# Patient Record
Sex: FEMALE | Race: WHITE | NOT HISPANIC OR LATINO | Employment: OTHER | URBAN - METROPOLITAN AREA
[De-identification: names, ages, dates, MRNs, and addresses within clinical notes are randomized per-mention and may not be internally consistent; named-entity substitution may affect disease eponyms.]

---

## 2023-03-08 DIAGNOSIS — E78.2 MIXED HYPERLIPIDEMIA: Primary | ICD-10-CM

## 2023-03-16 RX ORDER — SIMVASTATIN 20 MG/1
TABLET, FILM COATED ORAL
Qty: 90 TABLET | Refills: 0 | Status: SHIPPED | OUTPATIENT
Start: 2023-03-16 | End: 2023-05-16 | Stop reason: SDUPTHER

## 2023-05-04 ENCOUNTER — TELEPHONE (OUTPATIENT)
Dept: PRIMARY CARE | Facility: CLINIC | Age: 73
End: 2023-05-04
Payer: MEDICARE

## 2023-05-04 NOTE — TELEPHONE ENCOUNTER
Says her MCV # is off and asking if it is ok for her to take Ibuprofen  Also wants you to know she takes hydroxyrea from oncologist and is going to continue that

## 2023-05-15 ENCOUNTER — HOSPITAL ENCOUNTER (OUTPATIENT)
Dept: DATA CONVERSION | Facility: HOSPITAL | Age: 73
End: 2023-05-15
Attending: INTERNAL MEDICINE | Admitting: INTERNAL MEDICINE
Payer: MEDICARE

## 2023-05-15 DIAGNOSIS — D45 POLYCYTHEMIA VERA (MULTI): ICD-10-CM

## 2023-05-15 DIAGNOSIS — Z00.00 ENCOUNTER FOR GENERAL ADULT MEDICAL EXAMINATION WITHOUT ABNORMAL FINDINGS: ICD-10-CM

## 2023-05-15 DIAGNOSIS — E78.5 HYPERLIPIDEMIA, UNSPECIFIED: ICD-10-CM

## 2023-05-15 DIAGNOSIS — E03.9 HYPOTHYROIDISM, UNSPECIFIED: ICD-10-CM

## 2023-05-15 DIAGNOSIS — D12.3 BENIGN NEOPLASM OF TRANSVERSE COLON: ICD-10-CM

## 2023-05-15 DIAGNOSIS — I10 ESSENTIAL (PRIMARY) HYPERTENSION: ICD-10-CM

## 2023-05-15 DIAGNOSIS — Z79.82 LONG TERM (CURRENT) USE OF ASPIRIN: ICD-10-CM

## 2023-05-15 DIAGNOSIS — K63.5 POLYP OF COLON: ICD-10-CM

## 2023-05-15 DIAGNOSIS — K57.30 DIVERTICULOSIS OF LARGE INTESTINE WITHOUT PERFORATION OR ABSCESS WITHOUT BLEEDING: ICD-10-CM

## 2023-05-15 DIAGNOSIS — D12.0 BENIGN NEOPLASM OF CECUM: ICD-10-CM

## 2023-05-15 DIAGNOSIS — Z12.11 ENCOUNTER FOR SCREENING FOR MALIGNANT NEOPLASM OF COLON: ICD-10-CM

## 2023-05-15 DIAGNOSIS — Z88.2 ALLERGY STATUS TO SULFONAMIDES: ICD-10-CM

## 2023-05-15 DIAGNOSIS — D12.8 BENIGN NEOPLASM OF RECTUM: ICD-10-CM

## 2023-05-15 DIAGNOSIS — Z86.010 PERSONAL HISTORY OF COLONIC POLYPS: ICD-10-CM

## 2023-05-15 DIAGNOSIS — K64.1 SECOND DEGREE HEMORRHOIDS: ICD-10-CM

## 2023-05-15 DIAGNOSIS — D12.2 BENIGN NEOPLASM OF ASCENDING COLON: ICD-10-CM

## 2023-05-16 ENCOUNTER — OFFICE VISIT (OUTPATIENT)
Dept: PRIMARY CARE | Facility: CLINIC | Age: 73
End: 2023-05-16
Payer: MEDICARE

## 2023-05-16 VITALS
DIASTOLIC BLOOD PRESSURE: 78 MMHG | WEIGHT: 256 LBS | HEIGHT: 69 IN | BODY MASS INDEX: 37.92 KG/M2 | SYSTOLIC BLOOD PRESSURE: 142 MMHG

## 2023-05-16 DIAGNOSIS — I10 PRIMARY HYPERTENSION: ICD-10-CM

## 2023-05-16 DIAGNOSIS — E78.2 MIXED HYPERLIPIDEMIA: ICD-10-CM

## 2023-05-16 DIAGNOSIS — N32.81 OAB (OVERACTIVE BLADDER): ICD-10-CM

## 2023-05-16 DIAGNOSIS — E03.9 HYPOTHYROIDISM, UNSPECIFIED TYPE: ICD-10-CM

## 2023-05-16 DIAGNOSIS — D45 POLYCYTHEMIA VERA (MULTI): ICD-10-CM

## 2023-05-16 DIAGNOSIS — L98.9 SKIN LESION: ICD-10-CM

## 2023-05-16 DIAGNOSIS — E66.01 CLASS 2 SEVERE OBESITY WITH BODY MASS INDEX (BMI) OF 35 TO 39.9 WITH SERIOUS COMORBIDITY (MULTI): Primary | ICD-10-CM

## 2023-05-16 PROBLEM — E66.812 CLASS 2 SEVERE OBESITY WITH BODY MASS INDEX (BMI) OF 35 TO 39.9 WITH SERIOUS COMORBIDITY: Status: ACTIVE | Noted: 2023-05-16

## 2023-05-16 PROCEDURE — 3077F SYST BP >= 140 MM HG: CPT | Performed by: STUDENT IN AN ORGANIZED HEALTH CARE EDUCATION/TRAINING PROGRAM

## 2023-05-16 PROCEDURE — 99213 OFFICE O/P EST LOW 20 MIN: CPT | Performed by: STUDENT IN AN ORGANIZED HEALTH CARE EDUCATION/TRAINING PROGRAM

## 2023-05-16 PROCEDURE — 1036F TOBACCO NON-USER: CPT | Performed by: STUDENT IN AN ORGANIZED HEALTH CARE EDUCATION/TRAINING PROGRAM

## 2023-05-16 PROCEDURE — 3078F DIAST BP <80 MM HG: CPT | Performed by: STUDENT IN AN ORGANIZED HEALTH CARE EDUCATION/TRAINING PROGRAM

## 2023-05-16 RX ORDER — AMLODIPINE BESYLATE 5 MG/1
5 TABLET ORAL DAILY
Qty: 90 TABLET | Refills: 1 | Status: SHIPPED | OUTPATIENT
Start: 2023-05-16 | End: 2023-09-12 | Stop reason: ALTCHOICE

## 2023-05-16 RX ORDER — ESTRADIOL 0.1 MG/G
CREAM VAGINAL DAILY
COMMUNITY
Start: 2022-03-08

## 2023-05-16 RX ORDER — METOPROLOL TARTRATE 25 MG/1
25 TABLET, FILM COATED ORAL 2 TIMES DAILY
COMMUNITY
Start: 2019-10-10 | End: 2023-05-16 | Stop reason: ALTCHOICE

## 2023-05-16 RX ORDER — OXYBUTYNIN CHLORIDE 10 MG/1
10 TABLET, EXTENDED RELEASE ORAL DAILY
COMMUNITY
Start: 2022-03-08 | End: 2023-05-16 | Stop reason: SDUPTHER

## 2023-05-16 RX ORDER — METOPROLOL SUCCINATE 50 MG/1
50 TABLET, EXTENDED RELEASE ORAL DAILY
Qty: 90 TABLET | Refills: 3 | Status: SHIPPED | OUTPATIENT
Start: 2023-05-16 | End: 2024-04-23 | Stop reason: SDUPTHER

## 2023-05-16 RX ORDER — HYDROXYUREA 500 MG/1
500 CAPSULE ORAL DAILY
COMMUNITY
Start: 2020-06-19 | End: 2023-11-06 | Stop reason: SDUPTHER

## 2023-05-16 RX ORDER — ASPIRIN 81 MG/1
81 TABLET ORAL DAILY
COMMUNITY
Start: 2019-11-12 | End: 2023-05-16 | Stop reason: ALTCHOICE

## 2023-05-16 RX ORDER — LEVOTHYROXINE SODIUM 175 UG/1
175 TABLET ORAL DAILY
COMMUNITY
Start: 2014-02-07 | End: 2023-05-16 | Stop reason: SDUPTHER

## 2023-05-16 RX ORDER — OXYBUTYNIN CHLORIDE 10 MG/1
10 TABLET, EXTENDED RELEASE ORAL DAILY
Qty: 90 TABLET | Refills: 1 | Status: SHIPPED | OUTPATIENT
Start: 2023-05-16 | End: 2023-09-12 | Stop reason: SDUPTHER

## 2023-05-16 RX ORDER — AMLODIPINE BESYLATE 5 MG/1
5 TABLET ORAL DAILY
COMMUNITY
Start: 2017-01-24 | End: 2023-05-16 | Stop reason: SDUPTHER

## 2023-05-16 RX ORDER — SIMVASTATIN 20 MG/1
20 TABLET, FILM COATED ORAL DAILY
Qty: 90 TABLET | Refills: 1 | Status: SHIPPED | OUTPATIENT
Start: 2023-05-16 | End: 2023-09-12 | Stop reason: SDUPTHER

## 2023-05-16 RX ORDER — METOPROLOL TARTRATE 25 MG/1
25 TABLET, FILM COATED ORAL 2 TIMES DAILY
Qty: 180 TABLET | Refills: 1 | Status: CANCELLED | OUTPATIENT
Start: 2023-05-16

## 2023-05-16 RX ORDER — LEVOTHYROXINE SODIUM 175 UG/1
175 TABLET ORAL DAILY
Qty: 90 TABLET | Refills: 1 | Status: SHIPPED | OUTPATIENT
Start: 2023-05-16 | End: 2023-09-12 | Stop reason: SDUPTHER

## 2023-05-16 NOTE — PROGRESS NOTES
"Subjective   Patient ID: Ileana Glover is a 73 y.o. female who presents for Follow-up (Med refill).    HPI   Routine fu.  Med refill.  She has not been monitoring BP at home.  She is hoping to have hip replacement later this year.  Review of Systems  12-point ROS reviewed and was negative unless otherwise noted in HPI.    Objective   /78   Ht 1.753 m (5' 9\")   Wt 116 kg (256 lb)   BMI 37.80 kg/m²     Physical Exam  GEN: conversant, NAD  HEENT: PERRL, EOMI, wearing a mask  NECK: supple, no carotid bruits appreciated b/l  CV: S1, S2, RRR  PULM: CTAB  ABD: soft, NT, ND  NEURO: no new gross focal deficits  EXT: no sig LE edema  PSYCH: appropriate affect    Assessment/Plan     #Hypothyroidism: Maintained on levothyroxine to 175mcg daily    #Polycythemia vera/thrombocytosis: Seeing hematology. Continue hydroxyurea, ASA 81 mg daily.    #Hypertension: above goal. Increase metoprolol succ to 50mg daily Continue amlodipine.    #Hyperlipidemia: Continue statin.     #Obesity, severe, class 2: Continue lifestyle modifications, swimming, weight watchers.    #Knee/shoulder OA: following with Juan Nelson and Ashleigh. Had shoulder surgery with Dr. Sotelo in 7/2022.    #Skin lesions: dermatology referral    #Health maintenance:   Follows with Opthalmology, Dentist regularly.  Follows with gynecology, who orders mammograms regularly.   DEXA performed 2022  Tetanus vaccine in 2013.   Pneumovax 2015. Prevnar 20 recommended  Colonoscopy performed 5/2023, 3 year fu advised. Received Shingrix and COVID-19 vaccine. UTD on flu shot    RTC 4 mo, labs at that time     "

## 2023-05-22 LAB
COMPLETE PATHOLOGY REPORT: NORMAL
CONVERTED CLINICAL DIAGNOSIS-HISTORY: NORMAL
CONVERTED FINAL DIAGNOSIS: NORMAL
CONVERTED FINAL REPORT PDF LINK TO COPY AND PASTE: NORMAL
CONVERTED GROSS DESCRIPTION: NORMAL

## 2023-05-26 LAB
BASOPHILS (10*3/UL) IN BLOOD BY AUTOMATED COUNT: 0.01 X10E9/L (ref 0–0.1)
BASOPHILS/100 LEUKOCYTES IN BLOOD BY AUTOMATED COUNT: 0.1 % (ref 0–2)
ERYTHROCYTE DISTRIBUTION WIDTH (RATIO) BY AUTOMATED COUNT: 15 % (ref 11.5–14.5)
ERYTHROCYTE MEAN CORPUSCULAR HEMOGLOBIN CONCENTRATION (G/DL) BY AUTOMATED: 34.5 G/DL (ref 32–36)
ERYTHROCYTE MEAN CORPUSCULAR VOLUME (FL) BY AUTOMATED COUNT: 98 FL (ref 80–100)
ERYTHROCYTES (10*6/UL) IN BLOOD BY AUTOMATED COUNT: 4.51 X10E12/L (ref 4–5.2)
HEMATOCRIT (%) IN BLOOD BY AUTOMATED COUNT: 44.3 % (ref 36–46)
HEMOGLOBIN (G/DL) IN BLOOD: 15.3 G/DL (ref 12–16)
IMMATURE GRANULOCYTES/100 LEUKOCYTES IN BLOOD BY AUTOMATED COUNT: 0.3 % (ref 0–0.9)
LEUKOCYTES (10*3/UL) IN BLOOD BY AUTOMATED COUNT: 15.9 X10E9/L (ref 4.4–11.3)
LYMPHOCYTES (10*3/UL) IN BLOOD BY AUTOMATED COUNT: 1.24 X10E9/L (ref 0.8–3)
LYMPHOCYTES/100 LEUKOCYTES IN BLOOD BY AUTOMATED COUNT: 7.8 % (ref 13–44)
MONOCYTES (10*3/UL) IN BLOOD BY AUTOMATED COUNT: 0.61 X10E9/L (ref 0.05–0.8)
MONOCYTES/100 LEUKOCYTES IN BLOOD BY AUTOMATED COUNT: 3.8 % (ref 2–10)
NEUTROPHILS (10*3/UL) IN BLOOD BY AUTOMATED COUNT: 13.97 X10E9/L (ref 1.6–5.5)
NEUTROPHILS/100 LEUKOCYTES IN BLOOD BY AUTOMATED COUNT: 88 % (ref 40–80)
PLATELETS (10*3/UL) IN BLOOD AUTOMATED COUNT: 333 X10E9/L (ref 150–450)

## 2023-05-30 ENCOUNTER — TELEPHONE (OUTPATIENT)
Dept: PRIMARY CARE | Facility: CLINIC | Age: 73
End: 2023-05-30

## 2023-08-21 LAB
ALANINE AMINOTRANSFERASE (SGPT) (U/L) IN SER/PLAS: 14 U/L (ref 7–45)
ALBUMIN (G/DL) IN SER/PLAS: 3.5 G/DL (ref 3.4–5)
ALKALINE PHOSPHATASE (U/L) IN SER/PLAS: 74 U/L (ref 33–136)
ANION GAP IN SER/PLAS: 13 MMOL/L (ref 10–20)
ASPARTATE AMINOTRANSFERASE (SGOT) (U/L) IN SER/PLAS: 16 U/L (ref 9–39)
BASOPHILS (10*3/UL) IN BLOOD BY AUTOMATED COUNT: 0.04 X10E9/L (ref 0–0.1)
BASOPHILS/100 LEUKOCYTES IN BLOOD BY AUTOMATED COUNT: 0.7 % (ref 0–2)
BILIRUBIN TOTAL (MG/DL) IN SER/PLAS: 0.6 MG/DL (ref 0–1.2)
CALCIUM (MG/DL) IN SER/PLAS: 10 MG/DL (ref 8.6–10.6)
CARBON DIOXIDE, TOTAL (MMOL/L) IN SER/PLAS: 29 MMOL/L (ref 21–32)
CHLORIDE (MMOL/L) IN SER/PLAS: 104 MMOL/L (ref 98–107)
CREATININE (MG/DL) IN SER/PLAS: 0.87 MG/DL (ref 0.5–1.05)
EOSINOPHILS (10*3/UL) IN BLOOD BY AUTOMATED COUNT: 0.09 X10E9/L (ref 0–0.4)
EOSINOPHILS/100 LEUKOCYTES IN BLOOD BY AUTOMATED COUNT: 1.5 % (ref 0–6)
ERYTHROCYTE DISTRIBUTION WIDTH (RATIO) BY AUTOMATED COUNT: 13.7 % (ref 11.5–14.5)
ERYTHROCYTE MEAN CORPUSCULAR HEMOGLOBIN CONCENTRATION (G/DL) BY AUTOMATED: 33.3 G/DL (ref 32–36)
ERYTHROCYTE MEAN CORPUSCULAR VOLUME (FL) BY AUTOMATED COUNT: 103 FL (ref 80–100)
ERYTHROCYTES (10*6/UL) IN BLOOD BY AUTOMATED COUNT: 4.4 X10E12/L (ref 4–5.2)
GFR FEMALE: 70 ML/MIN/1.73M2
GLUCOSE (MG/DL) IN SER/PLAS: 73 MG/DL (ref 74–99)
HEMATOCRIT (%) IN BLOOD BY AUTOMATED COUNT: 45.3 % (ref 36–46)
HEMOGLOBIN (G/DL) IN BLOOD: 15.1 G/DL (ref 12–16)
IMMATURE GRANULOCYTES/100 LEUKOCYTES IN BLOOD BY AUTOMATED COUNT: 0.5 % (ref 0–0.9)
LEUKOCYTES (10*3/UL) IN BLOOD BY AUTOMATED COUNT: 5.9 X10E9/L (ref 4.4–11.3)
LYMPHOCYTES (10*3/UL) IN BLOOD BY AUTOMATED COUNT: 1.85 X10E9/L (ref 0.8–3)
LYMPHOCYTES/100 LEUKOCYTES IN BLOOD BY AUTOMATED COUNT: 31.2 % (ref 13–44)
MONOCYTES (10*3/UL) IN BLOOD BY AUTOMATED COUNT: 0.39 X10E9/L (ref 0.05–0.8)
MONOCYTES/100 LEUKOCYTES IN BLOOD BY AUTOMATED COUNT: 6.6 % (ref 2–10)
NEUTROPHILS (10*3/UL) IN BLOOD BY AUTOMATED COUNT: 3.53 X10E9/L (ref 1.6–5.5)
NEUTROPHILS/100 LEUKOCYTES IN BLOOD BY AUTOMATED COUNT: 59.5 % (ref 40–80)
NRBC (PER 100 WBCS) BY AUTOMATED COUNT: 0 /100 WBC (ref 0–0)
PLATELETS (10*3/UL) IN BLOOD AUTOMATED COUNT: 307 X10E9/L (ref 150–450)
POTASSIUM (MMOL/L) IN SER/PLAS: 4.8 MMOL/L (ref 3.5–5.3)
PROTEIN TOTAL: 6.3 G/DL (ref 6.4–8.2)
SODIUM (MMOL/L) IN SER/PLAS: 141 MMOL/L (ref 136–145)
UREA NITROGEN (MG/DL) IN SER/PLAS: 19 MG/DL (ref 6–23)

## 2023-09-12 ENCOUNTER — OFFICE VISIT (OUTPATIENT)
Dept: PRIMARY CARE | Facility: CLINIC | Age: 73
End: 2023-09-12
Payer: MEDICARE

## 2023-09-12 ENCOUNTER — LAB (OUTPATIENT)
Dept: LAB | Facility: LAB | Age: 73
End: 2023-09-12
Payer: MEDICARE

## 2023-09-12 VITALS
BODY MASS INDEX: 37.33 KG/M2 | DIASTOLIC BLOOD PRESSURE: 82 MMHG | SYSTOLIC BLOOD PRESSURE: 150 MMHG | WEIGHT: 252 LBS | HEIGHT: 69 IN

## 2023-09-12 DIAGNOSIS — D45 POLYCYTHEMIA VERA (MULTI): ICD-10-CM

## 2023-09-12 DIAGNOSIS — N32.81 OAB (OVERACTIVE BLADDER): ICD-10-CM

## 2023-09-12 DIAGNOSIS — E78.2 MIXED HYPERLIPIDEMIA: ICD-10-CM

## 2023-09-12 DIAGNOSIS — M16.9 OSTEOARTHRITIS OF HIP, UNSPECIFIED LATERALITY, UNSPECIFIED OSTEOARTHRITIS TYPE: ICD-10-CM

## 2023-09-12 DIAGNOSIS — Z00.00 HEALTH CARE MAINTENANCE: ICD-10-CM

## 2023-09-12 DIAGNOSIS — E03.9 HYPOTHYROIDISM, UNSPECIFIED TYPE: ICD-10-CM

## 2023-09-12 DIAGNOSIS — Z00.00 HEALTHCARE MAINTENANCE: ICD-10-CM

## 2023-09-12 DIAGNOSIS — Z00.00 HEALTHCARE MAINTENANCE: Primary | ICD-10-CM

## 2023-09-12 DIAGNOSIS — I10 PRIMARY HYPERTENSION: ICD-10-CM

## 2023-09-12 LAB
ALANINE AMINOTRANSFERASE (SGPT) (U/L) IN SER/PLAS: 15 U/L (ref 7–45)
ALBUMIN (G/DL) IN SER/PLAS: 4.3 G/DL (ref 3.4–5)
ALKALINE PHOSPHATASE (U/L) IN SER/PLAS: 73 U/L (ref 33–136)
ANION GAP IN SER/PLAS: 12 MMOL/L (ref 10–20)
ASPARTATE AMINOTRANSFERASE (SGOT) (U/L) IN SER/PLAS: 19 U/L (ref 9–39)
BILIRUBIN TOTAL (MG/DL) IN SER/PLAS: 0.7 MG/DL (ref 0–1.2)
CALCIUM (MG/DL) IN SER/PLAS: 10.2 MG/DL (ref 8.6–10.6)
CARBON DIOXIDE, TOTAL (MMOL/L) IN SER/PLAS: 28 MMOL/L (ref 21–32)
CHLORIDE (MMOL/L) IN SER/PLAS: 104 MMOL/L (ref 98–107)
CHOLESTEROL (MG/DL) IN SER/PLAS: 183 MG/DL (ref 0–199)
CHOLESTEROL IN HDL (MG/DL) IN SER/PLAS: 61.6 MG/DL
CHOLESTEROL/HDL RATIO: 3
CREATININE (MG/DL) IN SER/PLAS: 0.83 MG/DL (ref 0.5–1.05)
ERYTHROCYTE DISTRIBUTION WIDTH (RATIO) BY AUTOMATED COUNT: 13.9 % (ref 11.5–14.5)
ERYTHROCYTE MEAN CORPUSCULAR HEMOGLOBIN CONCENTRATION (G/DL) BY AUTOMATED: 32.9 G/DL (ref 32–36)
ERYTHROCYTE MEAN CORPUSCULAR VOLUME (FL) BY AUTOMATED COUNT: 104 FL (ref 80–100)
ERYTHROCYTES (10*6/UL) IN BLOOD BY AUTOMATED COUNT: 4.49 X10E12/L (ref 4–5.2)
GFR FEMALE: 74 ML/MIN/1.73M2
GLUCOSE (MG/DL) IN SER/PLAS: 84 MG/DL (ref 74–99)
HEMATOCRIT (%) IN BLOOD BY AUTOMATED COUNT: 46.5 % (ref 36–46)
HEMOGLOBIN (G/DL) IN BLOOD: 15.3 G/DL (ref 12–16)
LDL: 103 MG/DL (ref 0–99)
LEUKOCYTES (10*3/UL) IN BLOOD BY AUTOMATED COUNT: 6.6 X10E9/L (ref 4.4–11.3)
NRBC (PER 100 WBCS) BY AUTOMATED COUNT: 0 /100 WBC (ref 0–0)
PLATELETS (10*3/UL) IN BLOOD AUTOMATED COUNT: 292 X10E9/L (ref 150–450)
POTASSIUM (MMOL/L) IN SER/PLAS: 5 MMOL/L (ref 3.5–5.3)
PROTEIN TOTAL: 6.8 G/DL (ref 6.4–8.2)
SODIUM (MMOL/L) IN SER/PLAS: 139 MMOL/L (ref 136–145)
THYROTROPIN (MIU/L) IN SER/PLAS BY DETECTION LIMIT <= 0.05 MIU/L: 1.45 MIU/L (ref 0.44–3.98)
TRIGLYCERIDE (MG/DL) IN SER/PLAS: 93 MG/DL (ref 0–149)
UREA NITROGEN (MG/DL) IN SER/PLAS: 15 MG/DL (ref 6–23)
VLDL: 19 MG/DL (ref 0–40)

## 2023-09-12 PROCEDURE — 80053 COMPREHEN METABOLIC PANEL: CPT

## 2023-09-12 PROCEDURE — 1125F AMNT PAIN NOTED PAIN PRSNT: CPT | Performed by: STUDENT IN AN ORGANIZED HEALTH CARE EDUCATION/TRAINING PROGRAM

## 2023-09-12 PROCEDURE — 80061 LIPID PANEL: CPT

## 2023-09-12 PROCEDURE — 84443 ASSAY THYROID STIM HORMONE: CPT

## 2023-09-12 PROCEDURE — 1036F TOBACCO NON-USER: CPT | Performed by: STUDENT IN AN ORGANIZED HEALTH CARE EDUCATION/TRAINING PROGRAM

## 2023-09-12 PROCEDURE — 3079F DIAST BP 80-89 MM HG: CPT | Performed by: STUDENT IN AN ORGANIZED HEALTH CARE EDUCATION/TRAINING PROGRAM

## 2023-09-12 PROCEDURE — 85027 COMPLETE CBC AUTOMATED: CPT

## 2023-09-12 PROCEDURE — 99214 OFFICE O/P EST MOD 30 MIN: CPT | Performed by: STUDENT IN AN ORGANIZED HEALTH CARE EDUCATION/TRAINING PROGRAM

## 2023-09-12 PROCEDURE — 3077F SYST BP >= 140 MM HG: CPT | Performed by: STUDENT IN AN ORGANIZED HEALTH CARE EDUCATION/TRAINING PROGRAM

## 2023-09-12 PROCEDURE — 36415 COLL VENOUS BLD VENIPUNCTURE: CPT

## 2023-09-12 RX ORDER — OXYBUTYNIN CHLORIDE 10 MG/1
10 TABLET, EXTENDED RELEASE ORAL DAILY
Qty: 90 TABLET | Refills: 1 | Status: SHIPPED | OUTPATIENT
Start: 2023-09-12 | End: 2024-04-23 | Stop reason: SDUPTHER

## 2023-09-12 RX ORDER — LEVOTHYROXINE SODIUM 175 UG/1
175 TABLET ORAL DAILY
Qty: 90 TABLET | Refills: 1 | Status: SHIPPED | OUTPATIENT
Start: 2023-09-12 | End: 2024-04-23 | Stop reason: SDUPTHER

## 2023-09-12 RX ORDER — OLMESARTAN MEDOXOMIL 5 MG/1
5 TABLET ORAL DAILY
Qty: 90 TABLET | Refills: 1 | Status: SHIPPED | OUTPATIENT
Start: 2023-09-12 | End: 2024-03-25 | Stop reason: SDUPTHER

## 2023-09-12 RX ORDER — SIMVASTATIN 20 MG/1
20 TABLET, FILM COATED ORAL DAILY
Qty: 90 TABLET | Refills: 1 | Status: SHIPPED | OUTPATIENT
Start: 2023-09-12 | End: 2024-04-23 | Stop reason: SDUPTHER

## 2023-09-12 NOTE — PROGRESS NOTES
"Subjective   Patient ID: Ileana Glover is a 73 y.o. female who presents for Follow-up.    HPI   Routine fu.    She has upcoming hip replacement planned with Dr. Nelson.    She is then hoping to have TKA done a few months from now.  Review of Systems  12-point ROS reviewed and was negative unless otherwise noted in HPI.    Objective   /82   Ht 1.753 m (5' 9\")   Wt 114 kg (252 lb)   BMI 37.21 kg/m²     Physical Exam  GEN: conversant, NAD  HEENT: PERRL, EOMI, MMM  NECK: supple, no carotid bruits appreciated b/l  CV: S1, S2, RRR  PULM: CTAB  ABD: soft, NT, ND  NEURO: no new gross focal deficits  EXT: no sig LE edema  PSYCH: appropriate affect    Assessment/Plan     #Hypertension: above goal. Start olmesartan 5mg daily, discussed SE profile. Monitor metabolic panel.    #Hypothyroidism: Maintained on levothyroxine to 175mcg daily, check TSH     #Polycythemia vera/thrombocytosis: Seeing hematology. Continue hydroxyurea, ASA 81 mg daily.     #Hyperlipidemia: Continue statin.      #Obesity, severe, class 2: Continue lifestyle modifications, swimming, weight watchers.     #Hip/Knee/shoulder OA: following with Juan Nelson and Ashleigh. Had shoulder surgery with Dr. Sotelo in 7/2022. Has upcoming hip surgery scheduled.     #Skin lesions: seeing dermatology      #Health maintenance:   Follows with Opthalmology, Dentist regularly.  Follows with gynecology, who orders mammograms regularly.   DEXA performed 2022  Tetanus vaccine in 2013.   Pneumovax 2015. Prevnar 20 recommended  Colonoscopy performed 5/2023, 3 year fu advised. Received Shingrix and COVID-19 vaccine. Advised yearly flu shot.     RTC 3-4 mo     "

## 2023-09-27 ENCOUNTER — HOSPITAL ENCOUNTER (OUTPATIENT)
Dept: DATA CONVERSION | Facility: HOSPITAL | Age: 73
End: 2023-09-27
Attending: ORTHOPAEDIC SURGERY | Admitting: ORTHOPAEDIC SURGERY
Payer: MEDICARE

## 2023-09-27 DIAGNOSIS — M16.11 UNILATERAL PRIMARY OSTEOARTHRITIS, RIGHT HIP: ICD-10-CM

## 2023-09-29 VITALS
SYSTOLIC BLOOD PRESSURE: 141 MMHG | HEIGHT: 70 IN | WEIGHT: 255.95 LBS | DIASTOLIC BLOOD PRESSURE: 92 MMHG | BODY MASS INDEX: 36.64 KG/M2 | OXYGEN SATURATION: 99 %

## 2023-09-30 NOTE — H&P
History & Physical Reviewed:   I have reviewed the History and Physical dated:  22-Sep-2023   History and Physical reviewed and relevant findings noted. Patient examined to review pertinent physical  findings.: No significant changes   Home Medications Reviewed: no changes noted   Allergies Reviewed: no changes noted       ERAS (Enhanced Recovery After Surgery):  ·  ERAS Patient: no     Consent:   COVID-19 Consent:  ·  COVID-19 Risk Consent Surgeon has reviewed key risks related to the risk of penny COVID-19 and if they contract COVID-19 what the risks are.       Electronic Signatures:  Maurizio Nelson)  (Signed 27-Sep-2023 07:31)   Authored: History & Physical Reviewed, ERAS, Consent,  Note Completion      Last Updated: 27-Sep-2023 07:31 by Maurizio Nelson)

## 2023-10-01 NOTE — OP NOTE
Post Operative Note:     PreOp Diagnosis: arthritis right hip   Post-Procedure Diagnosis: same   Procedure: 1. right total hip arthroplasty  2.   3.   4.   5.   Surgeon: Omar   Resident/Fellow/Other Assistant:    Estimated Blood Loss (mL): none   Specimen: no   Findings: djd     Operative Report Dictated:  Dictation: not applicable - note contains Operative  Report   Operative Report:    Diagnosis: Right hip arthritis  Procedure: Right total hip arthroplasty  Surgeon: Omar  Anesthetic: Spinal  Complications: None  Blood loss: Approximately 150 cc  Equipment used: DePuy Wesson cup and Actis stem    Operative procedure: Preoperative huddle was performed the patient identification procedure and site verification.  Patient given prophylactic antibiotics and tranexamic acid.  After placement of spinal anesthetic augmented by IV sedation the patient  was positioned in the lateral decubitus position.  The hip and leg were sterilely prepped and draped in usual fashion, ChloraPrep solution was utilized as well as a Betadine impregnated drape.  Standard posterior approach was made to the hip short rotators  and capsule taken down 1 layer and teed proximally hip was dislocated.  Femoral neck cut was made using neck cutting guide deep acetabular tractors were placed labrum was excised.  The S-10 was then prepared in the standard fashion was ultimately reamed  to 53 mm and a 54 mm DePuy Wesson sector GRIPTION cup was placed and had good interference fit.  2 screws used were used for additional fixation 10 degree extended liner was then placed in the 10 o'clock position.  Next attention was paid to the femur  was prepared in the standard fashion ultimately a size 9 Actis stem was placed had good torsional stability after trials standard offset was selected with a +1-1/2 36 mm ceramic head was [place] and placed.  The hip was stable leg lengths appeared equal  the wound was then irrigated and closed in layers it  was dry prior to closure the short rotators and capsule reattached to the trochanter through drill holes.  The skin was closed using a running subsequent suture with a Dermabond dressing.  Sterile dressings  applied and patient taken recovery having tolerated procedure well.      Electronic Signatures:  Maurizio Nelson)  (Signed 27-Sep-2023 09:17)   Authored: Post Operative Note, Note Completion      Last Updated: 27-Sep-2023 09:17 by Maurizio Nelson)

## 2023-10-08 PROBLEM — S86.319A PERONEAL TENDON TEAR: Status: ACTIVE | Noted: 2023-10-08

## 2023-10-08 PROBLEM — D75.839 THROMBOCYTOSIS: Status: ACTIVE | Noted: 2023-10-08

## 2023-10-08 PROBLEM — M67.00 CONTRACTURE OF ACHILLES TENDON: Status: ACTIVE | Noted: 2023-10-08

## 2023-10-08 PROBLEM — L57.9 SKIN CHANGES DUE TO CHRONIC EXPOSURE TO NONIONIZING RADIATION, UNSPECIFIED: Status: ACTIVE | Noted: 2023-08-10

## 2023-10-08 PROBLEM — M19.079 ARTHRITIS, MIDFOOT: Status: ACTIVE | Noted: 2023-10-08

## 2023-10-08 PROBLEM — N81.89 PELVIC FLOOR WEAKNESS: Status: ACTIVE | Noted: 2023-10-08

## 2023-10-08 PROBLEM — L23.7 ALLERGIC CONTACT DERMATITIS DUE TO PLANTS, EXCEPT FOOD: Status: ACTIVE | Noted: 2023-08-10

## 2023-10-08 PROBLEM — E66.9 OBESITY: Status: ACTIVE | Noted: 2023-05-16

## 2023-10-08 PROBLEM — E87.5 HYPERKALEMIA: Status: ACTIVE | Noted: 2023-10-08

## 2023-10-08 PROBLEM — R21 RASH AND NONSPECIFIC SKIN ERUPTION: Status: ACTIVE | Noted: 2023-08-10

## 2023-10-08 PROBLEM — M19.90 OSTEOARTHRITIS: Status: ACTIVE | Noted: 2023-10-08

## 2023-10-08 PROBLEM — R92.2 INCONCLUSIVE MAMMOGRAM: Status: ACTIVE | Noted: 2023-10-08

## 2023-10-08 PROBLEM — D18.01 HEMANGIOMA OF SKIN AND SUBCUTANEOUS TISSUE: Status: ACTIVE | Noted: 2023-08-10

## 2023-10-08 PROBLEM — K21.9 GERD (GASTROESOPHAGEAL REFLUX DISEASE): Status: ACTIVE | Noted: 2023-10-08

## 2023-10-08 PROBLEM — D22.4 MELANOCYTIC NEVI OF SCALP AND NECK: Status: ACTIVE | Noted: 2023-08-10

## 2023-10-08 PROBLEM — S52.502A DISTAL RADIUS FRACTURE, LEFT: Status: ACTIVE | Noted: 2023-10-08

## 2023-10-08 PROBLEM — M54.12 CERVICAL RADICULOPATHY AT C6: Status: ACTIVE | Noted: 2023-08-10

## 2023-10-08 RX ORDER — MULTIVITAMIN
1 TABLET ORAL
COMMUNITY
Start: 2012-08-08

## 2023-10-08 RX ORDER — POLYETHYLENE GLYCOL 3350, SODIUM CHLORIDE, SODIUM BICARBONATE, POTASSIUM CHLORIDE 420; 11.2; 5.72; 1.48 G/4L; G/4L; G/4L; G/4L
POWDER, FOR SOLUTION ORAL
COMMUNITY
Start: 2023-05-04

## 2023-10-08 RX ORDER — ASPIRIN 81 MG/1
1 TABLET ORAL DAILY
COMMUNITY
Start: 2019-11-12

## 2023-10-08 RX ORDER — IBUPROFEN 800 MG/1
TABLET ORAL
COMMUNITY
Start: 2023-04-14

## 2023-10-08 RX ORDER — AMMONIUM LACTATE 12 G/100G
LOTION TOPICAL
COMMUNITY
Start: 2016-02-18

## 2023-10-08 RX ORDER — AMLODIPINE BESYLATE 5 MG/1
1 TABLET ORAL DAILY
COMMUNITY
Start: 2017-01-24 | End: 2024-04-23 | Stop reason: WASHOUT

## 2023-10-08 RX ORDER — SOD SULF/POT CHLORIDE/MAG SULF 1.479 G
TABLET ORAL
COMMUNITY
Start: 2023-02-21 | End: 2023-12-13 | Stop reason: ALTCHOICE

## 2023-10-08 RX ORDER — HYDROCORTISONE 25 MG/G
CREAM TOPICAL
COMMUNITY
Start: 2021-08-24

## 2023-10-08 RX ORDER — METHYLPREDNISOLONE 4 MG/1
TABLET ORAL
COMMUNITY
Start: 2023-04-14 | End: 2023-12-13 | Stop reason: ALTCHOICE

## 2023-10-08 RX ORDER — BENZONATATE 100 MG/1
100 CAPSULE ORAL 3 TIMES DAILY PRN
COMMUNITY
Start: 2022-10-26 | End: 2023-12-13 | Stop reason: ALTCHOICE

## 2023-10-08 RX ORDER — IBUPROFEN 200 MG
CAPSULE ORAL
COMMUNITY
Start: 2009-05-28

## 2023-10-08 RX ORDER — CHLORHEXIDINE GLUCONATE ORAL RINSE 1.2 MG/ML
SOLUTION DENTAL
COMMUNITY
Start: 2023-09-22

## 2023-10-08 RX ORDER — TRIAMCINOLONE ACETONIDE 1 MG/G
CREAM TOPICAL
COMMUNITY
Start: 2023-07-05

## 2023-10-08 RX ORDER — SODIUM FLUORIDE 6 MG/ML
PASTE, DENTIFRICE DENTAL
COMMUNITY
Start: 2023-07-13

## 2023-10-08 RX ORDER — LISINOPRIL 10 MG/1
10 TABLET ORAL
COMMUNITY
Start: 2015-03-06 | End: 2023-12-13 | Stop reason: ALTCHOICE

## 2023-10-08 RX ORDER — FLUOCINONIDE 0.5 MG/G
CREAM TOPICAL
COMMUNITY
Start: 2023-07-05

## 2023-10-08 RX ORDER — DEXTROMETHORPHAN HYDROBROMIDE, GUAIFENESIN 5; 100 MG/5ML; MG/5ML
1300 LIQUID ORAL EVERY 8 HOURS PRN
COMMUNITY
Start: 2015-09-08

## 2023-10-08 RX ORDER — OMEPRAZOLE 20 MG/1
20 CAPSULE, DELAYED RELEASE ORAL
COMMUNITY
Start: 2015-09-08 | End: 2023-12-13 | Stop reason: ALTCHOICE

## 2023-10-08 RX ORDER — HYALURONATE SODIUM 30 MG/2 ML
SYRINGE (ML) INTRAARTICULAR
COMMUNITY
Start: 2023-05-05

## 2023-10-10 ENCOUNTER — ANCILLARY PROCEDURE (OUTPATIENT)
Dept: RADIOLOGY | Facility: CLINIC | Age: 73
End: 2023-10-10
Payer: MEDICARE

## 2023-10-10 ENCOUNTER — OFFICE VISIT (OUTPATIENT)
Dept: ORTHOPEDIC SURGERY | Facility: CLINIC | Age: 73
End: 2023-10-10
Payer: MEDICARE

## 2023-10-10 VITALS — WEIGHT: 250 LBS | HEIGHT: 70 IN | BODY MASS INDEX: 35.79 KG/M2

## 2023-10-10 DIAGNOSIS — Z96.641 STATUS POST HIP REPLACEMENT, RIGHT: Primary | ICD-10-CM

## 2023-10-10 DIAGNOSIS — Z96.641 STATUS POST HIP REPLACEMENT, RIGHT: ICD-10-CM

## 2023-10-10 PROCEDURE — 99024 POSTOP FOLLOW-UP VISIT: CPT | Performed by: ORTHOPAEDIC SURGERY

## 2023-10-10 PROCEDURE — 73502 X-RAY EXAM HIP UNI 2-3 VIEWS: CPT | Mod: RIGHT SIDE | Performed by: RADIOLOGY

## 2023-10-10 PROCEDURE — 73502 X-RAY EXAM HIP UNI 2-3 VIEWS: CPT | Mod: RT,FY

## 2023-10-10 PROCEDURE — 1036F TOBACCO NON-USER: CPT | Performed by: ORTHOPAEDIC SURGERY

## 2023-10-10 PROCEDURE — 1125F AMNT PAIN NOTED PAIN PRSNT: CPT | Performed by: ORTHOPAEDIC SURGERY

## 2023-10-10 NOTE — PROGRESS NOTES
Couple weeks postop right hip replacement doing fairly well incision looks fine gait is mildly antalgic no leg length discrepancy  X-rays show satisfactory position of prosthesis assessment satisfactory postoperative visit  Prescription for outpatient physical therapy reviewed expectations dislocation precautions follow-up 2 months or as needed  All questions answered

## 2023-10-30 ENCOUNTER — LAB (OUTPATIENT)
Dept: LAB | Facility: LAB | Age: 73
End: 2023-10-30
Payer: MEDICARE

## 2023-10-30 DIAGNOSIS — D45 POLYCYTHEMIA VERA (MULTI): Primary | ICD-10-CM

## 2023-10-30 LAB
ALBUMIN SERPL BCP-MCNC: 4.2 G/DL (ref 3.4–5)
ALP SERPL-CCNC: 96 U/L (ref 33–136)
ALT SERPL W P-5'-P-CCNC: 12 U/L (ref 7–45)
ANION GAP SERPL CALC-SCNC: 16 MMOL/L (ref 10–20)
AST SERPL W P-5'-P-CCNC: 16 U/L (ref 9–39)
BASOPHILS # BLD AUTO: 0.03 X10*3/UL (ref 0–0.1)
BASOPHILS NFR BLD AUTO: 0.5 %
BILIRUB SERPL-MCNC: 0.5 MG/DL (ref 0–1.2)
BUN SERPL-MCNC: 15 MG/DL (ref 6–23)
CALCIUM SERPL-MCNC: 9.9 MG/DL (ref 8.6–10.6)
CHLORIDE SERPL-SCNC: 105 MMOL/L (ref 98–107)
CO2 SERPL-SCNC: 25 MMOL/L (ref 21–32)
CREAT SERPL-MCNC: 0.82 MG/DL (ref 0.5–1.05)
EOSINOPHIL # BLD AUTO: 0.11 X10*3/UL (ref 0–0.4)
EOSINOPHIL NFR BLD AUTO: 2 %
ERYTHROCYTE [DISTWIDTH] IN BLOOD BY AUTOMATED COUNT: 14.1 % (ref 11.5–14.5)
GFR SERPL CREATININE-BSD FRML MDRD: 75 ML/MIN/1.73M*2
GLUCOSE SERPL-MCNC: 89 MG/DL (ref 74–99)
HCT VFR BLD AUTO: 39.8 % (ref 36–46)
HGB BLD-MCNC: 13.1 G/DL (ref 12–16)
IMM GRANULOCYTES # BLD AUTO: 0.01 X10*3/UL (ref 0–0.5)
IMM GRANULOCYTES NFR BLD AUTO: 0.2 % (ref 0–0.9)
LYMPHOCYTES # BLD AUTO: 1.63 X10*3/UL (ref 0.8–3)
LYMPHOCYTES NFR BLD AUTO: 29.9 %
MCH RBC QN AUTO: 34.1 PG (ref 26–34)
MCHC RBC AUTO-ENTMCNC: 32.9 G/DL (ref 32–36)
MCV RBC AUTO: 104 FL (ref 80–100)
MONOCYTES # BLD AUTO: 0.45 X10*3/UL (ref 0.05–0.8)
MONOCYTES NFR BLD AUTO: 8.2 %
NEUTROPHILS # BLD AUTO: 3.23 X10*3/UL (ref 1.6–5.5)
NEUTROPHILS NFR BLD AUTO: 59.2 %
NRBC BLD-RTO: 0 /100 WBCS (ref 0–0)
PLATELET # BLD AUTO: 240 X10*3/UL (ref 150–450)
PMV BLD AUTO: 11.3 FL (ref 7.5–11.5)
POTASSIUM SERPL-SCNC: 4.7 MMOL/L (ref 3.5–5.3)
PROT SERPL-MCNC: 6.5 G/DL (ref 6.4–8.2)
RBC # BLD AUTO: 3.84 X10*6/UL (ref 4–5.2)
SODIUM SERPL-SCNC: 141 MMOL/L (ref 136–145)
WBC # BLD AUTO: 5.5 X10*3/UL (ref 4.4–11.3)

## 2023-10-30 PROCEDURE — 80053 COMPREHEN METABOLIC PANEL: CPT

## 2023-10-30 PROCEDURE — 85025 COMPLETE CBC W/AUTO DIFF WBC: CPT

## 2023-10-30 PROCEDURE — 36415 COLL VENOUS BLD VENIPUNCTURE: CPT

## 2023-11-06 ENCOUNTER — OFFICE VISIT (OUTPATIENT)
Dept: HEMATOLOGY/ONCOLOGY | Facility: CLINIC | Age: 73
End: 2023-11-06
Payer: MEDICARE

## 2023-11-06 ENCOUNTER — APPOINTMENT (OUTPATIENT)
Dept: HEMATOLOGY/ONCOLOGY | Facility: CLINIC | Age: 73
End: 2023-11-06
Payer: MEDICARE

## 2023-11-06 VITALS
TEMPERATURE: 97.9 F | WEIGHT: 256.62 LBS | HEART RATE: 56 BPM | RESPIRATION RATE: 18 BRPM | OXYGEN SATURATION: 96 % | SYSTOLIC BLOOD PRESSURE: 134 MMHG | DIASTOLIC BLOOD PRESSURE: 76 MMHG | BODY MASS INDEX: 36.82 KG/M2

## 2023-11-06 DIAGNOSIS — D45 POLYCYTHEMIA VERA (MULTI): Primary | ICD-10-CM

## 2023-11-06 PROCEDURE — 1036F TOBACCO NON-USER: CPT | Performed by: NURSE PRACTITIONER

## 2023-11-06 PROCEDURE — 99214 OFFICE O/P EST MOD 30 MIN: CPT | Performed by: NURSE PRACTITIONER

## 2023-11-06 PROCEDURE — 1159F MED LIST DOCD IN RCRD: CPT | Performed by: NURSE PRACTITIONER

## 2023-11-06 PROCEDURE — 1125F AMNT PAIN NOTED PAIN PRSNT: CPT | Performed by: NURSE PRACTITIONER

## 2023-11-06 PROCEDURE — 3078F DIAST BP <80 MM HG: CPT | Performed by: NURSE PRACTITIONER

## 2023-11-06 PROCEDURE — 3075F SYST BP GE 130 - 139MM HG: CPT | Performed by: NURSE PRACTITIONER

## 2023-11-06 RX ORDER — HYDROXYUREA 500 MG/1
CAPSULE ORAL
Qty: 180 CAPSULE | Refills: 2 | Status: SHIPPED | OUTPATIENT
Start: 2023-11-06 | End: 2023-11-06 | Stop reason: SDUPTHER

## 2023-11-06 RX ORDER — HYDROXYUREA 500 MG/1
CAPSULE ORAL
Qty: 180 CAPSULE | Refills: 2 | Status: SHIPPED | OUTPATIENT
Start: 2023-11-06

## 2023-11-06 ASSESSMENT — PAIN SCALES - GENERAL: PAINLEVEL: 8

## 2023-11-06 NOTE — PROGRESS NOTES
Chief Complaint: DEMETRIO-2 positive MPD   Interval History:    Location: [facility]  DX: Demetrio 2+ PV  TX: ASA, Hydrea     Today, 5/26/23, patient presents to clinic for transfer of care from Dr. Maddox to MARIELY Boo.      Originally referred by Dr. Bush for erythrocytosis.     This is a 74 y/o woman who is referred to us for erythrocytosis. Had BM biopsy 5/2020 which showed DEMETRIO-2 positive mutation and was diagnosed with Polycythemia Vera. Was already on ASA.  Was started her on Hydrea 500mg by mouth daily in June 2020. In october we bumped this up to 1000mg by mouth daily.     5/26/23, she reports feeling well. No ill SE from Hydrea 1000mg PO daily. Tells me she stopped asa about 1-1.5 yrs ago. Notes she recently had a steroid injection for hip pain     The patient has not noted fever, chills, drenching night sweats, infectious symptoms, lymphadenopathy, weight loss, bone pain, hemorrhage from any site, melena, or respiratory symptoms. No new issues.      8/25/23 - Here for followup. Taking Hydrea 2caps daily and asa daily. No ill SE. Chronic joint issues - will get hip replaced next month. The patient has not noted fever, chills, drenching night sweats, infectious symptoms, lymphadenopathy, weight loss,  bone pain, hemorrhage from any site, melena, or respiratory symptoms. No new issues.     11/6/23 - Here for followup. Taking Hydrea 2caps daily and asa daily. No ill SE. Had rt hip replacement 9/27/23. No other new issues        Review of Systems:   Review of Systems:    10 point review of systems negative except as state in HPI.         Allergies and Intolerances:       Allergies:         sulfa drugs: Drug Category, Hives/Urticaria, Active     Outpatient Medication Profile:  * Patient Currently Takes Medications as of 26-May-2023 13:49 documented in Structured Notes         hydroxyurea 500 mg oral capsule : 2 cap(s) orally once a day , Start Date: 26-May-2023         simvastatin 20 mg oral tablet: 1  tab(s) orally once a day (at bedtime)         amLODIPine 5 mg oral tablet: 1 tab(s) orally once a day         Calcium 500+D: 1  orally 2 times a day         Metoprolol Tartrate 25 mg oral tablet: 1 tab(s) orally 2 times a day         Aspir 81 oral delayed release tablet: 1 tab(s) orally once a day         levothyroxine 175 mcg (0.175 mg) oral tablet: 1 tab(s) orally once a  day         oxybutynin 10 mg/24 hr oral tablet, extended release: 1 tab(s) orally  once a day             Medical History:         Polycythemia vera: ICD-10: D45, Status: Active         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: 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, Status: Active         Erythrocytosis: ICD-10: D75.1, Status: Active         Left wrist fracture: ICD-10: S62.102A, Status: Active         Hyperlipidemia: ICD-10: E78.5, Status: Active         Hypothyroidism: ICD-10: E03.9, Status: Active         Hypertension: ICD-10: I10, Status: Active       Surg History:         History of arthroscopy of left knee: ICD-10: Z98.890, Status:  Active         Status post left oophorectomy: ICD-10: Z90.721, Status: Active        Social History:   Social Substance History:  ·  Smoking Status never smoker (1)   ·  Additional History     Quit smoking in 1979 and was smoking scarcely. (1)           Vitals and Measurements:   Vitals: Temp: 36.6  HR: 52  RR: 18  BP: 165/83  SPO2%:   96   Measurements: HT(cm): 175.2  WT(kg): 117.4  BSA:  2.39  BMI:  38.2   Last 3 Weights & Heights: Date:                           Weight/Scale Type:                    Height:   25-Aug-2023 13:50                117.4  kg                     175.2  cm  26-May-2023 12:45                116.1  kg                     175.2  cm         Physical Exam:      Constitutional: Well developed, awake/alert/oriented  x3, no distress, alert and cooperative   Eyes: clear sclera   ENMT: mucous membranes moist   Head/Neck: Neck supple, no apparent injury, thyroid  without mass or tenderness   Respiratory/Thorax: Patent airways, CTAB, normal   breath sounds with good chest expansion, thorax symmetric   Cardiovascular: Regular, rate and rhythm, no murmurs,  normal S 1and S 2   Gastrointestinal: Nondistended, soft, non-tender,  +BS   Musculoskeletal: normal strength   Extremities: normal extremities, no cyanosis edema   Neurological: alert and oriented x3, normal strength   Lymphatic: No significant lymphadenopathy   Psychological: Appropriate mood and behavior   Skin: Warm and dry, no lesions, no rashes         Lab Results:     ·  Results        CBC date/time       WBC     HGB     HCT     PLT     Neut      21-Aug-2023 09:17   5.9     15.1    45.3    307     3.53  26-May-2023 14:04   15.9(H) 15.3    44.3    333     13.97(H)  30-Mar-2023 09:37   7.7     15.1    44.7    313     5.33  28-Nov-2022 08:41   7.2     14.9    44.2    216     4.51  29-Aug-2022 08:38   10.2    15.3    44.8    332     7.10(H)  05-Jul-2022 09:00   7.6     15.4    45.2    239     4.73  23-May-2022 11:09   6.8     15.5    45.7    275     4.17     BMP date/time       NA              K               CL              CO2             BUN             CREAT             21-Aug-2023 09:17   141             4.8             104             N/A             19              0.87  30-Mar-2023 09:38   143             5.0             106             N/A             16              0.74  28-Nov-2022 08:40   141             4.9             106             N/A             17              0.75  29-Aug-2022 08:37   140             5.0             106             N/A             18              0.81  05-Jul-2022 09:00   143             5.2             106             N/A             12              0.80  23-May-2022 11:10   141             5.5(H)          105             N/A             14              0.76  24-Jan-2022 09:58   141             4.8             105             N/A             21              0.86     Hepatic date/time   T Pro   T Bili  AST     ALT     ALKP    ALB       20-Apr-2020 08:48   6.7      0.5     N/A     26      90      4.2  20-Feb-2020 11:38   6.7     0.6     N/A     23      92      4.4     LDH date/time       LDH     30-Mar-2023 09:38   161  28-Nov-2022 08:40   173  29-Aug-2022 08:37   161  05-Jul-2022 09:00   184  23-May-2022 11:10   179  24-Jan-2022 09:58   159  25-Oct-2021 09:07   184      Latest Reference Range & Units 10/30/23 09:32   GLUCOSE 74 - 99 mg/dL 89   SODIUM 136 - 145 mmol/L 141   POTASSIUM 3.5 - 5.3 mmol/L 4.7   CHLORIDE 98 - 107 mmol/L 105   Bicarbonate 21 - 32 mmol/L 25   Anion Gap 10 - 20 mmol/L 16   Blood Urea Nitrogen 6 - 23 mg/dL 15   Creatinine 0.50 - 1.05 mg/dL 0.82   EGFR >60 mL/min/1.73m*2 75   Calcium 8.6 - 10.6 mg/dL 9.9   Albumin 3.4 - 5.0 g/dL 4.2   Alkaline Phosphatase 33 - 136 U/L 96   ALT 7 - 45 U/L 12   AST 9 - 39 U/L 16   Bilirubin Total 0.0 - 1.2 mg/dL 0.5   Total Protein 6.4 - 8.2 g/dL 6.5   WBC 4.4 - 11.3 x10*3/uL 5.5   nRBC 0.0 - 0.0 /100 WBCs 0.0   RBC 4.00 - 5.20 x10*6/uL 3.84 (L)   HEMOGLOBIN 12.0 - 16.0 g/dL 13.1   HEMATOCRIT 36.0 - 46.0 % 39.8   MCV 80 - 100 fL 104 (H)   MCH 26.0 - 34.0 pg 34.1 (H)   MCHC 32.0 - 36.0 g/dL 32.9   RED CELL DISTRIBUTION WIDTH 11.5 - 14.5 % 14.1   Platelets 150 - 450 x10*3/uL 240   MEAN PLATELET VOLUME 7.5 - 11.5 fL 11.3   Neutrophils % 40.0 - 80.0 % 59.2   Immature Granulocytes %, Automated 0.0 - 0.9 % 0.2   Lymphocytes % 13.0 - 44.0 % 29.9   Monocytes % 2.0 - 10.0 % 8.2   Eosinophils % 0.0 - 6.0 % 2.0   Basophils % 0.0 - 2.0 % 0.5   Neutrophils Absolute 1.60 - 5.50 x10*3/uL 3.23   Immature Granulocytes Absolute, Automated 0.00 - 0.50 x10*3/uL 0.01   Lymphocytes Absolute 0.80 - 3.00 x10*3/uL 1.63   Monocytes Absolute 0.05 - 0.80 x10*3/uL 0.45   Eosinophils Absolute 0.00 - 0.40 x10*3/uL 0.11   Basophils Absolute 0.00 - 0.10 x10*3/uL 0.03   (L): Data is abnormally low  (H): Data is abnormally high  Assessment and Plan:      Assessment and Plan:   Assessment:    Location: [facility]  DX: Demetrio 2+ PV  TX: ASA, Hydrea     Today,  5/26/23, patient presents to clinic for transfer of care from Dr. Maddox to Alessandra Tripp, APRN-CNP.      Work-up vie BMBX 5/2020 revealed  which showed GIRISH-2 positive mutation and was diagnosed with Polycythemia Vera in conjunction with erythrocytosis. Currently asymptomatic and no hx VTE. Discussed diagnosis of polycythemia vera (PV) and importance of treatment.  The goals of care in patients with PV are to reduce the risk of thrombosis, ameliorate symptom burden, and minimize the risk of evolution to post-PV myelofibrosis and/or acute myeloid leukemia/myelodysplastic syndrome (AML/MDS).  Also needs good control  of cardiovascular risk factors - to see PCP or cardiology for this.      Today, 11/6/23, she reports feeling well. No ill SE from Hydrea 1000mg PO daily. On ASA daily. Had rt hip replacement 9/27/23     - cbcd revealed stable h/h 13.1/39.8. Other blood lines normal. Goal HCT <45%, Hgb <15. Has never had phlebotomy to date - reports poor iv access.      - cmp stable     Plan  1. Continue Hydrea 1000mg PO daily. RX sent to pharmacy.   2. Continue ASA 81mg PO daily  3. Follow-up in 3-4mo with labs 2-7 days before appt (cbcd, cmp) - given lab req.    I had an extensive discussion with the patient regarding the diagnosis and discussed the plan of therapy, including general considerations regarding side effects and outcomes. Pt understood and gave appropriate teach back about the plan of care. All questions  were answered to the patient's satisfaction. The patient is instructed to contact us at any time if questions or problems arise. Thank you for the opportunity to participate in the care of this very pleasant patient.     Total time =30 minutes. 50% or more of this time was spent in counseling and/or coordination of care including reviewing medical history/radiology/labs, examining patient, formulating outlined plan with team, and discussing plan with patient/family. I  reviewed patient's chart including but  not limited to labs, imaging, surgical/procedure notes, pathology, hospital notes, doctor's notes.

## 2023-11-09 DIAGNOSIS — Z96.641 STATUS POST HIP REPLACEMENT, RIGHT: Primary | ICD-10-CM

## 2023-11-09 RX ORDER — OXYCODONE AND ACETAMINOPHEN 5; 325 MG/1; MG/1
1 TABLET ORAL EVERY 4 HOURS PRN
Qty: 28 TABLET | Refills: 0 | Status: SHIPPED | OUTPATIENT
Start: 2023-11-09 | End: 2023-11-16

## 2023-12-04 ENCOUNTER — APPOINTMENT (OUTPATIENT)
Dept: HEMATOLOGY/ONCOLOGY | Facility: CLINIC | Age: 73
End: 2023-12-04
Payer: MEDICARE

## 2023-12-13 ENCOUNTER — OFFICE VISIT (OUTPATIENT)
Dept: PRIMARY CARE | Facility: CLINIC | Age: 73
End: 2023-12-13
Payer: MEDICARE

## 2023-12-13 VITALS — DIASTOLIC BLOOD PRESSURE: 84 MMHG | SYSTOLIC BLOOD PRESSURE: 134 MMHG

## 2023-12-13 DIAGNOSIS — I10 PRIMARY HYPERTENSION: ICD-10-CM

## 2023-12-13 DIAGNOSIS — E78.2 MIXED HYPERLIPIDEMIA: ICD-10-CM

## 2023-12-13 DIAGNOSIS — Z00.00 HEALTH CARE MAINTENANCE: ICD-10-CM

## 2023-12-13 DIAGNOSIS — D45 POLYCYTHEMIA VERA (MULTI): Primary | ICD-10-CM

## 2023-12-13 DIAGNOSIS — M16.9 OSTEOARTHRITIS OF HIP, UNSPECIFIED LATERALITY, UNSPECIFIED OSTEOARTHRITIS TYPE: ICD-10-CM

## 2023-12-13 DIAGNOSIS — E03.9 HYPOTHYROIDISM, UNSPECIFIED TYPE: ICD-10-CM

## 2023-12-13 PROCEDURE — 99213 OFFICE O/P EST LOW 20 MIN: CPT | Performed by: STUDENT IN AN ORGANIZED HEALTH CARE EDUCATION/TRAINING PROGRAM

## 2023-12-13 PROCEDURE — 3075F SYST BP GE 130 - 139MM HG: CPT | Performed by: STUDENT IN AN ORGANIZED HEALTH CARE EDUCATION/TRAINING PROGRAM

## 2023-12-13 PROCEDURE — 1125F AMNT PAIN NOTED PAIN PRSNT: CPT | Performed by: STUDENT IN AN ORGANIZED HEALTH CARE EDUCATION/TRAINING PROGRAM

## 2023-12-13 PROCEDURE — 1159F MED LIST DOCD IN RCRD: CPT | Performed by: STUDENT IN AN ORGANIZED HEALTH CARE EDUCATION/TRAINING PROGRAM

## 2023-12-13 PROCEDURE — 1036F TOBACCO NON-USER: CPT | Performed by: STUDENT IN AN ORGANIZED HEALTH CARE EDUCATION/TRAINING PROGRAM

## 2023-12-13 PROCEDURE — 3079F DIAST BP 80-89 MM HG: CPT | Performed by: STUDENT IN AN ORGANIZED HEALTH CARE EDUCATION/TRAINING PROGRAM

## 2023-12-13 NOTE — PROGRESS NOTES
Subjective   Patient ID: Ileana Glover is a 73 y.o. female who presents for Follow-up.    HPI   Routine fu.  Since our last visit, patient had NOEMI. She is working with PT.    Olmesartan was started at our last visit, tolerating this well.    Review of Systems  12-point ROS reviewed and was negative unless otherwise noted in HPI.    Objective   There were no vitals taken for this visit.    Physical Exam  GEN: conversant, NAD  HEENT: PERRL, EOMI, MMM  NECK: supple  CV: S1, S2, RRR  PULM: CTAB  ABD: soft, NT, ND  NEURO: no new gross focal deficits  EXT: no sig LE edema  PSYCH: appropriate affect    Assessment/Plan     #Hypertension: continue amlodipine and olmesartan 5mg daily. Monitor metabolic panel.     #Hypothyroidism: Maintained on levothyroxine to 175mcg daily      #Polycythemia vera/thrombocytosis: Seeing hematology. Continue hydroxyurea, ASA 81 mg daily.     #Hyperlipidemia: Continue statin.      #Obesity, severe, class 2: Continue lifestyle modifications, swimming, weight watchers.     #Hip/Knee/shoulder OA: following with Juan Nelson and Ashleigh. Had shoulder surgery with Dr. Sotelo in 7/2022. Had right NOEMI 9/2023     #Skin lesions: seeing dermatology      #Health maintenance:   Follows with Opthalmology, Dentist regularly.  Follows with gynecology, who orders mammograms regularly.   DEXA performed 2022  Tetanus vaccine in 2013.   Pneumovax 2015. Prevnar 20 recommended  Colonoscopy performed 5/2023, 3 year fu advised. Received Shingrix and COVID-19 vaccine. Advised yearly flu shot.     RTC 3-4 mo

## 2023-12-19 ENCOUNTER — OFFICE VISIT (OUTPATIENT)
Dept: ORTHOPEDIC SURGERY | Facility: CLINIC | Age: 73
End: 2023-12-19
Payer: MEDICARE

## 2023-12-19 ENCOUNTER — ANCILLARY PROCEDURE (OUTPATIENT)
Dept: RADIOLOGY | Facility: CLINIC | Age: 73
End: 2023-12-19
Payer: MEDICARE

## 2023-12-19 VITALS — WEIGHT: 256 LBS | HEIGHT: 70 IN | BODY MASS INDEX: 36.65 KG/M2

## 2023-12-19 DIAGNOSIS — M25.551 HIP PAIN, BILATERAL: ICD-10-CM

## 2023-12-19 DIAGNOSIS — M25.552 HIP PAIN, BILATERAL: ICD-10-CM

## 2023-12-19 DIAGNOSIS — M25.551 HIP PAIN, BILATERAL: Primary | ICD-10-CM

## 2023-12-19 DIAGNOSIS — M25.552 HIP PAIN, BILATERAL: Primary | ICD-10-CM

## 2023-12-19 PROCEDURE — 1159F MED LIST DOCD IN RCRD: CPT | Performed by: ORTHOPAEDIC SURGERY

## 2023-12-19 PROCEDURE — 1125F AMNT PAIN NOTED PAIN PRSNT: CPT | Performed by: ORTHOPAEDIC SURGERY

## 2023-12-19 PROCEDURE — 73522 X-RAY EXAM HIPS BI 3-4 VIEWS: CPT

## 2023-12-19 PROCEDURE — 99213 OFFICE O/P EST LOW 20 MIN: CPT | Performed by: ORTHOPAEDIC SURGERY

## 2023-12-19 PROCEDURE — 1036F TOBACCO NON-USER: CPT | Performed by: ORTHOPAEDIC SURGERY

## 2023-12-19 NOTE — PROGRESS NOTES
Patient had a fall banging her left hip she is concerned about that and it is getting better it has been 2 weeks  Pain is laterally not in the groin.  Uses a cane  Past medical,family and social histories have been reviewed and are up to date.  All other body systems have been reviewed and are negative for complaint.  Constitutional: Well-developed well-nourished   Eyes: Sclerae anicteric, pupils equal and round  HENT: Normocephalic atraumatic  Cardiovascular: Pulses full, regular rate and rhythm  Respiratory: Breathing not labored, no wheezing  Integumentary: Skin intact, no lesions or rashes  Neurological: Sensation intact, no gross strength deficits, reflexes equal  Psychiatric: Alert oriented and appropriate  Hematologic/lymphatic: No lymphadenopathy  Left hip is freely mobile she is tender along the trochanter  X-rays are negative for fracture impression contusion gait protection advance activities as tolerated reassurance follow-up as needed

## 2023-12-22 ENCOUNTER — OFFICE VISIT (OUTPATIENT)
Dept: ORTHOPEDIC SURGERY | Facility: CLINIC | Age: 73
End: 2023-12-22
Payer: MEDICARE

## 2023-12-22 DIAGNOSIS — M17.0 ARTHRITIS OF BOTH KNEES: Primary | ICD-10-CM

## 2023-12-22 PROCEDURE — 1159F MED LIST DOCD IN RCRD: CPT | Performed by: ORTHOPAEDIC SURGERY

## 2023-12-22 PROCEDURE — 20610 DRAIN/INJ JOINT/BURSA W/O US: CPT | Performed by: ORTHOPAEDIC SURGERY

## 2023-12-22 PROCEDURE — 1125F AMNT PAIN NOTED PAIN PRSNT: CPT | Performed by: ORTHOPAEDIC SURGERY

## 2023-12-22 PROCEDURE — 1036F TOBACCO NON-USER: CPT | Performed by: ORTHOPAEDIC SURGERY

## 2023-12-22 NOTE — PROGRESS NOTES
Both knees injected  L Inj/Asp: bilateral knee on 12/22/2023 11:12 AM  Indications: pain  Details: 21 G needle, lateral approach  Medications (Right): 30 mg hyaluronan 30 mg/2 mL

## 2023-12-29 ENCOUNTER — OFFICE VISIT (OUTPATIENT)
Dept: ORTHOPEDIC SURGERY | Facility: CLINIC | Age: 73
End: 2023-12-29
Payer: MEDICARE

## 2023-12-29 VITALS — HEIGHT: 70 IN | WEIGHT: 256 LBS | BODY MASS INDEX: 36.65 KG/M2

## 2023-12-29 DIAGNOSIS — M17.0 ARTHRITIS OF BOTH KNEES: Primary | ICD-10-CM

## 2023-12-29 PROCEDURE — 20610 DRAIN/INJ JOINT/BURSA W/O US: CPT | Performed by: ORTHOPAEDIC SURGERY

## 2023-12-29 PROCEDURE — 1159F MED LIST DOCD IN RCRD: CPT | Performed by: ORTHOPAEDIC SURGERY

## 2023-12-29 PROCEDURE — 1036F TOBACCO NON-USER: CPT | Performed by: ORTHOPAEDIC SURGERY

## 2023-12-29 PROCEDURE — 1125F AMNT PAIN NOTED PAIN PRSNT: CPT | Performed by: ORTHOPAEDIC SURGERY

## 2024-01-05 ENCOUNTER — OFFICE VISIT (OUTPATIENT)
Dept: ORTHOPEDIC SURGERY | Facility: CLINIC | Age: 74
End: 2024-01-05
Payer: MEDICARE

## 2024-01-05 VITALS — WEIGHT: 256 LBS | HEIGHT: 70 IN | BODY MASS INDEX: 36.65 KG/M2

## 2024-01-05 DIAGNOSIS — M17.0 ARTHRITIS OF BOTH KNEES: Primary | ICD-10-CM

## 2024-01-05 PROCEDURE — 1125F AMNT PAIN NOTED PAIN PRSNT: CPT | Performed by: ORTHOPAEDIC SURGERY

## 2024-01-05 PROCEDURE — 1036F TOBACCO NON-USER: CPT | Performed by: ORTHOPAEDIC SURGERY

## 2024-01-05 PROCEDURE — 1159F MED LIST DOCD IN RCRD: CPT | Performed by: ORTHOPAEDIC SURGERY

## 2024-01-08 PROCEDURE — 20610 DRAIN/INJ JOINT/BURSA W/O US: CPT | Performed by: ORTHOPAEDIC SURGERY

## 2024-01-08 NOTE — PROGRESS NOTES
L Inj/Asp: bilateral knee on 1/8/2024 4:34 PM  Indications: pain  Details: 21 G needle  Medications (Right): 30 mg hyaluronan 30 mg/2 mL  Medications (Left): 30 mg hyaluronan 30 mg/2 mL

## 2024-02-05 ENCOUNTER — OFFICE VISIT (OUTPATIENT)
Dept: PRIMARY CARE | Facility: CLINIC | Age: 74
End: 2024-02-05
Payer: MEDICARE

## 2024-02-05 VITALS — WEIGHT: 262 LBS | SYSTOLIC BLOOD PRESSURE: 134 MMHG | BODY MASS INDEX: 37.59 KG/M2 | DIASTOLIC BLOOD PRESSURE: 82 MMHG

## 2024-02-05 DIAGNOSIS — J01.20 ACUTE NON-RECURRENT ETHMOIDAL SINUSITIS: Primary | ICD-10-CM

## 2024-02-05 PROBLEM — Z86.010 HISTORY OF COLONIC POLYPS: Status: ACTIVE | Noted: 2024-02-05

## 2024-02-05 PROBLEM — Z96.612 PRESENCE OF LEFT ARTIFICIAL SHOULDER JOINT: Status: ACTIVE | Noted: 2023-08-30

## 2024-02-05 PROBLEM — R39.15 URINARY URGENCY: Status: ACTIVE | Noted: 2023-07-11

## 2024-02-05 PROBLEM — G83.10 PARESIS OF SINGLE LOWER EXTREMITY (MULTI): Status: ACTIVE | Noted: 2024-02-05

## 2024-02-05 PROBLEM — L85.3 XEROSIS CUTIS: Status: ACTIVE | Noted: 2023-08-10

## 2024-02-05 PROBLEM — D12.2 BENIGN NEOPLASM OF ASCENDING COLON: Status: ACTIVE | Noted: 2024-02-05

## 2024-02-05 PROBLEM — J20.8 ACUTE BACTERIAL BRONCHITIS: Status: ACTIVE | Noted: 2024-02-05

## 2024-02-05 PROBLEM — M17.10 ARTHRITIS OF KNEE: Status: ACTIVE | Noted: 2024-02-05

## 2024-02-05 PROBLEM — D22.9 MELANOCYTIC NEVUS OF SKIN: Status: ACTIVE | Noted: 2023-08-10

## 2024-02-05 PROBLEM — M79.646 THUMB PAIN: Status: ACTIVE | Noted: 2024-02-05

## 2024-02-05 PROBLEM — K92.1 BLOODY STOOL: Status: ACTIVE | Noted: 2024-02-05

## 2024-02-05 PROBLEM — M25.552 PAIN IN LEFT HIP: Status: ACTIVE | Noted: 2023-12-19

## 2024-02-05 PROBLEM — M54.40 LOW BACK PAIN WITH SCIATICA: Status: ACTIVE | Noted: 2023-08-10

## 2024-02-05 PROBLEM — D75.1 ERYTHROCYTOSIS: Status: ACTIVE | Noted: 2024-02-05

## 2024-02-05 PROBLEM — M85.80 OSTEOPENIA: Status: ACTIVE | Noted: 2024-02-05

## 2024-02-05 PROBLEM — D12.3 BENIGN NEOPLASM OF TRANSVERSE COLON: Status: ACTIVE | Noted: 2024-02-05

## 2024-02-05 PROBLEM — M25.551 PAIN IN RIGHT HIP: Status: ACTIVE | Noted: 2023-03-16

## 2024-02-05 PROBLEM — E66.9 OBESITY WITH BODY MASS INDEX 30 OR GREATER: Status: ACTIVE | Noted: 2022-07-12

## 2024-02-05 PROBLEM — M81.0 AGE-RELATED OSTEOPOROSIS WITHOUT CURRENT PATHOLOGICAL FRACTURE: Status: ACTIVE | Noted: 2022-07-12

## 2024-02-05 PROBLEM — M25.569 ARTHRALGIA OF KNEE: Status: ACTIVE | Noted: 2024-02-05

## 2024-02-05 PROBLEM — M25.569 KNEE PAIN: Status: ACTIVE | Noted: 2024-02-05

## 2024-02-05 PROBLEM — R05.8 PRODUCTIVE COUGH: Status: ACTIVE | Noted: 2024-02-05

## 2024-02-05 PROBLEM — Z79.899 OTHER LONG TERM (CURRENT) DRUG THERAPY: Status: ACTIVE | Noted: 2022-07-06

## 2024-02-05 PROBLEM — L03.90 CELLULITIS: Status: ACTIVE | Noted: 2024-02-05

## 2024-02-05 PROBLEM — M23.90 DERANGEMENT OF KNEE: Status: ACTIVE | Noted: 2024-02-05

## 2024-02-05 PROBLEM — M79.661 PAIN IN RIGHT LOWER LEG: Status: ACTIVE | Noted: 2023-07-27

## 2024-02-05 PROBLEM — R32 URINARY INCONTINENCE: Status: ACTIVE | Noted: 2023-07-11

## 2024-02-05 PROBLEM — M17.0 BILATERAL PRIMARY OSTEOARTHRITIS OF KNEE: Status: ACTIVE | Noted: 2023-11-27

## 2024-02-05 PROBLEM — K59.00 CONSTIPATION: Status: ACTIVE | Noted: 2023-07-11

## 2024-02-05 PROBLEM — R00.1 BRADYCARDIA, UNSPECIFIED: Status: ACTIVE | Noted: 2022-07-06

## 2024-02-05 PROBLEM — S52.509A FRACTURE OF DISTAL END OF RADIUS: Status: ACTIVE | Noted: 2023-10-08

## 2024-02-05 PROBLEM — N32.81 OVERACTIVE BLADDER: Status: ACTIVE | Noted: 2024-02-05

## 2024-02-05 PROBLEM — Z98.890 HISTORY OF REPAIR OF HIP JOINT: Status: ACTIVE | Noted: 2023-10-10

## 2024-02-05 PROBLEM — M25.519 ARTHRALGIA OF SHOULDER: Status: ACTIVE | Noted: 2024-02-05

## 2024-02-05 PROBLEM — Z15.89 GENETIC SUSCEPTIBILITY TO OTHER DISEASE: Status: ACTIVE | Noted: 2023-05-26

## 2024-02-05 PROBLEM — R51.9 HEADACHE: Status: ACTIVE | Noted: 2024-02-05

## 2024-02-05 PROBLEM — J02.9 ACUTE PHARYNGITIS: Status: ACTIVE | Noted: 2024-02-05

## 2024-02-05 PROBLEM — R42 VERTIGO: Status: ACTIVE | Noted: 2024-02-05

## 2024-02-05 PROBLEM — E66.01 SEVERE OBESITY (MULTI): Status: ACTIVE | Noted: 2023-05-16

## 2024-02-05 PROBLEM — M75.102 UNSPECIFIED ROTATOR CUFF TEAR OR RUPTURE OF LEFT SHOULDER, NOT SPECIFIED AS TRAUMATIC: Status: ACTIVE | Noted: 2022-07-12

## 2024-02-05 PROBLEM — M25.512 PAIN IN LEFT SHOULDER: Status: ACTIVE | Noted: 2022-10-04

## 2024-02-05 PROBLEM — M25.511 PAIN IN RIGHT SHOULDER: Status: ACTIVE | Noted: 2022-10-04

## 2024-02-05 PROBLEM — Z87.891 PERSONAL HISTORY OF NICOTINE DEPENDENCE: Status: ACTIVE | Noted: 2022-07-12

## 2024-02-05 PROBLEM — E66.9 OBESITY, UNSPECIFIED: Status: ACTIVE | Noted: 2022-07-12

## 2024-02-05 PROBLEM — M25.559 ARTHRALGIA OF HIP: Status: ACTIVE | Noted: 2024-02-05

## 2024-02-05 PROBLEM — M79.89 SWELLING OF LOWER EXTREMITY: Status: ACTIVE | Noted: 2024-02-05

## 2024-02-05 PROBLEM — K57.30 DIVERTICULOSIS OF LARGE INTESTINE: Status: ACTIVE | Noted: 2024-02-05

## 2024-02-05 PROBLEM — L82.1 OTHER SEBORRHEIC KERATOSIS: Status: ACTIVE | Noted: 2023-07-05

## 2024-02-05 PROBLEM — M19.079 ARTHRITIS OF FOOT: Status: ACTIVE | Noted: 2024-02-05

## 2024-02-05 PROBLEM — M47.22 OTHER SPONDYLOSIS WITH RADICULOPATHY, CERVICAL REGION: Status: ACTIVE | Noted: 2023-08-30

## 2024-02-05 PROBLEM — D75.839 THROMBOCYTHEMIA: Status: ACTIVE | Noted: 2022-07-12

## 2024-02-05 PROBLEM — Z86.0100 HISTORY OF COLONIC POLYPS: Status: ACTIVE | Noted: 2024-02-05

## 2024-02-05 PROBLEM — R21 RASH: Status: ACTIVE | Noted: 2023-08-10

## 2024-02-05 PROBLEM — D12.8 BENIGN NEOPLASM OF RECTUM: Status: ACTIVE | Noted: 2024-02-05

## 2024-02-05 PROBLEM — M79.604 PAIN OF RIGHT LOWER EXTREMITY: Status: ACTIVE | Noted: 2024-02-05

## 2024-02-05 PROBLEM — M85.80 OTHER SPECIFIED DISORDERS OF BONE DENSITY AND STRUCTURE, UNSPECIFIED SITE: Status: ACTIVE | Noted: 2022-10-15

## 2024-02-05 PROBLEM — B96.89 ACUTE BACTERIAL BRONCHITIS: Status: ACTIVE | Noted: 2024-02-05

## 2024-02-05 PROBLEM — D22.5 MELANOCYTIC NEVI OF TRUNK: Status: ACTIVE | Noted: 2023-07-05

## 2024-02-05 PROBLEM — D22.60 MELANOCYTIC NEVI OF UNSPECIFIED UPPER LIMB, INCLUDING SHOULDER: Status: ACTIVE | Noted: 2023-07-05

## 2024-02-05 PROBLEM — M25.9 DISORDER OF ANKLE: Status: ACTIVE | Noted: 2024-02-05

## 2024-02-05 PROBLEM — Z79.82 LONG TERM (CURRENT) USE OF ASPIRIN: Status: ACTIVE | Noted: 2023-09-22

## 2024-02-05 PROBLEM — K21.9 GASTROESOPHAGEAL REFLUX DISEASE: Status: ACTIVE | Noted: 2022-07-12

## 2024-02-05 PROBLEM — S86.909A INJURY OF TENDON OF LOWER EXTREMITY: Status: ACTIVE | Noted: 2022-07-12

## 2024-02-05 PROBLEM — D22.39 MELANOCYTIC NEVI OF OTHER PARTS OF FACE: Status: ACTIVE | Noted: 2023-07-05

## 2024-02-05 PROBLEM — D12.0 BENIGN NEOPLASM OF CECUM: Status: ACTIVE | Noted: 2024-02-05

## 2024-02-05 PROBLEM — L98.9 SKIN LESION: Status: ACTIVE | Noted: 2024-02-05

## 2024-02-05 PROBLEM — R53.83 FATIGUE: Status: ACTIVE | Noted: 2024-02-05

## 2024-02-05 PROBLEM — D22.62 MELANOCYTIC NEVI OF LEFT UPPER LIMB, INCLUDING SHOULDER: Status: ACTIVE | Noted: 2019-12-04

## 2024-02-05 PROBLEM — L23.7 ALLERGIC CONTACT DERMATITIS DUE TO PLANT: Status: ACTIVE | Noted: 2022-07-12

## 2024-02-05 PROBLEM — M62.89 PELVIC FLOOR DYSFUNCTION: Status: ACTIVE | Noted: 2023-07-11

## 2024-02-05 PROBLEM — K63.5 POLYP OF COLON: Status: ACTIVE | Noted: 2024-02-05

## 2024-02-05 PROBLEM — Z20.822 CONTACT WITH AND (SUSPECTED) EXPOSURE TO COVID-19: Status: ACTIVE | Noted: 2022-07-11

## 2024-02-05 PROBLEM — M79.673 PAIN OF FOOT: Status: ACTIVE | Noted: 2024-02-05

## 2024-02-05 PROBLEM — N95.2 ATROPHIC VAGINITIS: Status: ACTIVE | Noted: 2023-07-11

## 2024-02-05 PROBLEM — M54.50 LOW BACK PAIN, UNSPECIFIED: Status: ACTIVE | Noted: 2023-05-04

## 2024-02-05 PROBLEM — M72.2 PLANTAR FASCIITIS: Status: ACTIVE | Noted: 2024-02-05

## 2024-02-05 PROCEDURE — 1036F TOBACCO NON-USER: CPT | Performed by: STUDENT IN AN ORGANIZED HEALTH CARE EDUCATION/TRAINING PROGRAM

## 2024-02-05 PROCEDURE — 1160F RVW MEDS BY RX/DR IN RCRD: CPT | Performed by: STUDENT IN AN ORGANIZED HEALTH CARE EDUCATION/TRAINING PROGRAM

## 2024-02-05 PROCEDURE — 99213 OFFICE O/P EST LOW 20 MIN: CPT | Performed by: STUDENT IN AN ORGANIZED HEALTH CARE EDUCATION/TRAINING PROGRAM

## 2024-02-05 PROCEDURE — 3075F SYST BP GE 130 - 139MM HG: CPT | Performed by: STUDENT IN AN ORGANIZED HEALTH CARE EDUCATION/TRAINING PROGRAM

## 2024-02-05 PROCEDURE — 3079F DIAST BP 80-89 MM HG: CPT | Performed by: STUDENT IN AN ORGANIZED HEALTH CARE EDUCATION/TRAINING PROGRAM

## 2024-02-05 PROCEDURE — 1159F MED LIST DOCD IN RCRD: CPT | Performed by: STUDENT IN AN ORGANIZED HEALTH CARE EDUCATION/TRAINING PROGRAM

## 2024-02-05 PROCEDURE — 1125F AMNT PAIN NOTED PAIN PRSNT: CPT | Performed by: STUDENT IN AN ORGANIZED HEALTH CARE EDUCATION/TRAINING PROGRAM

## 2024-02-05 RX ORDER — FLUTICASONE PROPIONATE 50 MCG
1 SPRAY, SUSPENSION (ML) NASAL DAILY
Qty: 16 G | Refills: 0 | Status: SHIPPED | OUTPATIENT
Start: 2024-02-05 | End: 2025-02-04

## 2024-02-05 RX ORDER — AZITHROMYCIN 250 MG/1
TABLET, FILM COATED ORAL
Qty: 6 TABLET | Refills: 0 | Status: SHIPPED | OUTPATIENT
Start: 2024-02-05 | End: 2024-02-10

## 2024-02-05 RX ORDER — FLUTICASONE PROPIONATE 50 MCG
1 SPRAY, SUSPENSION (ML) NASAL DAILY
Qty: 16 G | Refills: 0 | Status: SHIPPED | OUTPATIENT
Start: 2024-02-05 | End: 2024-02-05 | Stop reason: SDUPTHER

## 2024-02-05 RX ORDER — AZITHROMYCIN 250 MG/1
TABLET, FILM COATED ORAL
Qty: 6 TABLET | Refills: 0 | Status: SHIPPED | OUTPATIENT
Start: 2024-02-05 | End: 2024-02-05 | Stop reason: SDUPTHER

## 2024-02-05 ASSESSMENT — ENCOUNTER SYMPTOMS
FACIAL SWELLING: 1
GASTROINTESTINAL NEGATIVE: 1
CONSTITUTIONAL NEGATIVE: 1
MUSCULOSKELETAL NEGATIVE: 1
HEADACHES: 1
CARDIOVASCULAR NEGATIVE: 1
PSYCHIATRIC NEGATIVE: 1
RESPIRATORY NEGATIVE: 1
SINUS PAIN: 1

## 2024-02-05 NOTE — PROGRESS NOTES
Headache for over 1 week    Subjective   Patient ID: Ileana Glover is a 74 y.o. female.    HPI    Review of Systems    Objective   Physical Exam    Assessment/Plan   There are no diagnoses linked to this encounter.

## 2024-02-05 NOTE — PROGRESS NOTES
Subjective   Patient ID: Ileana Glover is a 74 y.o. female.    Patient seen on sick visit.  She regards that she has been having some headaches and congestion for the past week or so.  Has been taking over-the-counter sinus medication with very minimal improvement.  Regards no changes to her medications.  Some of her family members have tested positive for COVID however she was not around them.  Regards no other symptoms, no blurred vision.  Has not been monitoring her blood pressure at home.  Denies any additional issues or concerns.    Headache         Review of Systems   Constitutional: Negative.    HENT:  Positive for congestion, facial swelling and sinus pain.    Respiratory: Negative.     Cardiovascular: Negative.    Gastrointestinal: Negative.    Musculoskeletal: Negative.    Neurological:  Positive for headaches.   Psychiatric/Behavioral: Negative.         Objective Visit Vitals  /82   Wt 119 kg (262 lb)   BMI 37.59 kg/m²   Smoking Status Former   BSA 2.42 m²      Physical Exam  Constitutional:       General: She is not in acute distress.     Appearance: She is not ill-appearing.   HENT:      Nose: Congestion present.      Comments: Sinus pressure     Mouth/Throat:      Comments: Tenderness over ethmoid sinus  Eyes:      Pupils: Pupils are equal, round, and reactive to light.   Cardiovascular:      Rate and Rhythm: Normal rate and regular rhythm.      Pulses: Normal pulses.      Heart sounds: No murmur heard.  Pulmonary:      Effort: No respiratory distress.      Breath sounds: No wheezing.   Abdominal:      General: Abdomen is flat. Bowel sounds are normal. There is no distension.   Musculoskeletal:      Right lower leg: No edema.      Left lower leg: No edema.   Skin:     General: Skin is warm and dry.   Neurological:      Mental Status: She is alert. Mental status is at baseline.      Cranial Nerves: No cranial nerve deficit.      Motor: No weakness.   Psychiatric:         Mood and Affect: Mood  normal.         Behavior: Behavior normal.         Assessment/Plan   Diagnoses and all orders for this visit:  Acute non-recurrent ethmoidal sinusitis  -     azithromycin (Zithromax) 250 mg tablet; Take 2 tablets (500 mg) by mouth once daily for 1 day, THEN 1 tablet (250 mg) once daily for 4 days. Take 2 tabs (500 mg) by mouth today, than 1 daily for 4 days..  -     fluticasone (Flonase) 50 mcg/actuation nasal spray; Administer 1 spray into each nostril once daily. Shake gently. Before first use, prime pump. After use, clean tip and replace cap.    Patient seen on sick visit    #Sinusitis  #Headache  Suspect headache is secondary to chronic sinusitis, due to chronicity of symptoms, recommend that patient undergoes a course of antibiotics with azithromycin as well as Flonase  If symptoms persist, could consider prophylactic headache medication however patient's symptoms are more sinus due to congestion and tenderness over sinus cavities   She will call if symptoms worsen or do not improve  She is agreeable with this plan    Return to care as previous scheduled or as needed

## 2024-02-20 ENCOUNTER — HOSPITAL ENCOUNTER (OUTPATIENT)
Dept: RADIOLOGY | Facility: CLINIC | Age: 74
Discharge: HOME | End: 2024-02-20
Payer: MEDICARE

## 2024-02-20 ENCOUNTER — OFFICE VISIT (OUTPATIENT)
Dept: ORTHOPEDIC SURGERY | Facility: CLINIC | Age: 74
End: 2024-02-20
Payer: MEDICARE

## 2024-02-20 VITALS — WEIGHT: 265 LBS | HEIGHT: 70 IN | BODY MASS INDEX: 37.94 KG/M2

## 2024-02-20 DIAGNOSIS — M25.551 RIGHT HIP PAIN: ICD-10-CM

## 2024-02-20 DIAGNOSIS — M76.891 TENDONITIS OF RIGHT HIP: ICD-10-CM

## 2024-02-20 PROCEDURE — 1159F MED LIST DOCD IN RCRD: CPT | Performed by: ORTHOPAEDIC SURGERY

## 2024-02-20 PROCEDURE — 73502 X-RAY EXAM HIP UNI 2-3 VIEWS: CPT | Mod: RIGHT SIDE | Performed by: STUDENT IN AN ORGANIZED HEALTH CARE EDUCATION/TRAINING PROGRAM

## 2024-02-20 PROCEDURE — 1036F TOBACCO NON-USER: CPT | Performed by: ORTHOPAEDIC SURGERY

## 2024-02-20 PROCEDURE — 99213 OFFICE O/P EST LOW 20 MIN: CPT | Performed by: ORTHOPAEDIC SURGERY

## 2024-02-20 PROCEDURE — 73502 X-RAY EXAM HIP UNI 2-3 VIEWS: CPT | Mod: RT

## 2024-02-20 PROCEDURE — 1160F RVW MEDS BY RX/DR IN RCRD: CPT | Performed by: ORTHOPAEDIC SURGERY

## 2024-02-20 PROCEDURE — 1125F AMNT PAIN NOTED PAIN PRSNT: CPT | Performed by: ORTHOPAEDIC SURGERY

## 2024-02-20 RX ORDER — MELOXICAM 15 MG/1
15 TABLET ORAL DAILY
Qty: 30 TABLET | Refills: 2 | Status: SHIPPED | OUTPATIENT
Start: 2024-02-20 | End: 2024-05-20

## 2024-02-20 ASSESSMENT — PAIN DESCRIPTION - DESCRIPTORS: DESCRIPTORS: BURNING;ACHING

## 2024-02-20 ASSESSMENT — PAIN SCALES - GENERAL: PAINLEVEL_OUTOF10: 5 - MODERATE PAIN

## 2024-02-20 ASSESSMENT — PAIN - FUNCTIONAL ASSESSMENT: PAIN_FUNCTIONAL_ASSESSMENT: 0-10

## 2024-02-20 NOTE — PROGRESS NOTES
Patient presents with some right hip symptoms mostly in the buttock area in the lateral aspect of the hip and some radiation to the groin no history of fall fevers chills or injury  She does use a shoe lift in her contralateral leg  Past medical,family and social histories have been reviewed and are up to date.  All other body systems have been reviewed and are negative for complaint.  Constitutional: Well-developed well-nourished   Eyes: Sclerae anicteric, pupils equal and round  HENT: Normocephalic atraumatic  Cardiovascular: Pulses full, regular rate and rhythm  Respiratory: Breathing not labored, no wheezing  Integumentary: Skin intact, no lesions or rashes  Neurological: Sensation intact, no gross strength deficits, reflexes equal  Psychiatric: Alert oriented and appropriate  Hematologic/lymphatic: No lymphadenopathy  Right hip mobility is excellent and not able to reproduce her pain  X-rays show no evidence of implant loosening  Impression soft tissue issue plan try physical therapy and meloxicam

## 2024-02-21 ENCOUNTER — APPOINTMENT (OUTPATIENT)
Dept: HEMATOLOGY/ONCOLOGY | Facility: CLINIC | Age: 74
End: 2024-02-21
Payer: MEDICARE

## 2024-02-26 ENCOUNTER — LAB (OUTPATIENT)
Dept: LAB | Facility: LAB | Age: 74
End: 2024-02-26
Payer: MEDICARE

## 2024-02-26 DIAGNOSIS — D45 POLYCYTHEMIA VERA (MULTI): ICD-10-CM

## 2024-02-26 LAB
ALBUMIN SERPL BCP-MCNC: 4.4 G/DL (ref 3.4–5)
ALP SERPL-CCNC: 86 U/L (ref 33–136)
ALT SERPL W P-5'-P-CCNC: 17 U/L (ref 7–45)
ANION GAP SERPL CALC-SCNC: 15 MMOL/L (ref 10–20)
AST SERPL W P-5'-P-CCNC: 17 U/L (ref 9–39)
BASOPHILS # BLD AUTO: 0.04 X10*3/UL (ref 0–0.1)
BASOPHILS NFR BLD AUTO: 0.5 %
BILIRUB SERPL-MCNC: 0.6 MG/DL (ref 0–1.2)
BUN SERPL-MCNC: 21 MG/DL (ref 6–23)
CALCIUM SERPL-MCNC: 9.5 MG/DL (ref 8.6–10.6)
CHLORIDE SERPL-SCNC: 104 MMOL/L (ref 98–107)
CO2 SERPL-SCNC: 27 MMOL/L (ref 21–32)
CREAT SERPL-MCNC: 0.82 MG/DL (ref 0.5–1.05)
EGFRCR SERPLBLD CKD-EPI 2021: 75 ML/MIN/1.73M*2
EOSINOPHIL # BLD AUTO: 0.06 X10*3/UL (ref 0–0.4)
EOSINOPHIL NFR BLD AUTO: 0.8 %
ERYTHROCYTE [DISTWIDTH] IN BLOOD BY AUTOMATED COUNT: 14.2 % (ref 11.5–14.5)
GLUCOSE SERPL-MCNC: 91 MG/DL (ref 74–99)
HCT VFR BLD AUTO: 48.7 % (ref 36–46)
HGB BLD-MCNC: 15.3 G/DL (ref 12–16)
IMM GRANULOCYTES # BLD AUTO: 0.03 X10*3/UL (ref 0–0.5)
IMM GRANULOCYTES NFR BLD AUTO: 0.4 % (ref 0–0.9)
LYMPHOCYTES # BLD AUTO: 1.98 X10*3/UL (ref 0.8–3)
LYMPHOCYTES NFR BLD AUTO: 26.7 %
MCH RBC QN AUTO: 32.1 PG (ref 26–34)
MCHC RBC AUTO-ENTMCNC: 31.4 G/DL (ref 32–36)
MCV RBC AUTO: 102 FL (ref 80–100)
MONOCYTES # BLD AUTO: 0.4 X10*3/UL (ref 0.05–0.8)
MONOCYTES NFR BLD AUTO: 5.4 %
NEUTROPHILS # BLD AUTO: 4.91 X10*3/UL (ref 1.6–5.5)
NEUTROPHILS NFR BLD AUTO: 66.2 %
NRBC BLD-RTO: 0 /100 WBCS (ref 0–0)
PLATELET # BLD AUTO: 272 X10*3/UL (ref 150–450)
POTASSIUM SERPL-SCNC: 4.6 MMOL/L (ref 3.5–5.3)
PROT SERPL-MCNC: 6.4 G/DL (ref 6.4–8.2)
RBC # BLD AUTO: 4.77 X10*6/UL (ref 4–5.2)
SODIUM SERPL-SCNC: 141 MMOL/L (ref 136–145)
WBC # BLD AUTO: 7.4 X10*3/UL (ref 4.4–11.3)

## 2024-02-26 PROCEDURE — 36415 COLL VENOUS BLD VENIPUNCTURE: CPT

## 2024-02-26 PROCEDURE — 85025 COMPLETE CBC W/AUTO DIFF WBC: CPT

## 2024-02-26 PROCEDURE — 80053 COMPREHEN METABOLIC PANEL: CPT

## 2024-02-28 ENCOUNTER — TELEPHONE (OUTPATIENT)
Dept: HEMATOLOGY/ONCOLOGY | Facility: HOSPITAL | Age: 74
End: 2024-02-28
Payer: MEDICARE

## 2024-02-28 NOTE — TELEPHONE ENCOUNTER
RN called patient and left message on voice mail asking her to call back. Call back instructions reviewed.

## 2024-03-04 ENCOUNTER — OFFICE VISIT (OUTPATIENT)
Dept: HEMATOLOGY/ONCOLOGY | Facility: HOSPITAL | Age: 74
End: 2024-03-04
Payer: MEDICARE

## 2024-03-04 ENCOUNTER — DOCUMENTATION (OUTPATIENT)
Dept: HEMATOLOGY/ONCOLOGY | Facility: HOSPITAL | Age: 74
End: 2024-03-04
Payer: MEDICARE

## 2024-03-04 ENCOUNTER — TELEPHONE (OUTPATIENT)
Dept: HEMATOLOGY/ONCOLOGY | Facility: HOSPITAL | Age: 74
End: 2024-03-04

## 2024-03-04 VITALS
TEMPERATURE: 97.2 F | DIASTOLIC BLOOD PRESSURE: 69 MMHG | BODY MASS INDEX: 40.13 KG/M2 | RESPIRATION RATE: 20 BRPM | WEIGHT: 270.95 LBS | SYSTOLIC BLOOD PRESSURE: 139 MMHG | HEART RATE: 51 BPM | OXYGEN SATURATION: 97 % | HEIGHT: 69 IN

## 2024-03-04 DIAGNOSIS — D45 POLYCYTHEMIA VERA (MULTI): ICD-10-CM

## 2024-03-04 DIAGNOSIS — E61.1 IRON DEFICIENCY: Primary | ICD-10-CM

## 2024-03-04 DIAGNOSIS — D45 JAK2 POSITIVE POLYCYTHEMIA VERA (MULTI): Primary | ICD-10-CM

## 2024-03-04 PROCEDURE — 3078F DIAST BP <80 MM HG: CPT | Performed by: STUDENT IN AN ORGANIZED HEALTH CARE EDUCATION/TRAINING PROGRAM

## 2024-03-04 PROCEDURE — 99215 OFFICE O/P EST HI 40 MIN: CPT | Performed by: STUDENT IN AN ORGANIZED HEALTH CARE EDUCATION/TRAINING PROGRAM

## 2024-03-04 PROCEDURE — 1160F RVW MEDS BY RX/DR IN RCRD: CPT | Performed by: STUDENT IN AN ORGANIZED HEALTH CARE EDUCATION/TRAINING PROGRAM

## 2024-03-04 PROCEDURE — 1036F TOBACCO NON-USER: CPT | Performed by: STUDENT IN AN ORGANIZED HEALTH CARE EDUCATION/TRAINING PROGRAM

## 2024-03-04 PROCEDURE — 3075F SYST BP GE 130 - 139MM HG: CPT | Performed by: STUDENT IN AN ORGANIZED HEALTH CARE EDUCATION/TRAINING PROGRAM

## 2024-03-04 PROCEDURE — 1159F MED LIST DOCD IN RCRD: CPT | Performed by: STUDENT IN AN ORGANIZED HEALTH CARE EDUCATION/TRAINING PROGRAM

## 2024-03-04 PROCEDURE — 1125F AMNT PAIN NOTED PAIN PRSNT: CPT | Performed by: STUDENT IN AN ORGANIZED HEALTH CARE EDUCATION/TRAINING PROGRAM

## 2024-03-04 ASSESSMENT — PAIN SCALES - GENERAL: PAINLEVEL: 4

## 2024-03-04 NOTE — PROGRESS NOTES
Patient ID: Ileana Glover is a 74 y.o. female.  Referring Physician: Alessandra Tripp, APRN-CNP  [address]  [address]  [place] [address]  Primary Care Provider: Ghassan Bush MD  Visit Type: Initial Visit  Diagnosis: JAK2 +ve PV       Subjective    HPI  74 y.o. 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   Previously followed by Alessandra Tripp NP, previously followed with .    BMBX 5/2020 revealed  which showed GIRISH-2 positive mutation and was diagnosed with Polycythemia Vera in conjunction with erythrocytosis. Currently asymptomatic and no hx VTE.   Did not have phlebotomies as she does not like needles. Currently on hydrea 1000mg daily, dose last increased about a year back.   Has gained 20lbs in a couple months. Has not missed any doses. Does not note h/o ARIELLE/OHS.   No supplements. Has been adherent on her medications.     Age appropriate cancer screening: last colo 1yr back WNL, mammo 1/24 WNL. No p/h/o cancer.   FMH: father had a stroke at 76yrs   Social history: never smoker, occasionally drinks, no RDA.     Review of Systems - Oncology  10 point review of systems negative except as stated in HPI    Objective   Past Surgical History:   Procedure Laterality Date    KNEE ARTHROSCOPY W/ DEBRIDEMENT  12/10/2013    Arthroscopy Knee Left    OOPHORECTOMY  08/12/2014    Oophorectomy - Unilateral (Removal Of One Ovary)    OTHER SURGICAL HISTORY  03/08/2022    Tonsillectomy    OTHER SURGICAL HISTORY  01/30/2017    Wrist Surgery     Family History   Problem Relation Name Age of Onset    No Known Problems Mother      Breast cancer Mother's Sister      Breast cancer Other      Hypertension Other      Lung cancer Other      Other (stroke syndrome) Other       Oncology History    No history exists.       Physical Exam  Vitals reviewed.   Constitutional:       General: She is not in acute distress.     Appearance:  "She is not toxic-appearing.      Comments: BMI 40   HENT:      Head: Normocephalic and atraumatic.   Eyes:      Comments: No pallor or icterus    Neck:      Comments: No palpable cervical or axillary adenopathy   Cardiovascular:      Rate and Rhythm: Normal rate and regular rhythm.      Heart sounds: No murmur heard.  Pulmonary:      Effort: Pulmonary effort is normal.   Abdominal:      Tenderness: There is no abdominal tenderness.      Comments: No palpable splenomegaly    Neurological:      General: No focal deficit present.      Mental Status: She is oriented to person, place, and time.   Psychiatric:         Mood and Affect: Mood normal.       BSA: 2.44 meters squared  /69   Pulse 51   Temp 36.2 °C (97.2 °F) (Core)   Resp 20   Ht 1.746 m (5' 8.74\")   Wt 123 kg (270 lb 15.1 oz)   SpO2 97%   BMI 40.31 kg/m²     Labs:  Lab Results   Component Value Date    WBC 7.4 02/26/2024    NEUTROABS 4.91 02/26/2024    IGABSOL 0.03 02/26/2024    LYMPHSABS 1.98 02/26/2024    MONOSABS 0.40 02/26/2024    EOSABS 0.06 02/26/2024    BASOSABS 0.04 02/26/2024    RBC 4.77 02/26/2024     (H) 02/26/2024    MCHC 31.4 (L) 02/26/2024    HGB 15.3 02/26/2024    HCT 48.7 (H) 02/26/2024     02/26/2024     Lab Results   Component Value Date    RETICCTPCT 1.5 04/19/2021      Lab Results   Component Value Date    CREATININE 0.82 02/26/2024    BUN 21 02/26/2024    EGFR 75 02/26/2024     02/26/2024    K 4.6 02/26/2024     02/26/2024    CO2 27 02/26/2024      Lab Results   Component Value Date    ALT 17 02/26/2024    AST 17 02/26/2024    ALKPHOS 86 02/26/2024    BILITOT 0.6 02/26/2024      Lab Results   Component Value Date    TSH 1.45 09/12/2023     Lab Results   Component Value Date    TSH 1.45 09/12/2023     Lab Results   Component Value Date    IRON 106 01/23/2020    TIBC 318 01/23/2020    FERRITIN 107 01/23/2020      Lab Results   Component Value Date    TMAEXZXC73 373 01/19/2021      No results found for: " "\"FOLATE\"  Lab Results   Component Value Date    SEDRATE 10 02/20/2020      No results found for: \"CRP\"   No results found for: \"AMINA\"  Lab Results   Component Value Date     03/30/2023     No results found for: \"HAPTOGLOBIN\"  Lab Results   Component Value Date    SPEP NORMAL 02/20/2020     Lab Results   Component Value Date     02/20/2020     02/20/2020     02/20/2020     No components found for: \"PT\"  aPTT   Date/Time Value Ref Range Status   07/06/2022 07:35 AM 26.8 22.0 - 32.5 SEC Final     Comment:     Performed at [facility] [address] [facility] [address]     BMBx 5/2020: NORMOCELLULAR BONE MARROW (20-30%) WITH TRILINEAGE HEMATOPOIESIS, MILDLY  INCREASED RETICULIN FIBROSIS AND JAK2 EXON 12 MUTATION, SEE NOTE.    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 Age-adjusted marrow hypercellularity with panmyelosis is one of the three major criteria used to  establish a diagnosis of polycythemia vera. However, WHO criteria allow for the diagnosis in the absence of bone marrow biopsy (thereby omitting the criterion of demonstrating age-adjusted hypercellularity). The patient has a reported history of sustained erythrocytosis and myeloid NGS identified JAK2 exon 12 mutation with VAF of 12%, thereby fulfilling the other two major criteria. In large population studies JAK2 V617F but not the exon 12 mutation have been identified in clonal hematopoiesis of indeterminate potential (CHIP; Yelitza N Engl J Med 2014; 371:2488-249 ). Per EHR, erythropoietin levels are at the very low end of normal, consistent with EPO-independent erythroid proliferation. The overall combination of findings in this patient are most consistent with a diagnosis of polycythemia vera.    Flow cytometric studies identified a small CD8+ T-cell population (around 3% of events) similar to that identified previously in blood. The population has a " T-large granular lymphocytic leukemia (T-LGL)-like phenotype. No significant marrow infiltration was identified by immunostains. The findings are NOT diagnostic for T-LGL.    Pending Genetic/Molecular testing per Hematopathology protocol:  -Chromosome analysis  -FISH: none  -Molecular: Myeloid NGS panel  The results will be reported separately.    Differential:           (Normal)    %    Promyelocytes         (1-5)       2.5  Myelocytes &   Metamyelocytes    (15-35)     20  Bands & Segs        (15-50)     34.5  Eosinophils               (2-4)      3  Basophils                  (0-1)      0  Lymphocytes           (5-25)     12  Monocytes                (0-2)      0  Plasma Cells             (0-2)     2  Blasts                        (0-1)     0.5  Total Erythroid        (17-35)    25.5  Number of cells counted: 200    Cellularity: Cellular              M:E Ratio: 2.5:1                                                            Electronically Signed Out By RADHA MARIN MD, PhD/CBR  By the signature on this report, the individual or group listed as making the  Final Interpretation/Diagnosis certifies that they have reviewed this case.           Addendum/Procedures:  Special Oncology Report     Date Ordered:     6/2/2020     Status:  Signed Out       Date Complete:     6/2/2020             Date Reported:     6/2/2020             Addendum Diagnosis  TEST: Myeloid NGS Panel  SPECIMEN: Bone Marrow  COLLECTION DATE: 05/27/2020  RECEIVED DATE: 05/29/2020  REPORT DATE: 06/02/2020    DISEASE ASSOCIATED GENOMIC FINDINGS:  JAK2 Exon 12 mutation [id number] ([id number]: [id number])    INTERPRETATION AND POTENTIAL THERAPEUTIC OPTIONS:  JAK2 Exon 12 mutation [id number]      VAF: 12%  Note: The presence of a JAK2 exon 12 mutation fulfills a major WHO diagnostic criterion for establishing diagnosis of polycythemia vera (NCCN 2019).    Microscopic Description:  CBC: WBC 7.4 x 10E9/L, RBC 5.3 x 10E12/L, Hgb  16 g/dl, Hct 48.1%, MCV 91 fL, RDW 14.7%, platelets 435 x 10E9/L.  A 100 cell manual differential count reveals: polys 66%, lymphocytes 26%, monocytes 5%, eosinophils 3%.    PERIPHERAL SMEAR: Submitted             Red cells: Normal.       White cells: Occasional large granular lymphocytes and rare reactive-appearing lymphocytes.       Platelets: Normal, adequate.    ASPIRATE SMEAR: Submitted                         Specimen: Spicular.       Erythropoiesis: Normal.       Granulopoiesis: Normal.       Megakaryocytes: Present. Morphology: A subset of large and/or hyperlobulated megakaryocytes are present.    TOUCH PREP: Submitted       Specimen: Paucicellular.    ASPIRATE CLOT: Submitted                               Specimen: Aspicular.       Comments: There is a small focus degenerating non-hematopoietic cells present (<20 cells), most likely contamination.    CORE BIOPSY: Submitted                           Specimen: Adequate. Aspiration artifact present.       Cellularity: 20-30%.       Estimated M:E ratio: Consistent with aspirate smear.       Bony trabeculae: Normal.       Megakaryocytes: Adequate to slightly increased. Morphology: Occasional larger forms present. No significant clustering identified.       Granulomas: Absent.       Lymphoid aggregates: Absent.    SPECIAL STAINS:         Iron: Not able to be evaluated on aspicular clot.       Reticulin: Highlights mild increase in reticulin fobrosis (WHO grade MF-1)    IMMUNOHISTOCHEMISTRY: Performed.       CD20: Highlights scattered B-cells and a subset of cells within several  small aggregates       CD3: Highlights moderately numerous T-cells, including a subset of cells within lymphoid aggregates     CD4: Highlights a subset of T-cells; also weaky positive in moderately numerous monocytic cells       CD8: Highlights a subset of T-cells, in slight excess to CD4+ T-cells       CD56: Rare scattered positive cells       CD34: Highlights 1-2%  blasts    Assessment/Plan    74 y.o. 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   Previously followed by Alessandra Tripp NP, previously followed with .       # JAK2 exon 12 mutated polycythemia vera: confirmed on BMBx. Has been on hydrea, but has had very few labs which have noted hct consistently >45, despite having a knee replacement. Her most recent labs showed the same. Attempted to add on iron panel, which was canceled by lab, pt to redraw.   Plan:  - pt preference to avoid phlebotomy in the long term, however she has significantly over 45. Will order same for now x1 and depending on rate of rise with increase dose of hydrea  - follow up next: 3 months   - labs prior to follow up: CBC/diff, CMP    Jayden Higginbotham MD

## 2024-03-04 NOTE — TELEPHONE ENCOUNTER
RN called and left the patient a message to call the office back. MD Higginbotham was unable to add on labs to labs the patient had drawn on 2/26 for iron and ferritin. He would like the patient to go to the lab sometime this week to get those labs drawn.

## 2024-03-06 RX ORDER — DIPHENHYDRAMINE HYDROCHLORIDE 50 MG/ML
50 INJECTION INTRAMUSCULAR; INTRAVENOUS AS NEEDED
Status: CANCELLED | OUTPATIENT
Start: 2024-03-11

## 2024-03-06 RX ORDER — EPINEPHRINE 0.3 MG/.3ML
0.3 INJECTION SUBCUTANEOUS EVERY 5 MIN PRN
Status: CANCELLED | OUTPATIENT
Start: 2024-03-11

## 2024-03-06 RX ORDER — ALBUTEROL SULFATE 0.83 MG/ML
3 SOLUTION RESPIRATORY (INHALATION) AS NEEDED
Status: CANCELLED | OUTPATIENT
Start: 2024-03-11

## 2024-03-06 RX ORDER — FAMOTIDINE 10 MG/ML
20 INJECTION INTRAVENOUS ONCE AS NEEDED
Status: CANCELLED | OUTPATIENT
Start: 2024-03-11

## 2024-03-08 ENCOUNTER — TELEPHONE (OUTPATIENT)
Dept: HEMATOLOGY/ONCOLOGY | Facility: CLINIC | Age: 74
End: 2024-03-08
Payer: MEDICARE

## 2024-03-19 ENCOUNTER — TELEPHONE (OUTPATIENT)
Dept: HEMATOLOGY/ONCOLOGY | Facility: HOSPITAL | Age: 74
End: 2024-03-19
Payer: MEDICARE

## 2024-03-19 NOTE — TELEPHONE ENCOUNTER
Pt called for clarification of what 3/19 infusion appointment is for.  Discussed that Dr. Higginbotham scheduled her for phlebotomy and pt is agreeable.  She has never had the procedure before so suggested she have a  for the appt.

## 2024-03-25 DIAGNOSIS — I10 PRIMARY HYPERTENSION: ICD-10-CM

## 2024-03-25 RX ORDER — OLMESARTAN MEDOXOMIL 5 MG/1
5 TABLET ORAL DAILY
Qty: 90 TABLET | Refills: 0 | Status: SHIPPED | OUTPATIENT
Start: 2024-03-25 | End: 2024-04-23 | Stop reason: SDUPTHER

## 2024-03-29 ENCOUNTER — APPOINTMENT (OUTPATIENT)
Dept: HEMATOLOGY/ONCOLOGY | Facility: HOSPITAL | Age: 74
End: 2024-03-29
Payer: MEDICARE

## 2024-03-29 ENCOUNTER — INFUSION (OUTPATIENT)
Dept: HEMATOLOGY/ONCOLOGY | Facility: CLINIC | Age: 74
End: 2024-03-29
Payer: MEDICARE

## 2024-03-29 VITALS
OXYGEN SATURATION: 95 % | SYSTOLIC BLOOD PRESSURE: 155 MMHG | RESPIRATION RATE: 18 BRPM | WEIGHT: 269.73 LBS | DIASTOLIC BLOOD PRESSURE: 71 MMHG | BODY MASS INDEX: 40.13 KG/M2 | TEMPERATURE: 97.2 F | HEART RATE: 59 BPM

## 2024-03-29 DIAGNOSIS — E61.1 IRON DEFICIENCY: ICD-10-CM

## 2024-03-29 DIAGNOSIS — D45 JAK2 POSITIVE POLYCYTHEMIA VERA (MULTI): ICD-10-CM

## 2024-03-29 LAB
BASOPHILS # BLD AUTO: 0.03 X10*3/UL (ref 0–0.1)
BASOPHILS NFR BLD AUTO: 0.5 %
EOSINOPHIL # BLD AUTO: 0.08 X10*3/UL (ref 0–0.4)
EOSINOPHIL NFR BLD AUTO: 1.4 %
ERYTHROCYTE [DISTWIDTH] IN BLOOD BY AUTOMATED COUNT: 14.2 % (ref 11.5–14.5)
FERRITIN SERPL-MCNC: 63 NG/ML (ref 8–150)
HCT VFR BLD AUTO: 43.2 % (ref 36–46)
HGB BLD-MCNC: 14.7 G/DL (ref 12–16)
IMM GRANULOCYTES # BLD AUTO: 0.01 X10*3/UL (ref 0–0.5)
IMM GRANULOCYTES NFR BLD AUTO: 0.2 % (ref 0–0.9)
IRON SATN MFR SERPL: 46 % (ref 25–45)
IRON SERPL-MCNC: 136 UG/DL (ref 35–150)
LYMPHOCYTES # BLD AUTO: 1.71 X10*3/UL (ref 0.8–3)
LYMPHOCYTES NFR BLD AUTO: 30.9 %
MCH RBC QN AUTO: 33.2 PG (ref 26–34)
MCHC RBC AUTO-ENTMCNC: 34 G/DL (ref 32–36)
MCV RBC AUTO: 98 FL (ref 80–100)
MONOCYTES # BLD AUTO: 0.47 X10*3/UL (ref 0.05–0.8)
MONOCYTES NFR BLD AUTO: 8.5 %
NEUTROPHILS # BLD AUTO: 3.24 X10*3/UL (ref 1.6–5.5)
NEUTROPHILS NFR BLD AUTO: 58.5 %
NRBC BLD-RTO: NORMAL /100{WBCS}
PLATELET # BLD AUTO: 281 X10*3/UL (ref 150–450)
RBC # BLD AUTO: 4.43 X10*6/UL (ref 4–5.2)
TIBC SERPL-MCNC: 294 UG/DL (ref 240–445)
UIBC SERPL-MCNC: 158 UG/DL (ref 110–370)
WBC # BLD AUTO: 5.5 X10*3/UL (ref 4.4–11.3)

## 2024-03-29 PROCEDURE — 36415 COLL VENOUS BLD VENIPUNCTURE: CPT

## 2024-03-29 PROCEDURE — 82728 ASSAY OF FERRITIN: CPT

## 2024-03-29 PROCEDURE — 83540 ASSAY OF IRON: CPT

## 2024-03-29 PROCEDURE — 85025 COMPLETE CBC W/AUTO DIFF WBC: CPT

## 2024-03-29 RX ORDER — DIPHENHYDRAMINE HYDROCHLORIDE 50 MG/ML
50 INJECTION INTRAMUSCULAR; INTRAVENOUS AS NEEDED
OUTPATIENT
Start: 2024-03-29

## 2024-03-29 RX ORDER — EPINEPHRINE 0.3 MG/.3ML
0.3 INJECTION SUBCUTANEOUS EVERY 5 MIN PRN
OUTPATIENT
Start: 2024-03-29

## 2024-03-29 RX ORDER — ALBUTEROL SULFATE 0.83 MG/ML
3 SOLUTION RESPIRATORY (INHALATION) AS NEEDED
OUTPATIENT
Start: 2024-03-29

## 2024-03-29 RX ORDER — FAMOTIDINE 10 MG/ML
20 INJECTION INTRAVENOUS ONCE AS NEEDED
OUTPATIENT
Start: 2024-03-29

## 2024-03-29 ASSESSMENT — PAIN SCALES - GENERAL: PAINLEVEL: 4

## 2024-04-02 ENCOUNTER — HOSPITAL ENCOUNTER (OUTPATIENT)
Dept: RADIOLOGY | Facility: CLINIC | Age: 74
Discharge: HOME | End: 2024-04-02
Payer: MEDICARE

## 2024-04-02 ENCOUNTER — OFFICE VISIT (OUTPATIENT)
Dept: ORTHOPEDIC SURGERY | Facility: CLINIC | Age: 74
End: 2024-04-02
Payer: MEDICARE

## 2024-04-02 VITALS — BODY MASS INDEX: 39.84 KG/M2 | HEIGHT: 69 IN | WEIGHT: 269 LBS

## 2024-04-02 DIAGNOSIS — M25.551 PAIN OF RIGHT HIP JOINT: ICD-10-CM

## 2024-04-02 DIAGNOSIS — M25.562 LEFT KNEE PAIN, UNSPECIFIED CHRONICITY: ICD-10-CM

## 2024-04-02 DIAGNOSIS — M17.12 ARTHRITIS OF LEFT KNEE: ICD-10-CM

## 2024-04-02 PROCEDURE — 1125F AMNT PAIN NOTED PAIN PRSNT: CPT | Performed by: ORTHOPAEDIC SURGERY

## 2024-04-02 PROCEDURE — 73562 X-RAY EXAM OF KNEE 3: CPT | Mod: LEFT SIDE | Performed by: RADIOLOGY

## 2024-04-02 PROCEDURE — 73562 X-RAY EXAM OF KNEE 3: CPT | Mod: LT

## 2024-04-02 PROCEDURE — 73502 X-RAY EXAM HIP UNI 2-3 VIEWS: CPT | Mod: RT

## 2024-04-02 PROCEDURE — 99214 OFFICE O/P EST MOD 30 MIN: CPT | Performed by: ORTHOPAEDIC SURGERY

## 2024-04-02 PROCEDURE — 1159F MED LIST DOCD IN RCRD: CPT | Performed by: ORTHOPAEDIC SURGERY

## 2024-04-02 PROCEDURE — 1160F RVW MEDS BY RX/DR IN RCRD: CPT | Performed by: ORTHOPAEDIC SURGERY

## 2024-04-02 PROCEDURE — 73502 X-RAY EXAM HIP UNI 2-3 VIEWS: CPT | Mod: RIGHT SIDE | Performed by: RADIOLOGY

## 2024-04-02 PROCEDURE — 1036F TOBACCO NON-USER: CPT | Performed by: ORTHOPAEDIC SURGERY

## 2024-04-02 RX ORDER — HYALURONATE SODIUM 30 MG/2 ML
30 SYRINGE (ML) INTRAARTICULAR
Qty: 8 ML | Refills: 0 | Status: SHIPPED | OUTPATIENT
Start: 2024-04-02 | End: 2024-04-30

## 2024-04-02 ASSESSMENT — ENCOUNTER SYMPTOMS
OCCASIONAL FEELINGS OF UNSTEADINESS: 0
DEPRESSION: 0
LOSS OF SENSATION IN FEET: 0

## 2024-04-02 ASSESSMENT — PATIENT HEALTH QUESTIONNAIRE - PHQ9
2. FEELING DOWN, DEPRESSED OR HOPELESS: NOT AT ALL
SUM OF ALL RESPONSES TO PHQ9 QUESTIONS 1 AND 2: 0
1. LITTLE INTEREST OR PLEASURE IN DOING THINGS: NOT AT ALL

## 2024-04-02 ASSESSMENT — PAIN SCALES - GENERAL: PAINLEVEL: 5

## 2024-04-02 NOTE — PROGRESS NOTES
Presents with continuing right hip pain hurts in the anterior aspect of the hip and actually hurts when she is sitting more than when she is walking  She has been through extensive physical therapy without resolution her hip replacement was done in September of last year  She also has some chronic left knee pain which has been treated with gel injections in the past is failed steroid injections she has known arthritis of the knee she wants to repeat the gel injections  Past medical,family and social histories have been reviewed and are up to date.  All other body systems have been reviewed and are negative for complaint.  Constitutional: Well-developed well-nourished   Eyes: Sclerae anicteric, pupils equal and round  HENT: Normocephalic atraumatic  Cardiovascular: Pulses full, regular rate and rhythm  Respiratory: Breathing not labored, no wheezing  Integumentary: Skin intact, no lesions or rashes  Neurological: Sensation intact, no gross strength deficits, reflexes equal  Psychiatric: Alert oriented and appropriate  Hematologic/lymphatic: No lymphadenopathy  She is tender along the anterior aspect of the hip but she does not have much pain with resisted hip flexion nor rotation's fairly normal gait  Left knee has a small effusion generalized joint line tenderness and crepitus  Impression psoas tendinitis?  Will try some massage therapy hopefully a further time will stretch things out and will be as uncomfortable  Consider injection  With regard to her knee we will apply for gel injection

## 2024-04-15 ENCOUNTER — TELEPHONE (OUTPATIENT)
Dept: HEMATOLOGY/ONCOLOGY | Facility: CLINIC | Age: 74
End: 2024-04-15

## 2024-04-15 DIAGNOSIS — D45 JAK2 POSITIVE POLYCYTHEMIA VERA (MULTI): Primary | ICD-10-CM

## 2024-04-16 NOTE — TELEPHONE ENCOUNTER
RN called and left a message on the patient's identified voicemail that MD Higginbotham would like her to get labs every 3 months so should be ok to get before next FUV in June. RN advised to call the office back if she has questions.

## 2024-04-23 ENCOUNTER — OFFICE VISIT (OUTPATIENT)
Dept: PRIMARY CARE | Facility: CLINIC | Age: 74
End: 2024-04-23
Payer: MEDICARE

## 2024-04-23 ENCOUNTER — LAB (OUTPATIENT)
Dept: LAB | Facility: LAB | Age: 74
End: 2024-04-23
Payer: MEDICARE

## 2024-04-23 VITALS
BODY MASS INDEX: 40.77 KG/M2 | WEIGHT: 269 LBS | SYSTOLIC BLOOD PRESSURE: 142 MMHG | HEIGHT: 68 IN | DIASTOLIC BLOOD PRESSURE: 74 MMHG

## 2024-04-23 DIAGNOSIS — E03.9 HYPOTHYROIDISM, UNSPECIFIED TYPE: ICD-10-CM

## 2024-04-23 DIAGNOSIS — E78.2 MIXED HYPERLIPIDEMIA: ICD-10-CM

## 2024-04-23 DIAGNOSIS — Z00.00 MEDICARE ANNUAL WELLNESS VISIT, SUBSEQUENT: ICD-10-CM

## 2024-04-23 DIAGNOSIS — H93.11 TINNITUS OF RIGHT EAR: ICD-10-CM

## 2024-04-23 DIAGNOSIS — Z00.00 ENCOUNTER FOR PREVENTIVE HEALTH EXAMINATION: ICD-10-CM

## 2024-04-23 DIAGNOSIS — I10 PRIMARY HYPERTENSION: ICD-10-CM

## 2024-04-23 DIAGNOSIS — Z00.00 ROUTINE GENERAL MEDICAL EXAMINATION AT HEALTH CARE FACILITY: Primary | ICD-10-CM

## 2024-04-23 DIAGNOSIS — Z13.220 LIPID SCREENING: ICD-10-CM

## 2024-04-23 DIAGNOSIS — N32.81 OAB (OVERACTIVE BLADDER): ICD-10-CM

## 2024-04-23 LAB
ALBUMIN SERPL BCP-MCNC: 4.3 G/DL (ref 3.4–5)
ALP SERPL-CCNC: 100 U/L (ref 33–136)
ALT SERPL W P-5'-P-CCNC: 15 U/L (ref 7–45)
ANION GAP SERPL CALC-SCNC: 14 MMOL/L (ref 10–20)
AST SERPL W P-5'-P-CCNC: 19 U/L (ref 9–39)
BILIRUB SERPL-MCNC: 0.8 MG/DL (ref 0–1.2)
BUN SERPL-MCNC: 18 MG/DL (ref 6–23)
CALCIUM SERPL-MCNC: 10.1 MG/DL (ref 8.6–10.6)
CHLORIDE SERPL-SCNC: 105 MMOL/L (ref 98–107)
CHOLEST SERPL-MCNC: 172 MG/DL (ref 0–199)
CHOLESTEROL/HDL RATIO: 2.9
CO2 SERPL-SCNC: 28 MMOL/L (ref 21–32)
CREAT SERPL-MCNC: 0.84 MG/DL (ref 0.5–1.05)
EGFRCR SERPLBLD CKD-EPI 2021: 73 ML/MIN/1.73M*2
GLUCOSE SERPL-MCNC: 92 MG/DL (ref 74–99)
HDLC SERPL-MCNC: 60.2 MG/DL
LDLC SERPL CALC-MCNC: 82 MG/DL
NON HDL CHOLESTEROL: 112 MG/DL (ref 0–149)
POTASSIUM SERPL-SCNC: 5.3 MMOL/L (ref 3.5–5.3)
PROT SERPL-MCNC: 6.9 G/DL (ref 6.4–8.2)
SODIUM SERPL-SCNC: 142 MMOL/L (ref 136–145)
TRIGL SERPL-MCNC: 150 MG/DL (ref 0–149)
TSH SERPL-ACNC: 0.12 MIU/L (ref 0.44–3.98)
VLDL: 30 MG/DL (ref 0–40)

## 2024-04-23 PROCEDURE — 1159F MED LIST DOCD IN RCRD: CPT | Performed by: STUDENT IN AN ORGANIZED HEALTH CARE EDUCATION/TRAINING PROGRAM

## 2024-04-23 PROCEDURE — 80061 LIPID PANEL: CPT

## 2024-04-23 PROCEDURE — 84443 ASSAY THYROID STIM HORMONE: CPT

## 2024-04-23 PROCEDURE — 80053 COMPREHEN METABOLIC PANEL: CPT

## 2024-04-23 PROCEDURE — 36415 COLL VENOUS BLD VENIPUNCTURE: CPT

## 2024-04-23 PROCEDURE — 3077F SYST BP >= 140 MM HG: CPT | Performed by: STUDENT IN AN ORGANIZED HEALTH CARE EDUCATION/TRAINING PROGRAM

## 2024-04-23 PROCEDURE — 1160F RVW MEDS BY RX/DR IN RCRD: CPT | Performed by: STUDENT IN AN ORGANIZED HEALTH CARE EDUCATION/TRAINING PROGRAM

## 2024-04-23 PROCEDURE — G0439 PPPS, SUBSEQ VISIT: HCPCS | Performed by: STUDENT IN AN ORGANIZED HEALTH CARE EDUCATION/TRAINING PROGRAM

## 2024-04-23 PROCEDURE — 99214 OFFICE O/P EST MOD 30 MIN: CPT | Performed by: STUDENT IN AN ORGANIZED HEALTH CARE EDUCATION/TRAINING PROGRAM

## 2024-04-23 PROCEDURE — 1170F FXNL STATUS ASSESSED: CPT | Performed by: STUDENT IN AN ORGANIZED HEALTH CARE EDUCATION/TRAINING PROGRAM

## 2024-04-23 PROCEDURE — 99397 PER PM REEVAL EST PAT 65+ YR: CPT | Performed by: STUDENT IN AN ORGANIZED HEALTH CARE EDUCATION/TRAINING PROGRAM

## 2024-04-23 PROCEDURE — 3078F DIAST BP <80 MM HG: CPT | Performed by: STUDENT IN AN ORGANIZED HEALTH CARE EDUCATION/TRAINING PROGRAM

## 2024-04-23 RX ORDER — METOPROLOL SUCCINATE 50 MG/1
50 TABLET, EXTENDED RELEASE ORAL DAILY
Qty: 90 TABLET | Refills: 1 | Status: SHIPPED | OUTPATIENT
Start: 2024-04-23 | End: 2025-04-23

## 2024-04-23 RX ORDER — LEVOTHYROXINE SODIUM 175 UG/1
175 TABLET ORAL DAILY
Qty: 90 TABLET | Refills: 1 | Status: SHIPPED | OUTPATIENT
Start: 2024-04-23 | End: 2024-04-25 | Stop reason: ALTCHOICE

## 2024-04-23 RX ORDER — OLMESARTAN MEDOXOMIL 5 MG/1
10 TABLET ORAL DAILY
Qty: 180 TABLET | Refills: 1 | Status: SHIPPED | OUTPATIENT
Start: 2024-04-23 | End: 2024-10-20

## 2024-04-23 RX ORDER — OXYBUTYNIN CHLORIDE 10 MG/1
10 TABLET, EXTENDED RELEASE ORAL DAILY
Qty: 90 TABLET | Refills: 1 | Status: SHIPPED | OUTPATIENT
Start: 2024-04-23

## 2024-04-23 RX ORDER — SIMVASTATIN 20 MG/1
20 TABLET, FILM COATED ORAL DAILY
Qty: 90 TABLET | Refills: 1 | Status: SHIPPED | OUTPATIENT
Start: 2024-04-23

## 2024-04-23 ASSESSMENT — ENCOUNTER SYMPTOMS
OCCASIONAL FEELINGS OF UNSTEADINESS: 0
LOSS OF SENSATION IN FEET: 0
DEPRESSION: 0

## 2024-04-23 ASSESSMENT — ACTIVITIES OF DAILY LIVING (ADL)
DRESSING: INDEPENDENT
DOING_HOUSEWORK: NEEDS ASSISTANCE
MANAGING_FINANCES: INDEPENDENT
BATHING: INDEPENDENT
TAKING_MEDICATION: INDEPENDENT
GROCERY_SHOPPING: INDEPENDENT

## 2024-04-23 NOTE — PROGRESS NOTES
Subjective   Patient ID: Ileana Glover is a 74 y.o. female who presents for Medicare Annual Wellness Visit Subsequent (Several issues to discuss/Med refill).    [facility]   Routine fu, yearly physical, and wellness exam.    She has been exercising regularly, seeing orthopedics after right NOEMI. She complains of left lateral knee pain.    Intermittent left shoulder pain after left TSA last year.    BP has been intermittently elevated.    She complains of tinnitus for at least a year. No HA or change in vision. No ear pain.    Several questions today.      Review of Systems  12-point ROS reviewed and was negative unless otherwise noted in HPI.    Objective   There were no vitals taken for this visit.    Physical Exam  GEN: conversant, NAD  HEENT: PERRL, EOMI, MMM  NECK: supple  CV: S1, S2, RRR  PULM: CTAB  ABD: soft, NT, obese  NEURO: no new gross focal deficits  EXT: no sig LE edema  PSYCH: appropriate affect    Assessment/Plan     #Hypertension: increase olmesartan to 10mg daily. Monitor metabolic panel. Continue amlodipine.    #Tinnitus: right TM clear. Offered referral to ENT, she declines for now.     #Hypothyroidism: Maintained on levothyroxine to 175mcg daily      #Polycythemia vera/thrombocytosis: Seeing hematology. Continue hydroxyurea, ASA 81 mg daily.     #Hyperlipidemia: Continue statin.      #Obesity, severe, class 3: Continue lifestyle modifications, swimming, weight watchers.     #Hip/Knee/shoulder OA: following with Juan Nelson and [facility]. Had shoulder surgery with Dr. Sotelo in 7/2022. Had right NOEMI 9/2023     #Skin lesions: seeing dermatology      #Health maintenance:   Follows with Ophthalmology, Dentist regularly.  Follows with gynecology, who orders mammograms regularly.   DEXA performed 2022  Tetanus vaccine in 2013.   Pneumovax 2015. Prevnar 20 recommended  Colonoscopy performed 5/2023, 3 year fu advised. Received Shingrix and COVID-19 vaccine. Advised yearly flu shot.  Multiple questions  addressed.     RTC 3-4 mo

## 2024-04-25 DIAGNOSIS — E03.9 HYPOTHYROIDISM, UNSPECIFIED TYPE: ICD-10-CM

## 2024-04-25 RX ORDER — LEVOTHYROXINE SODIUM 150 UG/1
150 TABLET ORAL
Qty: 90 TABLET | Refills: 1 | Status: SHIPPED | OUTPATIENT
Start: 2024-04-25 | End: 2024-10-22

## 2024-04-29 ENCOUNTER — TELEPHONE (OUTPATIENT)
Dept: ORTHOPEDIC SURGERY | Facility: CLINIC | Age: 74
End: 2024-04-29
Payer: MEDICARE

## 2024-05-03 ENCOUNTER — ALLIED HEALTH (OUTPATIENT)
Dept: INTEGRATIVE MEDICINE | Facility: CLINIC | Age: 74
End: 2024-05-03

## 2024-05-03 PROCEDURE — MASS60G MASSAGE 60 MINUTES

## 2024-05-03 NOTE — PROGRESS NOTES
Massage Therapy Visit:     Ileana Glover     Condition of Client Subjective :  Patient ID: Ileana Glover is a 74 y.o. female who presents for reason for visit of   R hip replacement   Discomfort and ROM restriction in R posterior hip  Bilateral posterior hip tension  Bilateral neck and shoulder tension   Retired teacher     50 min. Medium-moderate pressure massage,  palpations knuckle strokes stripping pin/stretch cross fiber frictions and ischemic pressure throughout upper traps levator scapulas scalenes omohyoids sternocleidomastoids supraspinatus  occipital attachments and splenis capitis/cervicis,  palpations knuckle strokes stripping effleurage pin/stretch and cross fiber frictions throughout quadriceps IT band TFL calves hamstrings, pin/stretch knuckle strokes throughout glutes deep six lateral rotators and hip attachments, petrissage stripping knuckle strokes cross fiber frictions and compressions throughout trapezius rhomboids erector spinae infraspinatus quadratus lumborum latissimus dorsi obliques        Session Information  Visit Type: Follow-up visit  Description of present complaint: Stress, Discomfort, Muscle tension, Range of motion (ROM), Scar tissue, Chronic pain, Gait issues    Before providing massage therapy, I discussed the provision of massage therapy services and any pertinent issues related to the patient's status with the patient's nurse. I implemented fall prevention measures including handrails, nonskid socks, and having a call light within reach. Following massage therapy, I provided a report to the patient's nurse.    Review of Systems    Objective   Pre-treatment Assessment  Arrival Mode: Ambulatory  Treatment Precautions: None    Physical Exam    Actions Assessment/Plan :  Provider reviewed plan for the massage session, precautions and contraindications. Patient/guardian/hospital staff has given consent to treat with full understanding of what to expect during the session. Before  massage therapy began, provider explained to the patient to communicate at any time if the pressure was causing discomfort past their tolerance level. Patient agreed to advise therapist.    Massage Treatment  Denies Allergy: Patient denies allergy to topical lubricant  Patient Position: Supine, Table, Prone  Positioning Assistance: Did not need assistance, Pillow(s)/bolster under knees while supine, Pillow(s)/bolster under anles while prone  Pressure Scale: 3 - Medium pressure, 4 - Moderate pressure    Response:  Post-treatment Assessment  Patient Fell Asleep During Treatment: No  Patient Noted Improvement of the Following Symptoms: Muscle tension, ROM    Evaluation:    Ileana lGover tolerated today's session with no concerns or additional discomfort. We spoke about post massage water intake and future follow ups.

## 2024-05-16 ENCOUNTER — OFFICE VISIT (OUTPATIENT)
Dept: ORTHOPEDIC SURGERY | Facility: CLINIC | Age: 74
End: 2024-05-16
Payer: MEDICARE

## 2024-05-16 VITALS — BODY MASS INDEX: 40.77 KG/M2 | HEIGHT: 68 IN | WEIGHT: 269 LBS

## 2024-05-16 DIAGNOSIS — M17.0 ARTHRITIS OF BOTH KNEES: Primary | ICD-10-CM

## 2024-05-16 PROCEDURE — 1036F TOBACCO NON-USER: CPT | Performed by: ORTHOPAEDIC SURGERY

## 2024-05-16 PROCEDURE — 1125F AMNT PAIN NOTED PAIN PRSNT: CPT | Performed by: ORTHOPAEDIC SURGERY

## 2024-05-16 PROCEDURE — 1160F RVW MEDS BY RX/DR IN RCRD: CPT | Performed by: ORTHOPAEDIC SURGERY

## 2024-05-16 PROCEDURE — 1159F MED LIST DOCD IN RCRD: CPT | Performed by: ORTHOPAEDIC SURGERY

## 2024-05-16 PROCEDURE — 20610 DRAIN/INJ JOINT/BURSA W/O US: CPT | Performed by: ORTHOPAEDIC SURGERY

## 2024-05-16 ASSESSMENT — PAIN - FUNCTIONAL ASSESSMENT: PAIN_FUNCTIONAL_ASSESSMENT: 0-10

## 2024-05-16 ASSESSMENT — PAIN SCALES - GENERAL: PAINLEVEL_OUTOF10: 8

## 2024-05-16 ASSESSMENT — PAIN DESCRIPTION - DESCRIPTORS: DESCRIPTORS: BURNING;ACHING

## 2024-05-16 NOTE — PROGRESS NOTES
L Inj/Asp: bilateral knee on 5/16/2024 2:48 PM  Indications: pain  Details: 21 G needle, lateral approach  Medications (Right): 30 mg hyaluronan 30 mg/2 mL  Medications (Left): 30 mg hyaluronan 30 mg/2 mL

## 2024-05-18 DIAGNOSIS — M76.891 TENDONITIS OF RIGHT HIP: ICD-10-CM

## 2024-05-18 DIAGNOSIS — M25.551 RIGHT HIP PAIN: ICD-10-CM

## 2024-05-20 RX ORDER — MELOXICAM 15 MG/1
15 TABLET ORAL DAILY
Qty: 30 TABLET | Refills: 0 | Status: SHIPPED | OUTPATIENT
Start: 2024-05-20

## 2024-05-22 ENCOUNTER — HOSPITAL ENCOUNTER (OUTPATIENT)
Dept: RADIOLOGY | Facility: CLINIC | Age: 74
Discharge: HOME | End: 2024-05-22
Payer: MEDICARE

## 2024-05-22 ENCOUNTER — OFFICE VISIT (OUTPATIENT)
Dept: ORTHOPEDIC SURGERY | Facility: CLINIC | Age: 74
End: 2024-05-22
Payer: MEDICARE

## 2024-05-22 DIAGNOSIS — M79.672 LEFT FOOT PAIN: ICD-10-CM

## 2024-05-22 DIAGNOSIS — M19.072 ARTHRITIS OF LEFT MIDFOOT: Primary | ICD-10-CM

## 2024-05-22 PROCEDURE — 1125F AMNT PAIN NOTED PAIN PRSNT: CPT | Performed by: ORTHOPAEDIC SURGERY

## 2024-05-22 PROCEDURE — 73630 X-RAY EXAM OF FOOT: CPT | Mod: LEFT SIDE | Performed by: STUDENT IN AN ORGANIZED HEALTH CARE EDUCATION/TRAINING PROGRAM

## 2024-05-22 PROCEDURE — 1159F MED LIST DOCD IN RCRD: CPT | Performed by: ORTHOPAEDIC SURGERY

## 2024-05-22 PROCEDURE — 1160F RVW MEDS BY RX/DR IN RCRD: CPT | Performed by: ORTHOPAEDIC SURGERY

## 2024-05-22 PROCEDURE — 99214 OFFICE O/P EST MOD 30 MIN: CPT | Performed by: ORTHOPAEDIC SURGERY

## 2024-05-22 PROCEDURE — 73630 X-RAY EXAM OF FOOT: CPT | Mod: LT

## 2024-05-22 RX ORDER — DICLOFENAC SODIUM 10 MG/G
4 GEL TOPICAL 4 TIMES DAILY PRN
Qty: 4 G | Refills: 2 | Status: SHIPPED | OUTPATIENT
Start: 2024-05-22

## 2024-05-22 ASSESSMENT — PAIN SCALES - GENERAL: PAINLEVEL_OUTOF10: 3

## 2024-05-22 ASSESSMENT — PAIN - FUNCTIONAL ASSESSMENT: PAIN_FUNCTIONAL_ASSESSMENT: 0-10

## 2024-05-22 ASSESSMENT — PAIN DESCRIPTION - DESCRIPTORS: DESCRIPTORS: SHARP;ACHING;BURNING

## 2024-05-22 NOTE — PROGRESS NOTES
74-year-old female here for left foot pain.  Developed pain that radiates from the top of the left foot down towards the toes.  Denies any recent injury.  Gets some tingling down towards the toes.    On exam:  WD/WN overweight female  A+O X3  NAD  No lymphedema  Inspection of both feet and ankles show symmetric arches.   Palpable spurs dorsal midfoot.  Mild swelling.  5/5 strength in all 4 planes.   Sensation intact to LT.   Good pulses.   Stable anterior drawer.  No peroneal subluxation.    (-) Tasneem.     I personally reviewed the following radiographic exams: Left foot shows mild osteopenia.  Mild midfoot arthritis.  No acute changes.    Assessment: Left midfoot arthritis.    Plan: Discussed nonoperative and operative options in detail.   Risk and benefits discussed in detail. All questions answered today.  Recovery timeline and expectations discussed in detail.  Discussed supportive shoe wear.  Discussed topical anti-inflammatories like Voltaren gel.  Discussed possible injections.  Follow-up if pain continues.

## 2024-05-23 ENCOUNTER — OFFICE VISIT (OUTPATIENT)
Dept: ORTHOPEDIC SURGERY | Facility: CLINIC | Age: 74
End: 2024-05-23
Payer: MEDICARE

## 2024-05-23 VITALS — HEIGHT: 68 IN | WEIGHT: 269 LBS | BODY MASS INDEX: 40.77 KG/M2

## 2024-05-23 DIAGNOSIS — M17.0 ARTHRITIS OF BOTH KNEES: Primary | ICD-10-CM

## 2024-05-23 PROCEDURE — 1160F RVW MEDS BY RX/DR IN RCRD: CPT | Performed by: ORTHOPAEDIC SURGERY

## 2024-05-23 PROCEDURE — 1125F AMNT PAIN NOTED PAIN PRSNT: CPT | Performed by: ORTHOPAEDIC SURGERY

## 2024-05-23 PROCEDURE — 20610 DRAIN/INJ JOINT/BURSA W/O US: CPT | Performed by: ORTHOPAEDIC SURGERY

## 2024-05-23 PROCEDURE — 1036F TOBACCO NON-USER: CPT | Performed by: ORTHOPAEDIC SURGERY

## 2024-05-23 PROCEDURE — 1159F MED LIST DOCD IN RCRD: CPT | Performed by: ORTHOPAEDIC SURGERY

## 2024-05-23 ASSESSMENT — PAIN - FUNCTIONAL ASSESSMENT: PAIN_FUNCTIONAL_ASSESSMENT: 0-10

## 2024-05-23 ASSESSMENT — PAIN SCALES - GENERAL: PAINLEVEL_OUTOF10: 6

## 2024-05-23 ASSESSMENT — PAIN DESCRIPTION - DESCRIPTORS: DESCRIPTORS: ACHING

## 2024-05-23 NOTE — PROGRESS NOTES
L Inj/Asp: bilateral knee on 5/23/2024 8:45 AM  Indications: pain  Details: 21 G needle, lateral approach  Medications (Right): 20 mg sodium hyaluronate 10 mg/mL  Medications (Left): 20 mg sodium hyaluronate 10 mg/mL

## 2024-05-30 ENCOUNTER — OFFICE VISIT (OUTPATIENT)
Dept: ORTHOPEDIC SURGERY | Facility: CLINIC | Age: 74
End: 2024-05-30
Payer: MEDICARE

## 2024-05-30 VITALS — BODY MASS INDEX: 40.77 KG/M2 | HEIGHT: 68 IN | WEIGHT: 269 LBS

## 2024-05-30 DIAGNOSIS — M17.0 ARTHRITIS OF BOTH KNEES: Primary | ICD-10-CM

## 2024-05-30 PROCEDURE — 20610 DRAIN/INJ JOINT/BURSA W/O US: CPT | Performed by: ORTHOPAEDIC SURGERY

## 2024-05-30 PROCEDURE — 1036F TOBACCO NON-USER: CPT | Performed by: ORTHOPAEDIC SURGERY

## 2024-05-30 PROCEDURE — 20610 DRAIN/INJ JOINT/BURSA W/O US: CPT | Mod: 50 | Performed by: ORTHOPAEDIC SURGERY

## 2024-05-30 PROCEDURE — 1160F RVW MEDS BY RX/DR IN RCRD: CPT | Performed by: ORTHOPAEDIC SURGERY

## 2024-05-30 PROCEDURE — 2500000004 HC RX 250 GENERAL PHARMACY W/ HCPCS (ALT 636 FOR OP/ED): Mod: JZ | Performed by: ORTHOPAEDIC SURGERY

## 2024-05-30 PROCEDURE — 1159F MED LIST DOCD IN RCRD: CPT | Performed by: ORTHOPAEDIC SURGERY

## 2024-05-30 RX ADMIN — Medication 30 MG: at 10:49

## 2024-05-30 ASSESSMENT — PAIN - FUNCTIONAL ASSESSMENT: PAIN_FUNCTIONAL_ASSESSMENT: NO/DENIES PAIN

## 2024-05-30 NOTE — PROGRESS NOTES
L Inj/Asp: bilateral knee on 5/30/2024 10:49 AM  Indications: pain  Details: 21 G needle, lateral approach  Medications (Right): 30 mg hyaluronan 30 mg/2 mL  Medications (Left): 30 mg hyaluronan 30 mg/2 mL

## 2024-06-03 ENCOUNTER — LAB (OUTPATIENT)
Dept: LAB | Facility: LAB | Age: 74
End: 2024-06-03
Payer: MEDICARE

## 2024-06-03 DIAGNOSIS — D45 JAK2 POSITIVE POLYCYTHEMIA VERA (MULTI): ICD-10-CM

## 2024-06-03 LAB
ALBUMIN SERPL BCP-MCNC: 4 G/DL (ref 3.4–5)
ALP SERPL-CCNC: 93 U/L (ref 33–136)
ALT SERPL W P-5'-P-CCNC: 21 U/L (ref 7–45)
ANION GAP SERPL CALC-SCNC: 11 MMOL/L (ref 10–20)
AST SERPL W P-5'-P-CCNC: 17 U/L (ref 9–39)
BASOPHILS # BLD AUTO: 0.04 X10*3/UL (ref 0–0.1)
BASOPHILS NFR BLD AUTO: 0.4 %
BILIRUB SERPL-MCNC: 0.7 MG/DL (ref 0–1.2)
BUN SERPL-MCNC: 18 MG/DL (ref 6–23)
CALCIUM SERPL-MCNC: 9.5 MG/DL (ref 8.6–10.6)
CHLORIDE SERPL-SCNC: 104 MMOL/L (ref 98–107)
CO2 SERPL-SCNC: 29 MMOL/L (ref 21–32)
CREAT SERPL-MCNC: 0.73 MG/DL (ref 0.5–1.05)
EGFRCR SERPLBLD CKD-EPI 2021: 86 ML/MIN/1.73M*2
EOSINOPHIL # BLD AUTO: 0.15 X10*3/UL (ref 0–0.4)
EOSINOPHIL NFR BLD AUTO: 1.7 %
ERYTHROCYTE [DISTWIDTH] IN BLOOD BY AUTOMATED COUNT: 14.7 % (ref 11.5–14.5)
GLUCOSE SERPL-MCNC: 90 MG/DL (ref 74–99)
HCT VFR BLD AUTO: 46.1 % (ref 36–46)
HGB BLD-MCNC: 15.4 G/DL (ref 12–16)
IMM GRANULOCYTES # BLD AUTO: 0.06 X10*3/UL (ref 0–0.5)
IMM GRANULOCYTES NFR BLD AUTO: 0.7 % (ref 0–0.9)
LYMPHOCYTES # BLD AUTO: 2.64 X10*3/UL (ref 0.8–3)
LYMPHOCYTES NFR BLD AUTO: 29.3 %
MCH RBC QN AUTO: 34.2 PG (ref 26–34)
MCHC RBC AUTO-ENTMCNC: 33.4 G/DL (ref 32–36)
MCV RBC AUTO: 102 FL (ref 80–100)
MONOCYTES # BLD AUTO: 0.63 X10*3/UL (ref 0.05–0.8)
MONOCYTES NFR BLD AUTO: 7 %
NEUTROPHILS # BLD AUTO: 5.49 X10*3/UL (ref 1.6–5.5)
NEUTROPHILS NFR BLD AUTO: 60.9 %
NRBC BLD-RTO: 0 /100 WBCS (ref 0–0)
PLATELET # BLD AUTO: 279 X10*3/UL (ref 150–450)
POTASSIUM SERPL-SCNC: 5 MMOL/L (ref 3.5–5.3)
PROT SERPL-MCNC: 6.1 G/DL (ref 6.4–8.2)
RBC # BLD AUTO: 4.5 X10*6/UL (ref 4–5.2)
SODIUM SERPL-SCNC: 139 MMOL/L (ref 136–145)
WBC # BLD AUTO: 9 X10*3/UL (ref 4.4–11.3)

## 2024-06-03 PROCEDURE — 36415 COLL VENOUS BLD VENIPUNCTURE: CPT

## 2024-06-03 PROCEDURE — 80053 COMPREHEN METABOLIC PANEL: CPT

## 2024-06-03 PROCEDURE — 85025 COMPLETE CBC W/AUTO DIFF WBC: CPT

## 2024-06-05 ENCOUNTER — OFFICE VISIT (OUTPATIENT)
Dept: HEMATOLOGY/ONCOLOGY | Facility: CLINIC | Age: 74
End: 2024-06-05
Payer: MEDICARE

## 2024-06-05 VITALS
OXYGEN SATURATION: 96 % | TEMPERATURE: 97.5 F | HEART RATE: 91 BPM | WEIGHT: 263.45 LBS | RESPIRATION RATE: 18 BRPM | DIASTOLIC BLOOD PRESSURE: 76 MMHG | BODY MASS INDEX: 40.06 KG/M2 | SYSTOLIC BLOOD PRESSURE: 121 MMHG

## 2024-06-05 DIAGNOSIS — D45 POLYCYTHEMIA VERA (MULTI): ICD-10-CM

## 2024-06-05 PROCEDURE — 99214 OFFICE O/P EST MOD 30 MIN: CPT | Performed by: STUDENT IN AN ORGANIZED HEALTH CARE EDUCATION/TRAINING PROGRAM

## 2024-06-05 PROCEDURE — 1125F AMNT PAIN NOTED PAIN PRSNT: CPT | Performed by: STUDENT IN AN ORGANIZED HEALTH CARE EDUCATION/TRAINING PROGRAM

## 2024-06-05 PROCEDURE — 3074F SYST BP LT 130 MM HG: CPT | Performed by: STUDENT IN AN ORGANIZED HEALTH CARE EDUCATION/TRAINING PROGRAM

## 2024-06-05 PROCEDURE — 3078F DIAST BP <80 MM HG: CPT | Performed by: STUDENT IN AN ORGANIZED HEALTH CARE EDUCATION/TRAINING PROGRAM

## 2024-06-05 PROCEDURE — 1159F MED LIST DOCD IN RCRD: CPT | Performed by: STUDENT IN AN ORGANIZED HEALTH CARE EDUCATION/TRAINING PROGRAM

## 2024-06-05 ASSESSMENT — PAIN SCALES - GENERAL: PAINLEVEL: 6

## 2024-06-05 NOTE — PROGRESS NOTES
Patient ID: Ileana Glover is a 74 y.o. female.  Referring Physician: Jayden Good MD  [id number] [place] [address]  Primary Care Provider: Ghassan Bush MD  Visit Type: Follow Up  Diagnosis: JAK2 +ve PV     Therapy summary (diagnosis: JAK2 +ve PV):   hydrea: 1000mg daily- (+500mg weekly, 6/5/2024)        Subjective    HPI  74 y.o. 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   Previously followed by Alessandra Tripp NP, previously followed with .    BMBX 5/2020 revealed  which showed GIRISH-2 positive mutation and was diagnosed with Polycythemia Vera in conjunction with erythrocytosis. Currently asymptomatic and no hx VTE.   Did not have phlebotomies as she does not like needles. Currently on hydrea 1000mg daily, dose last increased about a year back.   Has gained 20lbs in a couple months. Has not missed any doses. Does not note h/o ARIELLE/OHS.   No supplements. Has been adherent on her medications.     Age appropriate cancer screening: last colo 1yr back WNL, mammo 1/24 WNL. No p/h/o cancer.   FMH: father had a stroke at 76yrs   Social history: never smoker, occasionally drinks, no RDA.   06/05/24: presents alone. taking 1000mg daily. Asymptomatic. Not had phleb in recent past. Ongoing weight loss 170-->163lbs    Review of Systems - Oncology  10 point review of systems negative except as stated in HPI    Objective   Past Surgical History:   Procedure Laterality Date    KNEE ARTHROSCOPY W/ DEBRIDEMENT  12/10/2013    Arthroscopy Knee Left    OOPHORECTOMY  08/12/2014    Oophorectomy - Unilateral (Removal Of One Ovary)    OTHER SURGICAL HISTORY  03/08/2022    Tonsillectomy    OTHER SURGICAL HISTORY  01/30/2017    Wrist Surgery     Oncology History    No history exists.       Physical Exam  Vitals reviewed.   Constitutional:       General: She is not in acute distress.     Appearance: She is not toxic-appearing.       Comments: BMI 40   HENT:      Head: Normocephalic and atraumatic.   Eyes:      Comments: No pallor or icterus    Neck:      Comments: No palpable cervical or axillary adenopathy   Cardiovascular:      Rate and Rhythm: Normal rate.      Heart sounds: No murmur heard.  Pulmonary:      Effort: Pulmonary effort is normal.   Abdominal:      Tenderness: There is no abdominal tenderness.      Comments: No palpable splenomegaly    Musculoskeletal:      Comments: No pedal edema    Neurological:      General: No focal deficit present.      Mental Status: She is oriented to person, place, and time.   Psychiatric:         Mood and Affect: Mood normal.       BSA: 2.4 meters squared  /76   Pulse 91   Temp 36.4 °C (97.5 °F)   Resp 18   Wt 120 kg (263 lb 7.2 oz)   SpO2 96%   BMI 40.06 kg/m²     Labs:  Lab Results   Component Value Date    WBC 9.0 06/03/2024    NEUTROABS 5.49 06/03/2024    IGABSOL 0.06 06/03/2024    LYMPHSABS 2.64 06/03/2024    MONOSABS 0.63 06/03/2024    EOSABS 0.15 06/03/2024    BASOSABS 0.04 06/03/2024    RBC 4.50 06/03/2024     (H) 06/03/2024    MCHC 33.4 06/03/2024    HGB 15.4 06/03/2024    HCT 46.1 (H) 06/03/2024     06/03/2024     Lab Results   Component Value Date    RETICCTPCT 1.5 04/19/2021      Lab Results   Component Value Date    CREATININE 0.73 06/03/2024    BUN 18 06/03/2024    EGFR 86 06/03/2024     06/03/2024    K 5.0 06/03/2024     06/03/2024    CO2 29 06/03/2024      Lab Results   Component Value Date    ALT 21 06/03/2024    AST 17 06/03/2024    ALKPHOS 93 06/03/2024    BILITOT 0.7 06/03/2024     Lab Results   Component Value Date    IRON 136 03/29/2024    TIBC 294 03/29/2024    FERRITIN 63 03/29/2024     BMBx 5/2020: NORMOCELLULAR BONE MARROW (20-30%) WITH TRILINEAGE HEMATOPOIESIS, MILDLY  INCREASED RETICULIN FIBROSIS AND JAK2 EXON 12 MUTATION, SEE NOTE.    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 Age-adjusted marrow hypercellularity with panmyelosis is one of the three major criteria used to  establish a diagnosis of polycythemia vera. However, WHO criteria allow for the diagnosis in the absence of bone marrow biopsy (thereby omitting the criterion of demonstrating age-adjusted hypercellularity). The patient has a reported history of sustained erythrocytosis and myeloid NGS identified JAK2 exon 12 mutation with VAF of 12%, thereby fulfilling the other two major criteria. In large population studies JAK2 V617F but not the exon 12 mutation have been identified in clonal hematopoiesis of indeterminate potential (CHIP; Yelitza N Engl J Med 2014; 371:2488-249 ). Per EHR, erythropoietin levels are at the very low end of normal, consistent with EPO-independent erythroid proliferation. The overall combination of findings in this patient are most consistent with a diagnosis of polycythemia vera.    Flow cytometric studies identified a small CD8+ T-cell population (around 3% of events) similar to that identified previously in blood. 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 No significant marrow infiltration was identified by immunostains. The findings are NOT diagnostic for T-LGL.    Pending Genetic/Molecular testing per Hematopathology protocol:  -Chromosome analysis  -FISH: none  -Molecular: Myeloid NGS panel  The results will be reported separately.    Differential:           (Normal)    %    Promyelocytes         (1-5)       2.5  Myelocytes &   Metamyelocytes    (15-35)     20  Bands & Segs        (15-50)     34.5  Eosinophils               (2-4)      3  Basophils                  (0-1)      0  Lymphocytes           (5-25)     12  Monocytes                (0-2)      0  Plasma Cells             (0-2)     2  Blasts                        (0-1)     0.5  Total Erythroid        (17-35)    25.5  Number of cells counted: 200    Cellularity: Cellular               M:E Ratio: 2.5:1                                                            Electronically Signed Out By RADHA MARIN MD, PhD/CBR  By the signature on this report, the individual or group listed as making the  Final Interpretation/Diagnosis certifies that they have reviewed this case.          Addendum Diagnosis  TEST: Myeloid NGS Panel  DISEASE ASSOCIATED GENOMIC FINDINGS: JAK2 Exon 12 mutation [id number] ([id number]: [id number]) VAF: 12%  Note: The presence of a JAK2 exon 12 mutation fulfills a major WHO diagnostic criterion for establishing diagnosis of polycythemia vera (NCCN 2019).    Microscopic Description:  CBC: WBC 7.4 x 10E9/L, RBC 5.3 x 10E12/L, Hgb 16 g/dl, Hct 48.1%, MCV 91 fL, RDW 14.7%, platelets 435 x 10E9/L.  A 100 cell manual differential count reveals: polys 66%, lymphocytes 26%, monocytes 5%, eosinophils 3%.    PERIPHERAL SMEAR: Submitted             Red cells: Normal.       White cells: Occasional large granular lymphocytes and rare reactive-appearing lymphocytes.       Platelets: Normal, adequate.    ASPIRATE SMEAR: Submitted                         Specimen: Spicular.       Erythropoiesis: Normal.       Granulopoiesis: Normal.       Megakaryocytes: Present. Morphology: A subset of large and/or hyperlobulated megakaryocytes are present.    TOUCH PREP: Submitted       Specimen: Paucicellular.    ASPIRATE CLOT: Submitted                               Specimen: Aspicular.       Comments: There is a small focus degenerating non-hematopoietic cells present (<20 cells), most likely contamination.    CORE BIOPSY: Submitted                           Specimen: Adequate. Aspiration artifact present.       Cellularity: 20-30%.       Estimated M:E ratio: Consistent with aspirate smear.       Bony trabeculae: Normal.       Megakaryocytes: Adequate to slightly increased. Morphology: Occasional larger forms present. No significant clustering identified.       Granulomas: Absent.        Lymphoid aggregates: Absent.    SPECIAL STAINS:         Iron: Not able to be evaluated on aspicular clot.       Reticulin: Highlights mild increase in reticulin fobrosis (WHO grade MF-1)    IMMUNOHISTOCHEMISTRY: Performed.       CD20: Highlights scattered B-cells and a subset of cells within several  small aggregates       CD3: Highlights moderately numerous T-cells, including a subset of cells within lymphoid aggregates     CD4: Highlights a subset of T-cells; also weaky positive in moderately numerous monocytic cells       CD8: Highlights a subset of T-cells, in slight excess to CD4+ T-cells       CD56: Rare scattered positive cells       CD34: Highlights 1-2% blasts    Assessment/Plan    74 y.o. 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   Previously followed by Alessandra Tripp NP, previously followed with .       # JAK2 exon 12 mutated polycythemia vera: confirmed on BMBx. Has been on hydrea, but has had very few labs which have noted hct consistently >45, despite having a knee replacement. Her most recent labs showed the same. Attempted to add on iron panel, which was canceled by lab, pt to redraw.   Plan:  - hct is borderline/above goal repeatedly, will increase dose by 500mg weekly, every Wednesday for now. 1000mg 6 days per week.   - check labs weekly x3  - follow up next: 3 months   - labs prior to follow up: CBC/diff, CMP    Jayden Higginbotham MD

## 2024-06-10 ENCOUNTER — LAB (OUTPATIENT)
Dept: LAB | Facility: LAB | Age: 74
End: 2024-06-10
Payer: MEDICARE

## 2024-06-10 DIAGNOSIS — D45 POLYCYTHEMIA VERA (MULTI): ICD-10-CM

## 2024-06-10 LAB
ALBUMIN SERPL BCP-MCNC: 4.1 G/DL (ref 3.4–5)
ALP SERPL-CCNC: 92 U/L (ref 33–136)
ALT SERPL W P-5'-P-CCNC: 20 U/L (ref 7–45)
ANION GAP SERPL CALC-SCNC: 14 MMOL/L (ref 10–20)
AST SERPL W P-5'-P-CCNC: 19 U/L (ref 9–39)
BASOPHILS # BLD AUTO: 0.03 X10*3/UL (ref 0–0.1)
BASOPHILS NFR BLD AUTO: 0.5 %
BILIRUB SERPL-MCNC: 0.7 MG/DL (ref 0–1.2)
BUN SERPL-MCNC: 14 MG/DL (ref 6–23)
CALCIUM SERPL-MCNC: 9.6 MG/DL (ref 8.6–10.6)
CHLORIDE SERPL-SCNC: 104 MMOL/L (ref 98–107)
CO2 SERPL-SCNC: 27 MMOL/L (ref 21–32)
CREAT SERPL-MCNC: 0.7 MG/DL (ref 0.5–1.05)
EGFRCR SERPLBLD CKD-EPI 2021: 90 ML/MIN/1.73M*2
EOSINOPHIL # BLD AUTO: 0.13 X10*3/UL (ref 0–0.4)
EOSINOPHIL NFR BLD AUTO: 2.1 %
ERYTHROCYTE [DISTWIDTH] IN BLOOD BY AUTOMATED COUNT: 14.3 % (ref 11.5–14.5)
GLUCOSE SERPL-MCNC: 83 MG/DL (ref 74–99)
HCT VFR BLD AUTO: 46.8 % (ref 36–46)
HGB BLD-MCNC: 15.5 G/DL (ref 12–16)
IMM GRANULOCYTES # BLD AUTO: 0.01 X10*3/UL (ref 0–0.5)
IMM GRANULOCYTES NFR BLD AUTO: 0.2 % (ref 0–0.9)
LYMPHOCYTES # BLD AUTO: 2.08 X10*3/UL (ref 0.8–3)
LYMPHOCYTES NFR BLD AUTO: 33.3 %
MCH RBC QN AUTO: 34.3 PG (ref 26–34)
MCHC RBC AUTO-ENTMCNC: 33.1 G/DL (ref 32–36)
MCV RBC AUTO: 104 FL (ref 80–100)
MONOCYTES # BLD AUTO: 0.36 X10*3/UL (ref 0.05–0.8)
MONOCYTES NFR BLD AUTO: 5.8 %
NEUTROPHILS # BLD AUTO: 3.63 X10*3/UL (ref 1.6–5.5)
NEUTROPHILS NFR BLD AUTO: 58.1 %
NRBC BLD-RTO: 0 /100 WBCS (ref 0–0)
PLATELET # BLD AUTO: 267 X10*3/UL (ref 150–450)
POTASSIUM SERPL-SCNC: 4.8 MMOL/L (ref 3.5–5.3)
PROT SERPL-MCNC: 6.3 G/DL (ref 6.4–8.2)
RBC # BLD AUTO: 4.52 X10*6/UL (ref 4–5.2)
SODIUM SERPL-SCNC: 140 MMOL/L (ref 136–145)
WBC # BLD AUTO: 6.2 X10*3/UL (ref 4.4–11.3)

## 2024-06-10 PROCEDURE — 85025 COMPLETE CBC W/AUTO DIFF WBC: CPT

## 2024-06-10 PROCEDURE — 36415 COLL VENOUS BLD VENIPUNCTURE: CPT

## 2024-06-10 PROCEDURE — 80053 COMPREHEN METABOLIC PANEL: CPT

## 2024-06-18 ENCOUNTER — LAB (OUTPATIENT)
Dept: LAB | Facility: LAB | Age: 74
End: 2024-06-18
Payer: MEDICARE

## 2024-06-18 DIAGNOSIS — D45 POLYCYTHEMIA VERA (MULTI): ICD-10-CM

## 2024-06-18 LAB
ALBUMIN SERPL BCP-MCNC: 4.2 G/DL (ref 3.4–5)
ALP SERPL-CCNC: 94 U/L (ref 33–136)
ALT SERPL W P-5'-P-CCNC: 16 U/L (ref 7–45)
ANION GAP SERPL CALC-SCNC: 14 MMOL/L (ref 10–20)
AST SERPL W P-5'-P-CCNC: 19 U/L (ref 9–39)
BASOPHILS # BLD AUTO: 0.04 X10*3/UL (ref 0–0.1)
BASOPHILS NFR BLD AUTO: 0.7 %
BILIRUB SERPL-MCNC: 0.6 MG/DL (ref 0–1.2)
BUN SERPL-MCNC: 13 MG/DL (ref 6–23)
CALCIUM SERPL-MCNC: 9.7 MG/DL (ref 8.6–10.6)
CHLORIDE SERPL-SCNC: 104 MMOL/L (ref 98–107)
CO2 SERPL-SCNC: 28 MMOL/L (ref 21–32)
CREAT SERPL-MCNC: 0.76 MG/DL (ref 0.5–1.05)
EGFRCR SERPLBLD CKD-EPI 2021: 82 ML/MIN/1.73M*2
EOSINOPHIL # BLD AUTO: 0.1 X10*3/UL (ref 0–0.4)
EOSINOPHIL NFR BLD AUTO: 1.8 %
ERYTHROCYTE [DISTWIDTH] IN BLOOD BY AUTOMATED COUNT: 13.7 % (ref 11.5–14.5)
GLUCOSE SERPL-MCNC: 86 MG/DL (ref 74–99)
HCT VFR BLD AUTO: 44 % (ref 36–46)
HGB BLD-MCNC: 14.8 G/DL (ref 12–16)
IMM GRANULOCYTES # BLD AUTO: 0.02 X10*3/UL (ref 0–0.5)
IMM GRANULOCYTES NFR BLD AUTO: 0.4 % (ref 0–0.9)
LYMPHOCYTES # BLD AUTO: 2.32 X10*3/UL (ref 0.8–3)
LYMPHOCYTES NFR BLD AUTO: 40.6 %
MCH RBC QN AUTO: 33.8 PG (ref 26–34)
MCHC RBC AUTO-ENTMCNC: 33.6 G/DL (ref 32–36)
MCV RBC AUTO: 101 FL (ref 80–100)
MONOCYTES # BLD AUTO: 0.46 X10*3/UL (ref 0.05–0.8)
MONOCYTES NFR BLD AUTO: 8.1 %
NEUTROPHILS # BLD AUTO: 2.77 X10*3/UL (ref 1.6–5.5)
NEUTROPHILS NFR BLD AUTO: 48.4 %
NRBC BLD-RTO: 0 /100 WBCS (ref 0–0)
PLATELET # BLD AUTO: 304 X10*3/UL (ref 150–450)
POTASSIUM SERPL-SCNC: 5 MMOL/L (ref 3.5–5.3)
PROT SERPL-MCNC: 6.3 G/DL (ref 6.4–8.2)
RBC # BLD AUTO: 4.38 X10*6/UL (ref 4–5.2)
SODIUM SERPL-SCNC: 141 MMOL/L (ref 136–145)
WBC # BLD AUTO: 5.7 X10*3/UL (ref 4.4–11.3)

## 2024-06-18 PROCEDURE — 36415 COLL VENOUS BLD VENIPUNCTURE: CPT

## 2024-06-18 PROCEDURE — 80053 COMPREHEN METABOLIC PANEL: CPT

## 2024-06-18 PROCEDURE — 85025 COMPLETE CBC W/AUTO DIFF WBC: CPT

## 2024-06-24 ENCOUNTER — LAB (OUTPATIENT)
Dept: LAB | Facility: LAB | Age: 74
End: 2024-06-24
Payer: MEDICARE

## 2024-06-24 DIAGNOSIS — D45 POLYCYTHEMIA VERA (MULTI): ICD-10-CM

## 2024-06-24 LAB
ALBUMIN SERPL BCP-MCNC: 4 G/DL (ref 3.4–5)
ALP SERPL-CCNC: 87 U/L (ref 33–136)
ALT SERPL W P-5'-P-CCNC: 15 U/L (ref 7–45)
ANION GAP SERPL CALC-SCNC: 12 MMOL/L (ref 10–20)
AST SERPL W P-5'-P-CCNC: 13 U/L (ref 9–39)
BASOPHILS # BLD AUTO: 0.01 X10*3/UL (ref 0–0.1)
BASOPHILS NFR BLD AUTO: 0.1 %
BILIRUB SERPL-MCNC: 0.4 MG/DL (ref 0–1.2)
BUN SERPL-MCNC: 17 MG/DL (ref 6–23)
CALCIUM SERPL-MCNC: 9.9 MG/DL (ref 8.6–10.6)
CHLORIDE SERPL-SCNC: 107 MMOL/L (ref 98–107)
CO2 SERPL-SCNC: 27 MMOL/L (ref 21–32)
CREAT SERPL-MCNC: 0.78 MG/DL (ref 0.5–1.05)
EGFRCR SERPLBLD CKD-EPI 2021: 79 ML/MIN/1.73M*2
EOSINOPHIL # BLD AUTO: 0 X10*3/UL (ref 0–0.4)
EOSINOPHIL NFR BLD AUTO: 0 %
ERYTHROCYTE [DISTWIDTH] IN BLOOD BY AUTOMATED COUNT: 14.2 % (ref 11.5–14.5)
GLUCOSE SERPL-MCNC: 92 MG/DL (ref 74–99)
HCT VFR BLD AUTO: 46.4 % (ref 36–46)
HGB BLD-MCNC: 15.4 G/DL (ref 12–16)
IMM GRANULOCYTES # BLD AUTO: 0.1 X10*3/UL (ref 0–0.5)
IMM GRANULOCYTES NFR BLD AUTO: 1.2 % (ref 0–0.9)
LYMPHOCYTES # BLD AUTO: 1.54 X10*3/UL (ref 0.8–3)
LYMPHOCYTES NFR BLD AUTO: 18.4 %
MCH RBC QN AUTO: 34 PG (ref 26–34)
MCHC RBC AUTO-ENTMCNC: 33.2 G/DL (ref 32–36)
MCV RBC AUTO: 102 FL (ref 80–100)
MONOCYTES # BLD AUTO: 0.69 X10*3/UL (ref 0.05–0.8)
MONOCYTES NFR BLD AUTO: 8.3 %
NEUTROPHILS # BLD AUTO: 6.01 X10*3/UL (ref 1.6–5.5)
NEUTROPHILS NFR BLD AUTO: 72 %
NRBC BLD-RTO: 0 /100 WBCS (ref 0–0)
PLATELET # BLD AUTO: 363 X10*3/UL (ref 150–450)
POTASSIUM SERPL-SCNC: 5.2 MMOL/L (ref 3.5–5.3)
PROT SERPL-MCNC: 6.2 G/DL (ref 6.4–8.2)
RBC # BLD AUTO: 4.53 X10*6/UL (ref 4–5.2)
SODIUM SERPL-SCNC: 141 MMOL/L (ref 136–145)
WBC # BLD AUTO: 8.4 X10*3/UL (ref 4.4–11.3)

## 2024-06-24 PROCEDURE — 80053 COMPREHEN METABOLIC PANEL: CPT

## 2024-06-24 PROCEDURE — 85025 COMPLETE CBC W/AUTO DIFF WBC: CPT

## 2024-06-24 PROCEDURE — 36415 COLL VENOUS BLD VENIPUNCTURE: CPT

## 2024-07-15 NOTE — PROGRESS NOTES
Subjective   Patient ID: Ileana Golver is a 74 y.o. female who presents for follow up  HPI  74-year-old with urge or near incontinence, pelvic floor weakness, vaginal atrophy, and mild constipation.        The patient is utilizing her er estrogen cream and taking her oxybutynin medication which has showed her improvement.  She is consuming excessive water due to her hydroxyurea, she is noting increased frequency about 7-8 times a day. She notes 1-2 episodes of nocturia but denies enuresis. She is unable to provide a urine sample today, Her PVR is 32 ml.     She denies any vaginal complaints, no abnormal bleeding or discharge.     She denies any bowel related complaints, no fecal or flatal incontinence.    She has no other complaints.      From Previous note  73-year-old with urge or near incontinence, pelvic floor weakness, vaginal atrophy, and mild constipation.     She notes 2 accidents but attributes this to her waiting too long to go to the bathroom. She is still using her estrogen cream and taking her oxybutynin medication which has showed her improvement. She is overall very satisfied with her present therapy.     She is proceeding with a hip replacement and knee replacement in the next few months.     She has no other complaints.      From previous note  72-year-old with urge or near incontinence, pelvic floor weakness, vaginal atrophy, and mild constipation     Patient is extremely satisfied with her present oxybutynin and vaginal estrogen therapy. She has noted near complete resolution of her nocturia complaints as well as improved daytime urgency and frequency. She denies any urge related incontinence complaints on the oxybutynin as well. She denies any significant dry mouth complaints or constipation. She is overall very satisfied with the therapy.     She is also utilizing her vaginal estrogen therapy.     She has no other complaints.     From previous note  72-year-old presenting as a referral from  "Dr. Coleman with urinary incontinence complaints.     The patient has a roughly 2 to 3-year history of worsening urge urinary incontinence complaints. She denies any significant frequency but notes significant urge and urge related incontinence during the daytime. She also notes 2-3 episodes of nocturia. She denies enuresis. She does note rare stress urinary incontinence but this is typically associated when she is \"tired\". She also denies a history of nephrolithiasis, chronic urinary tract infections, or any gross hematuria.     She denies any vaginal complaints. She denies any abnormal vaginal bleeding or discharge. She is not sexually active. She denies any prolapse complaints.     She does have episodic constipation and utilizes Metamucil for this daily. She denies any fecal or flatal incontinence.     She has no other complaints.        Review of Systems  Constitutional: No fever, No chills and No fatigue.   Eyes: No vision problems and No dryness of the eyes.   ENT: No dry mouth, No hearing loss and No nosebleeds.   Cardiovascular: No chest pain, No palpitations and No orthopnea.   Respiratory: No shortness of breath, No cough and No wheezing.   Gastrointestinal: No abdominal pain, No constipation, No nausea, No diarrhea, No vomiting and No melena.   Genitourinary: As noted in HPI.   Musculoskeletal: No back pain, No myalgias, No muscle weakness, No joint swelling and No leg edema.   Integumentary: No rashes, No skin lesion and No itching.   Neurological: No headache, No numbness and No dizziness.   Psychiatric: No sleep disturbances, No anxiety and No depression.   Endocrine: No hot flashes, No loss of hair and No hirsutism.   Hematologic/Lymphatic: No swollen glands, No tendency for easy bleeding and No tendency for easy bruising.   All other systems have been reviewed and are negative for complaint.        Objective   Physical Exam  PHYSICAL EXAMINATION:  No LMP recorded. Patient is postmenopausal.  There " is no height or weight on file to calculate BMI.  There were no vitals taken for this visit.  General Appearance: well appearing  Neuro: Alert and oriented   HEENT: mucous membranes moist, neck supple  Resp: No respiratory distress, normal work of breathing  MSK: normal range of motion, gait appropriate    Assessment/Plan   74-year-old with urge or near incontinence, pelvic floor weakness, vaginal atrophy, and mild constipation.     1. We again discussed the patient's urge or near incontinence complaints.  She has needed to increase her water intake due to her hydroxyurea diagnosis and has noted worsening urinary urgency and frequency concerns.  She was previously having excellent results with her oxybutynin therapy. She will continue this in the morning and we will add Gemtesa therapy at night for dual therapy.  She understands to stop this medication immediately should she have any bothersome side effects.     2. We discussed the patient's mild pelvic floor weakness. She is not interested in pelvic floor physical therapy at this time.     3. We again discussed the patient's vaginal atrophy. We again discussed the safety, efficacy, and proper utilization of vaginal estrogen therapy. She will continue this 3 times a week moving forward. New prescription was sent into her retail pharmacy.     4. We have previously discussed the patient's mild constipation complaints. She will continue her daily Metamucil. We discussed titrating MiraLAX to a soft bowel movement every day to 2 days.      5. The patient will follow up in 4 to 6 weeks to discuss her dual therapy with Gemtesa.     EMERY Salazar MD       Scribe Attestation  By signing my name below, Soheila ORTEGA Scribe attest that this documentation has been prepared under the direction and in the presence of Shawn Salazar MD. All medical record entries made by the Scribe were at my direction or personally dictated by me. I have reviewed the chart and  agree that the record accurately reflects my personal performance of the history, physical exam, discussion and plan.

## 2024-07-16 ENCOUNTER — OFFICE VISIT (OUTPATIENT)
Dept: UROLOGY | Facility: CLINIC | Age: 74
End: 2024-07-16
Payer: MEDICARE

## 2024-07-16 VITALS
WEIGHT: 261.9 LBS | DIASTOLIC BLOOD PRESSURE: 71 MMHG | HEART RATE: 52 BPM | SYSTOLIC BLOOD PRESSURE: 163 MMHG | BODY MASS INDEX: 39.82 KG/M2

## 2024-07-16 DIAGNOSIS — N32.81 OAB (OVERACTIVE BLADDER): ICD-10-CM

## 2024-07-16 DIAGNOSIS — R39.15 URINARY URGENCY: ICD-10-CM

## 2024-07-16 DIAGNOSIS — N95.2 VAGINAL ATROPHY: ICD-10-CM

## 2024-07-16 DIAGNOSIS — N39.41 URGE URINARY INCONTINENCE: Primary | ICD-10-CM

## 2024-07-16 DIAGNOSIS — N81.89 PELVIC FLOOR WEAKNESS: ICD-10-CM

## 2024-07-16 DIAGNOSIS — K59.01 SLOW TRANSIT CONSTIPATION: ICD-10-CM

## 2024-07-16 PROCEDURE — 1159F MED LIST DOCD IN RCRD: CPT | Performed by: OBSTETRICS & GYNECOLOGY

## 2024-07-16 PROCEDURE — 99214 OFFICE O/P EST MOD 30 MIN: CPT | Performed by: OBSTETRICS & GYNECOLOGY

## 2024-07-16 PROCEDURE — 1036F TOBACCO NON-USER: CPT | Performed by: OBSTETRICS & GYNECOLOGY

## 2024-07-16 PROCEDURE — 3078F DIAST BP <80 MM HG: CPT | Performed by: OBSTETRICS & GYNECOLOGY

## 2024-07-16 PROCEDURE — 3077F SYST BP >= 140 MM HG: CPT | Performed by: OBSTETRICS & GYNECOLOGY

## 2024-07-16 RX ORDER — ESTRADIOL 0.1 MG/G
CREAM VAGINAL
Qty: 42.5 G | Refills: 2 | Status: SHIPPED | OUTPATIENT
Start: 2024-07-16

## 2024-07-16 RX ORDER — OXYBUTYNIN CHLORIDE 10 MG/1
10 TABLET, EXTENDED RELEASE ORAL DAILY
Qty: 90 TABLET | Refills: 3 | Status: SHIPPED | OUTPATIENT
Start: 2024-07-16

## 2024-07-16 NOTE — PATIENT INSTRUCTIONS
Please continue your oxybutynin therapy daily.  A new 90-day supply was called into her pharmacy.    Please start your new Gemtesa medication daily at night to help with your bladder complaints.    Please continue her vaginal estrogen therapy 3 times a week.    Please continue to use MiraLAX and fiber to help with any constipation complaints.    Please follow-up in 4 to 6 weeks to discuss her lower urinary tract symptoms.    Please contact the clinic with any questions or concerns.    [phone]

## 2024-08-19 NOTE — PROGRESS NOTES
Subjective   Patient ID: Ileana Glover is a 74 y.o. female who presents for follow up  HPI  74-year-old with urge or near incontinence, pelvic floor weakness, vaginal atrophy, and mild constipation.    The patient states that Gemtesa was cost prohibitive at $500 hence she could not start the medication but is continuing her oxybutynin medication which has showed her improvement. She has post COVID dry mouth but denies any constipation complaints with this medication.  She is consuming excessive water due to her hydroxyurea, she is noting increased frequency about 7-8 times a day. She notes 1-2 episodes of nocturia but denies enuresis. She is unable to provide a urine sample today, Her PVR is 32 ml.     She denies any vaginal complaints, no abnormal bleeding or discharge. She is utilizing the vaginal estrogen cream.     She denies any bowel related complaints, no fecal or flatal incontinence.    She has no other complaints.  From Previous note  74-year-old with urge or near incontinence, pelvic floor weakness, vaginal atrophy, and mild constipation.    The patient is utilizing her  estrogen cream and taking her oxybutynin medication which has showed her improvement.  She is consuming excessive water due to her hydroxyurea, she is noting increased frequency about 7-8 times a day. She notes 1-2 episodes of nocturia but denies enuresis. She is unable to provide a urine sample today, Her PVR is 32 ml.     She denies any vaginal complaints, no abnormal bleeding or discharge.     She denies any bowel related complaints, no fecal or flatal incontinence.    She has no other complaints.      From Previous note  73-year-old with urge or near incontinence, pelvic floor weakness, vaginal atrophy, and mild constipation.     She notes 2 accidents but attributes this to her waiting too long to go to the bathroom. She is still using her estrogen cream and taking her oxybutynin medication which has showed her improvement. She is  "overall very satisfied with her present therapy.     She is proceeding with a hip replacement and knee replacement in the next few months.     She has no other complaints.      From previous note  72-year-old with urge or near incontinence, pelvic floor weakness, vaginal atrophy, and mild constipation     Patient is extremely satisfied with her present oxybutynin and vaginal estrogen therapy. She has noted near complete resolution of her nocturia complaints as well as improved daytime urgency and frequency. She denies any urge related incontinence complaints on the oxybutynin as well. She denies any significant dry mouth complaints or constipation. She is overall very satisfied with the therapy.     She is also utilizing her vaginal estrogen therapy.     She has no other complaints.     From previous note  72-year-old presenting as a referral from Dr. Coleman with urinary incontinence complaints.     The patient has a roughly 2 to 3-year history of worsening urge urinary incontinence complaints. She denies any significant frequency but notes significant urge and urge related incontinence during the daytime. She also notes 2-3 episodes of nocturia. She denies enuresis. She does note rare stress urinary incontinence but this is typically associated when she is \"tired\". She also denies a history of nephrolithiasis, chronic urinary tract infections, or any gross hematuria.     She denies any vaginal complaints. She denies any abnormal vaginal bleeding or discharge. She is not sexually active. She denies any prolapse complaints.     She does have episodic constipation and utilizes Metamucil for this daily. She denies any fecal or flatal incontinence.     She has no other complaints.        Review of Systems  Constitutional: No fever, No chills and No fatigue.   Eyes: No vision problems and No dryness of the eyes.   ENT: No dry mouth, No hearing loss and No nosebleeds.   Cardiovascular: No chest pain, No palpitations and " No orthopnea.   Respiratory: No shortness of breath, No cough and No wheezing.   Gastrointestinal: No abdominal pain, No constipation, No nausea, No diarrhea, No vomiting and No melena.   Genitourinary: As noted in HPI.   Musculoskeletal: No back pain, No myalgias, No muscle weakness, No joint swelling and No leg edema.   Integumentary: No rashes, No skin lesion and No itching.   Neurological: No headache, No numbness and No dizziness.   Psychiatric: No sleep disturbances, No anxiety and No depression.   Endocrine: No hot flashes, No loss of hair and No hirsutism.   Hematologic/Lymphatic: No swollen glands, No tendency for easy bleeding and No tendency for easy bruising.   All other systems have been reviewed and are negative for complaint.        Objective   Physical Exam  PHYSICAL EXAMINATION:  No LMP recorded. Patient is postmenopausal.  There is no height or weight on file to calculate BMI.  There were no vitals taken for this visit.  General Appearance: well appearing  Neuro: Alert and oriented   HEENT: mucous membranes moist, neck supple  Resp: No respiratory distress, normal work of breathing  MSK: normal range of motion, gait appropriate    Assessment/Plan   74-year-old with urge or near incontinence, pelvic floor weakness, vaginal atrophy, and mild constipation.     1. We again discussed the patient's urge urinary incontinence complaints.  She has needed to increase her water intake due to her hydroxyurea diagnosis and has noted worsening urinary urgency and frequency concerns.  She was previously having excellent results with her oxybutynin therapy.  Unfortunately Gemtesa therapy was cost prohibitive at $500 for 30-day supply.  We discussed working on a prior authorization/tier exemption.  Should this continue to be cost prohibitive, we will proceed with increasing her oxybutynin to 10 mg twice daily.  She will follow-up in 2 weeks to discuss her therapy should this be necessary.  We also discussed third  line options and proceeding with 100 units Botox should she continue to have bothersome urinary symptoms moving forward.    2. We discussed the patient's mild pelvic floor weakness. She is not interested in pelvic floor physical therapy at this time.     3. We again discussed the patient's vaginal atrophy. We again discussed the safety, efficacy, and proper utilization of vaginal estrogen therapy. She will continue this 3 times a week moving forward. New prescription was sent into her retail pharmacy.     4. We have previously discussed the patient's mild constipation complaints. She will continue her daily Metamucil. We discussed titrating MiraLAX to a soft bowel movement every day to 2 days.      5.  We will work on prior authorization for Gemtesa and she will use this as dual therapy for the next 4 to 6 weeks.  Should this be cost prohibitive, we will proceed with twice daily 10 mg oxybutynin for 2 weeks and to discuss her symptoms thereafter.     EMERY Salazar MD       Scribe Attestation  By signing my name below, ISoheila Scribe attest that this documentation has been prepared under the direction and in the presence of Shawn Salazar MD. All medical record entries made by the Scribe were at my direction or personally dictated by me. I have reviewed the chart and agree that the record accurately reflects my personal performance of the history, physical exam, discussion and plan.

## 2024-08-20 ENCOUNTER — APPOINTMENT (OUTPATIENT)
Dept: UROLOGY | Facility: CLINIC | Age: 74
End: 2024-08-20
Payer: MEDICARE

## 2024-08-20 VITALS
WEIGHT: 264.1 LBS | SYSTOLIC BLOOD PRESSURE: 139 MMHG | DIASTOLIC BLOOD PRESSURE: 66 MMHG | HEART RATE: 59 BPM | BODY MASS INDEX: 40.16 KG/M2

## 2024-08-20 DIAGNOSIS — N39.41 URGE URINARY INCONTINENCE: Primary | ICD-10-CM

## 2024-08-20 DIAGNOSIS — K59.01 SLOW TRANSIT CONSTIPATION: ICD-10-CM

## 2024-08-20 DIAGNOSIS — N95.2 VAGINAL ATROPHY: ICD-10-CM

## 2024-08-20 DIAGNOSIS — N81.89 PELVIC FLOOR WEAKNESS: ICD-10-CM

## 2024-08-20 LAB
POC APPEARANCE, URINE: CLEAR
POC BILIRUBIN, URINE: NEGATIVE
POC BLOOD, URINE: ABNORMAL
POC COLOR, URINE: YELLOW
POC GLUCOSE, URINE: NEGATIVE MG/DL
POC KETONES, URINE: NEGATIVE MG/DL
POC LEUKOCYTES, URINE: NEGATIVE
POC NITRITE,URINE: NEGATIVE
POC PH, URINE: 6 PH
POC PROTEIN, URINE: NEGATIVE MG/DL
POC SPECIFIC GRAVITY, URINE: 1.02
POC UROBILINOGEN, URINE: 0.2 EU/DL

## 2024-08-20 PROCEDURE — 3075F SYST BP GE 130 - 139MM HG: CPT | Performed by: OBSTETRICS & GYNECOLOGY

## 2024-08-20 PROCEDURE — 3078F DIAST BP <80 MM HG: CPT | Performed by: OBSTETRICS & GYNECOLOGY

## 2024-08-20 PROCEDURE — 99213 OFFICE O/P EST LOW 20 MIN: CPT | Performed by: OBSTETRICS & GYNECOLOGY

## 2024-08-20 PROCEDURE — 1036F TOBACCO NON-USER: CPT | Performed by: OBSTETRICS & GYNECOLOGY

## 2024-08-20 PROCEDURE — 1159F MED LIST DOCD IN RCRD: CPT | Performed by: OBSTETRICS & GYNECOLOGY

## 2024-08-20 PROCEDURE — 1160F RVW MEDS BY RX/DR IN RCRD: CPT | Performed by: OBSTETRICS & GYNECOLOGY

## 2024-08-20 PROCEDURE — 81003 URINALYSIS AUTO W/O SCOPE: CPT | Performed by: OBSTETRICS & GYNECOLOGY

## 2024-08-20 PROCEDURE — G2211 COMPLEX E/M VISIT ADD ON: HCPCS | Performed by: OBSTETRICS & GYNECOLOGY

## 2024-08-20 NOTE — PATIENT INSTRUCTIONS
We will work on a prior authorization for your Gemtesa Therapy.     Should it not be covered by your insurance you will contacted and you can increase your Oxybutynin to twice day.    We also discussed third line therapy options, including Botox, InterStim and PTNS.     Please continue your vaginal estrogen therapy nightly for 3 times a week. Do not use the plastic applicator and only use your fingertip.     Call the clinic with questions or concerns.    [phone]

## 2024-08-21 ENCOUNTER — APPOINTMENT (OUTPATIENT)
Dept: PRIMARY CARE | Facility: CLINIC | Age: 74
End: 2024-08-21
Payer: MEDICARE

## 2024-08-21 VITALS — DIASTOLIC BLOOD PRESSURE: 78 MMHG | SYSTOLIC BLOOD PRESSURE: 140 MMHG

## 2024-08-21 DIAGNOSIS — E03.9 HYPOTHYROIDISM, UNSPECIFIED TYPE: Primary | ICD-10-CM

## 2024-08-21 DIAGNOSIS — I10 PRIMARY HYPERTENSION: ICD-10-CM

## 2024-08-21 DIAGNOSIS — D45 POLYCYTHEMIA VERA (MULTI): ICD-10-CM

## 2024-08-21 DIAGNOSIS — E66.01 SEVERE OBESITY (MULTI): ICD-10-CM

## 2024-08-21 DIAGNOSIS — E78.2 MIXED HYPERLIPIDEMIA: ICD-10-CM

## 2024-08-21 PROCEDURE — G2211 COMPLEX E/M VISIT ADD ON: HCPCS | Performed by: STUDENT IN AN ORGANIZED HEALTH CARE EDUCATION/TRAINING PROGRAM

## 2024-08-21 PROCEDURE — 99214 OFFICE O/P EST MOD 30 MIN: CPT | Performed by: STUDENT IN AN ORGANIZED HEALTH CARE EDUCATION/TRAINING PROGRAM

## 2024-08-21 PROCEDURE — 3077F SYST BP >= 140 MM HG: CPT | Performed by: STUDENT IN AN ORGANIZED HEALTH CARE EDUCATION/TRAINING PROGRAM

## 2024-08-21 PROCEDURE — 3078F DIAST BP <80 MM HG: CPT | Performed by: STUDENT IN AN ORGANIZED HEALTH CARE EDUCATION/TRAINING PROGRAM

## 2024-08-21 NOTE — PROGRESS NOTES
Subjective   Patient ID: Ileana Glover is a 74 y.o. female who presents for Follow-up (Intermittent H/A).    HPI   Endorsing severe pain in her joints. Following up with ortho for knee, hip, neck, and shoulder. Takes OTC ibuprofen and tylenol as needed. Saw pain management for an injection in the neck which helped significantly, will follow up. Doing PT at home to help with her plantar fasciitis. States walking her dog worsens her symptoms. Was using Dr Do's OTC shoe insert that was helping. Has questions about her low protein levels noted on her lab work, was reassured.     Review of Systems  Negative with the exception of HPI.  Objective   /78     Physical Exam  Physical Exam  GEN: conversant, NAD  HEENT: PERRL, EOMI, MMM  NECK: supple  CV: S1, S2, RRR  PULM: CTAB  ABD: soft, NT, obese  NEURO: no new gross focal deficits  EXT: no sig LE edema  PSYCH: appropriate affect    Assessment/Plan     #Hypertension: increase olmesartan to 10mg daily. Monitor metabolic panel. Continue amlodipine.     #Tinnitus: right TM clear. Offered referral to ENT, she declines for now.     #Hypothyroidism: Maintained on levothyroxine to 150mcg daily. Recheck lab levels.     #Polycythemia vera/thrombocytosis: Seeing hematology. Continue hydroxyurea, ASA 81 mg daily.     #Hyperlipidemia: Continue statin.      #Obesity, severe, class 3: Continue lifestyle modifications, swimming regularly, weight watchers.     #Hip/Knee/shoulder OA: following with Juan Nelson and [facility]. Had shoulder surgery with Dr. Sotelo in 7/2022. Had right NOEMI 9/2023    #Chronic pain: established with pain management - hx of neck injections with significant improvement in symptoms     #Skin lesions: seeing dermatology      #Health maintenance:   Follows with Ophthalmology, Dentist regularly.  Follows with gynecology, who orders mammograms regularly.   DEXA performed 2022  Tetanus vaccine in 2013.   Pneumovax 2015. Prevnar 20 recommended  Colonoscopy  performed 5/2023, 3 year fu advised. Received Shingrix and COVID-19 vaccine. Advised yearly flu shot.  Longitudinal care.  Multiple questions addressed.     RTC 3-4 mo   Rhina Randall M.D.   Internal Medicine, PGY-3    Trainee role: Resident    I saw and evaluated the patient. I personally obtained the key and critical portions of the history and physical exam or was physically present for key and critical portions performed by the trainee. I reviewed the trainee's documentation and discussed the patient with the trainee. I agree with the trainee's medical decision making as documented on the trainee's notes.    Ghassan Bush M.D.

## 2024-08-22 ENCOUNTER — APPOINTMENT (OUTPATIENT)
Dept: ORTHOPEDIC SURGERY | Facility: CLINIC | Age: 74
End: 2024-08-22
Payer: MEDICARE

## 2024-08-26 ENCOUNTER — APPOINTMENT (OUTPATIENT)
Dept: PRIMARY CARE | Facility: CLINIC | Age: 74
End: 2024-08-26
Payer: MEDICARE

## 2024-08-26 DIAGNOSIS — E78.2 MIXED HYPERLIPIDEMIA: ICD-10-CM

## 2024-08-26 RX ORDER — SIMVASTATIN 20 MG/1
20 TABLET, FILM COATED ORAL DAILY
Qty: 90 TABLET | Refills: 1 | Status: SHIPPED | OUTPATIENT
Start: 2024-08-26

## 2024-08-29 ENCOUNTER — APPOINTMENT (OUTPATIENT)
Dept: ORTHOPEDIC SURGERY | Facility: CLINIC | Age: 74
End: 2024-08-29
Payer: MEDICARE

## 2024-08-29 ENCOUNTER — LAB (OUTPATIENT)
Dept: LAB | Facility: LAB | Age: 74
End: 2024-08-29
Payer: MEDICARE

## 2024-08-29 DIAGNOSIS — D45 JAK2 POSITIVE POLYCYTHEMIA VERA (MULTI): ICD-10-CM

## 2024-08-29 DIAGNOSIS — E03.9 HYPOTHYROIDISM, UNSPECIFIED TYPE: ICD-10-CM

## 2024-08-29 LAB
ALBUMIN SERPL BCP-MCNC: 4.2 G/DL (ref 3.4–5)
ALP SERPL-CCNC: 74 U/L (ref 33–136)
ALT SERPL W P-5'-P-CCNC: 14 U/L (ref 7–45)
ANION GAP SERPL CALC-SCNC: 11 MMOL/L (ref 10–20)
AST SERPL W P-5'-P-CCNC: 17 U/L (ref 9–39)
BASOPHILS # BLD AUTO: 0.01 X10*3/UL (ref 0–0.1)
BASOPHILS NFR BLD AUTO: 0.1 %
BILIRUB SERPL-MCNC: 0.5 MG/DL (ref 0–1.2)
BUN SERPL-MCNC: 16 MG/DL (ref 6–23)
CALCIUM SERPL-MCNC: 10.1 MG/DL (ref 8.6–10.6)
CHLORIDE SERPL-SCNC: 108 MMOL/L (ref 98–107)
CO2 SERPL-SCNC: 28 MMOL/L (ref 21–32)
CREAT SERPL-MCNC: 0.81 MG/DL (ref 0.5–1.05)
EGFRCR SERPLBLD CKD-EPI 2021: 76 ML/MIN/1.73M*2
EOSINOPHIL # BLD AUTO: 0 X10*3/UL (ref 0–0.4)
EOSINOPHIL NFR BLD AUTO: 0 %
ERYTHROCYTE [DISTWIDTH] IN BLOOD BY AUTOMATED COUNT: 13.9 % (ref 11.5–14.5)
GLUCOSE SERPL-MCNC: 116 MG/DL (ref 74–99)
HCT VFR BLD AUTO: 44.4 % (ref 36–46)
HGB BLD-MCNC: 15.1 G/DL (ref 12–16)
IMM GRANULOCYTES # BLD AUTO: 0.09 X10*3/UL (ref 0–0.5)
IMM GRANULOCYTES NFR BLD AUTO: 0.8 % (ref 0–0.9)
LYMPHOCYTES # BLD AUTO: 1.27 X10*3/UL (ref 0.8–3)
LYMPHOCYTES NFR BLD AUTO: 10.7 %
MCH RBC QN AUTO: 35 PG (ref 26–34)
MCHC RBC AUTO-ENTMCNC: 34 G/DL (ref 32–36)
MCV RBC AUTO: 103 FL (ref 80–100)
MONOCYTES # BLD AUTO: 0.38 X10*3/UL (ref 0.05–0.8)
MONOCYTES NFR BLD AUTO: 3.2 %
NEUTROPHILS # BLD AUTO: 10.07 X10*3/UL (ref 1.6–5.5)
NEUTROPHILS NFR BLD AUTO: 85.2 %
NRBC BLD-RTO: 0 /100 WBCS (ref 0–0)
PLATELET # BLD AUTO: 317 X10*3/UL (ref 150–450)
POTASSIUM SERPL-SCNC: 5.1 MMOL/L (ref 3.5–5.3)
PROT SERPL-MCNC: 6.6 G/DL (ref 6.4–8.2)
RBC # BLD AUTO: 4.32 X10*6/UL (ref 4–5.2)
SODIUM SERPL-SCNC: 142 MMOL/L (ref 136–145)
TSH SERPL-ACNC: 0.79 MIU/L (ref 0.44–3.98)
WBC # BLD AUTO: 11.8 X10*3/UL (ref 4.4–11.3)

## 2024-08-29 PROCEDURE — 85025 COMPLETE CBC W/AUTO DIFF WBC: CPT

## 2024-08-29 PROCEDURE — 80053 COMPREHEN METABOLIC PANEL: CPT

## 2024-08-29 PROCEDURE — 84443 ASSAY THYROID STIM HORMONE: CPT

## 2024-08-29 PROCEDURE — 36415 COLL VENOUS BLD VENIPUNCTURE: CPT

## 2024-08-30 ENCOUNTER — HOSPITAL ENCOUNTER (OUTPATIENT)
Dept: RADIOLOGY | Facility: CLINIC | Age: 74
Discharge: HOME | End: 2024-08-30
Payer: MEDICARE

## 2024-08-30 ENCOUNTER — OFFICE VISIT (OUTPATIENT)
Dept: ORTHOPEDIC SURGERY | Facility: CLINIC | Age: 74
End: 2024-08-30
Payer: MEDICARE

## 2024-08-30 VITALS — WEIGHT: 258 LBS | BODY MASS INDEX: 39.1 KG/M2 | HEIGHT: 68 IN

## 2024-08-30 DIAGNOSIS — M25.551 RIGHT HIP PAIN: ICD-10-CM

## 2024-08-30 DIAGNOSIS — M76.891 TENDONITIS OF RIGHT HIP: ICD-10-CM

## 2024-08-30 DIAGNOSIS — M16.10 HIP ARTHRITIS: Primary | ICD-10-CM

## 2024-08-30 DIAGNOSIS — M16.10 HIP ARTHRITIS: ICD-10-CM

## 2024-08-30 PROCEDURE — 73503 X-RAY EXAM HIP UNI 4/> VIEWS: CPT | Mod: LT

## 2024-08-30 PROCEDURE — 99214 OFFICE O/P EST MOD 30 MIN: CPT | Performed by: ORTHOPAEDIC SURGERY

## 2024-08-30 RX ORDER — MELOXICAM 15 MG/1
15 TABLET ORAL DAILY
Qty: 30 TABLET | Refills: 11 | Status: SHIPPED | OUTPATIENT
Start: 2024-08-30 | End: 2025-08-30

## 2024-08-30 NOTE — PROGRESS NOTES
Patient is experiencing periodic hip and groin pain was severe last week its gotten a bit better she had a right hip replacement after which she has done well the pain is similar but not as severe  Past medical,family and social histories have been reviewed and are up to date.  All other body systems have been reviewed and are negative for complaint.  Constitutional: Well-developed well-nourished   Eyes: Sclerae anicteric, pupils equal and round  HENT: Normocephalic atraumatic  Cardiovascular: Pulses full, regular rate and rhythm  Respiratory: Breathing not labored, no wheezing  Integumentary: Skin intact, no lesions or rashes  Neurological: Sensation intact, no gross strength deficits, reflexes equal  Psychiatric: Alert oriented and appropriate  Hematologic/lymphatic: No lymphadenopathy  Left leg: She has minor rotational loss but hurts with internal rotation she has an antalgic gait  X-rays show moderately advanced left hip arthritis assessment hip arthritis  Weight loss meloxicam discussed indications for hip replacement surgery  All questions answered

## 2024-09-04 ENCOUNTER — OFFICE VISIT (OUTPATIENT)
Dept: HEMATOLOGY/ONCOLOGY | Facility: CLINIC | Age: 74
End: 2024-09-04
Payer: MEDICARE

## 2024-09-04 VITALS
WEIGHT: 262.02 LBS | HEART RATE: 50 BPM | DIASTOLIC BLOOD PRESSURE: 74 MMHG | SYSTOLIC BLOOD PRESSURE: 127 MMHG | OXYGEN SATURATION: 95 % | TEMPERATURE: 97.5 F | RESPIRATION RATE: 18 BRPM | BODY MASS INDEX: 39.84 KG/M2

## 2024-09-04 DIAGNOSIS — D45 POLYCYTHEMIA VERA (MULTI): ICD-10-CM

## 2024-09-04 PROCEDURE — 3078F DIAST BP <80 MM HG: CPT | Performed by: STUDENT IN AN ORGANIZED HEALTH CARE EDUCATION/TRAINING PROGRAM

## 2024-09-04 PROCEDURE — 3074F SYST BP LT 130 MM HG: CPT | Performed by: STUDENT IN AN ORGANIZED HEALTH CARE EDUCATION/TRAINING PROGRAM

## 2024-09-04 PROCEDURE — 99214 OFFICE O/P EST MOD 30 MIN: CPT | Performed by: STUDENT IN AN ORGANIZED HEALTH CARE EDUCATION/TRAINING PROGRAM

## 2024-09-04 PROCEDURE — 1159F MED LIST DOCD IN RCRD: CPT | Performed by: STUDENT IN AN ORGANIZED HEALTH CARE EDUCATION/TRAINING PROGRAM

## 2024-09-04 PROCEDURE — 1126F AMNT PAIN NOTED NONE PRSNT: CPT | Performed by: STUDENT IN AN ORGANIZED HEALTH CARE EDUCATION/TRAINING PROGRAM

## 2024-09-04 PROCEDURE — 1036F TOBACCO NON-USER: CPT | Performed by: STUDENT IN AN ORGANIZED HEALTH CARE EDUCATION/TRAINING PROGRAM

## 2024-09-04 ASSESSMENT — ENCOUNTER SYMPTOMS
OCCASIONAL FEELINGS OF UNSTEADINESS: 0
LOSS OF SENSATION IN FEET: 0
DEPRESSION: 0

## 2024-09-04 ASSESSMENT — PAIN SCALES - GENERAL: PAINLEVEL: 0-NO PAIN

## 2024-09-23 ENCOUNTER — TELEPHONE (OUTPATIENT)
Dept: ADMISSION | Facility: HOSPITAL | Age: 74
End: 2024-09-23
Payer: MEDICARE

## 2024-09-23 DIAGNOSIS — D45 POLYCYTHEMIA VERA: ICD-10-CM

## 2024-09-23 RX ORDER — HYDROXYUREA 500 MG/1
CAPSULE ORAL
Qty: 192 CAPSULE | Refills: 2 | Status: SHIPPED | OUTPATIENT
Start: 2024-09-23

## 2024-09-23 NOTE — TELEPHONE ENCOUNTER
Pt is requesting a refill of hydroxyurea 500mg 2 caps daily except for Wednesdays 3 caps.  Preferred pharmacy is [facility].

## 2024-10-15 ENCOUNTER — TELEMEDICINE (OUTPATIENT)
Dept: UROLOGY | Facility: CLINIC | Age: 74
End: 2024-10-15
Payer: MEDICARE

## 2024-10-15 DIAGNOSIS — N95.2 VAGINAL ATROPHY: ICD-10-CM

## 2024-10-15 DIAGNOSIS — N32.81 OAB (OVERACTIVE BLADDER): ICD-10-CM

## 2024-10-15 DIAGNOSIS — K59.01 SLOW TRANSIT CONSTIPATION: ICD-10-CM

## 2024-10-15 DIAGNOSIS — N81.89 PELVIC FLOOR WEAKNESS: ICD-10-CM

## 2024-10-15 DIAGNOSIS — N39.41 URGE URINARY INCONTINENCE: Primary | ICD-10-CM

## 2024-10-15 DIAGNOSIS — R39.15 URINARY URGENCY: ICD-10-CM

## 2024-10-15 PROCEDURE — 1160F RVW MEDS BY RX/DR IN RCRD: CPT | Performed by: OBSTETRICS & GYNECOLOGY

## 2024-10-15 PROCEDURE — 1036F TOBACCO NON-USER: CPT | Performed by: OBSTETRICS & GYNECOLOGY

## 2024-10-15 PROCEDURE — 99441 PR PHYS/QHP TELEPHONE EVALUATION 5-10 MIN: CPT | Performed by: OBSTETRICS & GYNECOLOGY

## 2024-10-15 PROCEDURE — 1159F MED LIST DOCD IN RCRD: CPT | Performed by: OBSTETRICS & GYNECOLOGY

## 2024-10-15 RX ORDER — OXYBUTYNIN CHLORIDE 10 MG/1
10 TABLET, EXTENDED RELEASE ORAL 2 TIMES DAILY
Qty: 180 TABLET | Refills: 3 | Status: SHIPPED | OUTPATIENT
Start: 2024-10-15

## 2024-10-15 NOTE — PROGRESS NOTES
Subjective   Patient ID: Ileana Glover is a 74 y.o. female who presents for follow up  Virtual or Telephone Consent    A telephone visit (audio only) between the patient (at the originating site) and the provider (at the distant site) was utilized to provide this telehealth service.   Verbal consent was requested and obtained from Ileana Glover on this date, 10/15/24 for a telehealth visit.    10 minutes were spent discussing the patients concerns and coordinating care    HPI  This visit was performed through telemedicine  74-year-old with urge or near incontinence, pelvic floor weakness, vaginal atrophy, and mild constipation.    The patient has been utilizing her Oxybutynin therapy 10 mg twice daily with excellent results. She does note  dry mouth complaints and utilizing lozenges but no constipation complaints with this medication. She is satisfied with her current therapy. She denies any UTI like symptoms.    She denies any vaginal complaints, no abnormal bleeding or discharge. She is utilizing the vaginal estrogen cream.     She denies any bowel related complaints, no fecal or flatal incontinence.    She has no other complaints.      From Previous note  74-year-old with urge or near incontinence, pelvic floor weakness, vaginal atrophy, and mild constipation.    The patient states that Gemtesa was cost prohibitive at $500 hence she could not start the medication but is continuing her oxybutynin medication which has showed her improvement. She has post COVID dry mouth but denies any constipation complaints with this medication.  She is consuming excessive water due to her hydroxyurea, she is noting increased frequency about 7-8 times a day. She notes 1-2 episodes of nocturia but denies enuresis. She is unable to provide a urine sample today, Her PVR is 32 ml.     She denies any vaginal complaints, no abnormal bleeding or discharge. She is utilizing the vaginal estrogen cream.     She denies any bowel related  complaints, no fecal or flatal incontinence.    She has no other complaints.  From Previous note  74-year-old with urge or near incontinence, pelvic floor weakness, vaginal atrophy, and mild constipation.    The patient is utilizing her  estrogen cream and taking her oxybutynin medication which has showed her improvement.  She is consuming excessive water due to her hydroxyurea, she is noting increased frequency about 7-8 times a day. She notes 1-2 episodes of nocturia but denies enuresis. She is unable to provide a urine sample today, Her PVR is 32 ml.     She denies any vaginal complaints, no abnormal bleeding or discharge.     She denies any bowel related complaints, no fecal or flatal incontinence.    She has no other complaints.      From Previous note  73-year-old with urge or near incontinence, pelvic floor weakness, vaginal atrophy, and mild constipation.     She notes 2 accidents but attributes this to her waiting too long to go to the bathroom. She is still using her estrogen cream and taking her oxybutynin medication which has showed her improvement. She is overall very satisfied with her present therapy.     She is proceeding with a hip replacement and knee replacement in the next few months.     She has no other complaints.      From previous note  72-year-old with urge or near incontinence, pelvic floor weakness, vaginal atrophy, and mild constipation     Patient is extremely satisfied with her present oxybutynin and vaginal estrogen therapy. She has noted near complete resolution of her nocturia complaints as well as improved daytime urgency and frequency. She denies any urge related incontinence complaints on the oxybutynin as well. She denies any significant dry mouth complaints or constipation. She is overall very satisfied with the therapy.     She is also utilizing her vaginal estrogen therapy.     She has no other complaints.     From previous note  72-year-old presenting as a referral from   "[place] with urinary incontinence complaints.     The patient has a roughly 2 to 3-year history of worsening urge urinary incontinence complaints. She denies any significant frequency but notes significant urge and urge related incontinence during the daytime. She also notes 2-3 episodes of nocturia. She denies enuresis. She does note rare stress urinary incontinence but this is typically associated when she is \"tired\". She also denies a history of nephrolithiasis, chronic urinary tract infections, or any gross hematuria.     She denies any vaginal complaints. She denies any abnormal vaginal bleeding or discharge. She is not sexually active. She denies any prolapse complaints.     She does have episodic constipation and utilizes Metamucil for this daily. She denies any fecal or flatal incontinence.     She has no other complaints.        Review of Systems  Constitutional: No fever, No chills and No fatigue.   Eyes: No vision problems and No dryness of the eyes.   ENT: No dry mouth, No hearing loss and No nosebleeds.   Cardiovascular: No chest pain, No palpitations and No orthopnea.   Respiratory: No shortness of breath, No cough and No wheezing.   Gastrointestinal: No abdominal pain, No constipation, No nausea, No diarrhea, No vomiting and No melena.   Genitourinary: As noted in HPI.   Musculoskeletal: No back pain, No myalgias, No muscle weakness, No joint swelling and No leg edema.   Integumentary: No rashes, No skin lesion and No itching.   Neurological: No headache, No numbness and No dizziness.   Psychiatric: No sleep disturbances, No anxiety and No depression.   Endocrine: No hot flashes, No loss of hair and No hirsutism.   Hematologic/Lymphatic: No swollen glands, No tendency for easy bleeding and No tendency for easy bruising.   All other systems have been reviewed and are negative for complaint.        Objective   Physical Exam  This visit was performed through telemedicine     Assessment/Plan   74-year-old " with urge or near incontinence, pelvic floor weakness, vaginal atrophy, and mild constipation.     1. We again discussed the patient's urge urinary incontinence complaints.  She has needed to increase her water intake due to her hydroxyurea diagnosis and has noted worsening urinary urgency and frequency concerns.  She was previously having excellent results with her oxybutynin therapy.  Unfortunately Gemtesa therapy was cost prohibitive at $500 for 30-day supply.  She appears to be having excellent results with her 10 mg oxybutynin twice daily.  She does note some dry mouth complaints and we discussed using sugarless lozenges, sugarless gum, and Biotene.  A new 90-day supply was called into her mail order pharmacy.  We also discussed third line options and proceeding with 100 units Botox should she continue to have bothersome urinary symptoms moving forward.  She is overall very satisfied with her oxybutynin therapy.    2. We discussed the patient's mild pelvic floor weakness. She is not interested in pelvic floor physical therapy at this time.     3. We again discussed the patient's vaginal atrophy. We again discussed the safety, efficacy, and proper utilization of vaginal estrogen therapy. She will continue this 3 times a week moving forward.     4. We have previously discussed the patient's mild constipation complaints. She will continue her daily Metamucil. We discussed titrating MiraLAX to a soft bowel movement every day to 2 days.      5.  The patient will follow-up yearly moving forward.     EMERY Salazar MD       Scribe Attestation  By signing my name below, ISoheila Scribe attest that this documentation has been prepared under the direction and in the presence of Shawn Salazar MD. All medical record entries made by the Scribe were at my direction or personally dictated by me. I have reviewed the chart and agree that the record accurately reflects my personal performance of the history,  physical exam, discussion and plan.

## 2024-10-16 ENCOUNTER — APPOINTMENT (OUTPATIENT)
Dept: ORTHOPEDIC SURGERY | Facility: CLINIC | Age: 74
End: 2024-10-16
Payer: MEDICARE

## 2024-10-21 ENCOUNTER — TELEPHONE (OUTPATIENT)
Dept: OBSTETRICS AND GYNECOLOGY | Facility: CLINIC | Age: 74
End: 2024-10-21
Payer: MEDICARE

## 2024-10-21 DIAGNOSIS — Z12.31 ENCOUNTER FOR SCREENING MAMMOGRAM FOR MALIGNANT NEOPLASM OF BREAST: ICD-10-CM

## 2024-10-21 NOTE — TELEPHONE ENCOUNTER
RN returned Patient call, verified by name and   Patient notified that screening mammogram order was placed  Patient verbalizes understanding  Dinora Douglas RN

## 2024-10-21 NOTE — TELEPHONE ENCOUNTER
Patient has an appointment next month for annual.  She is requesting Routine Mammogram order placed     There is a [facility] [employer] message stating the order was already placed, but I do not see active order on file.

## 2024-10-22 ENCOUNTER — APPOINTMENT (OUTPATIENT)
Dept: ORTHOPEDIC SURGERY | Facility: CLINIC | Age: 74
End: 2024-10-22
Payer: MEDICARE

## 2024-10-22 DIAGNOSIS — M16.10 HIP ARTHRITIS: ICD-10-CM

## 2024-10-22 PROCEDURE — 99213 OFFICE O/P EST LOW 20 MIN: CPT | Performed by: ORTHOPAEDIC SURGERY

## 2024-10-22 PROCEDURE — 1159F MED LIST DOCD IN RCRD: CPT | Performed by: ORTHOPAEDIC SURGERY

## 2024-10-22 PROCEDURE — 1036F TOBACCO NON-USER: CPT | Performed by: ORTHOPAEDIC SURGERY

## 2024-10-22 NOTE — PROGRESS NOTES
Chief Complaint   Chief Complaint   Patient presents with    Left Hip - Pain    Left Knee - Pain         HPI:      Ileana Glover is a pleasant 74 y.o. year-old female who is seen today for progressive left hip groin and thigh pain  Known history of arthritis she has been on meloxicam without relief she has been doing physical therapy exercises without relief as well similar to pain she had a right hip prior to arthroplasty      Review of Systems all other body systems have been reviewed and are negative for complaint.    There were no vitals filed for this visit.    Past Medical History:   Diagnosis Date    Essential (primary) hypertension 10/26/2022    Hypertension    Hypothyroidism, unspecified 10/26/2022    Hypothyroidism    Personal history of other diseases of the respiratory system 10/21/2022    History of acute pharyngitis    Pure hypercholesterolemia, unspecified 08/12/2014    High cholesterol     Patient Active Problem List   Diagnosis    Obesity    JAK2 positive polycythemia vera (Multi)    Hyperlipidemia    Allergic contact dermatitis due to plants, except food    Arthritis, midfoot    Internal hemorrhoids    BRBPR (bright red blood per rectum)    Carpal tunnel syndrome    Cervical radiculopathy at C6    Contracture of Achilles tendon    Distal radius fracture, left    Hemangioma of skin and subcutaneous tissue    Hypercalcemia    Hyperkalemia    Hypertension    Hypothyroidism    Inconclusive mammogram    Melanocytic nevi of scalp and neck    Osteoarthritis    Pelvic floor weakness    Peroneal tendon tear    Rash and nonspecific skin eruption    Skin changes due to chronic exposure to nonionizing radiation, unspecified    Thrombocytosis    GERD (gastroesophageal reflux disease)    Age-related osteoporosis without current pathological fracture    Unspecified rotator cuff tear or rupture of left shoulder, not specified as traumatic    Rectal hemorrhage    Swelling of lower extremity    Thumb pain     Productive cough    Polyp of colon    History of colonic polyps    History of repair of hip joint    Low back pain with sciatica    Osteopenia    Overactive bladder    Erythrocytosis    Diverticulosis of large intestine    Disorder of ankle    Constipation    Cellulitis    Bilateral primary osteoarthritis of knee    Benign neoplasm of rectum    Benign neoplasm of transverse colon    Benign neoplasm of cecum    Benign neoplasm of ascending colon    Atrophic vaginitis    Arthralgia of shoulder    Arthralgia of knee    Pain of right lower extremity    Arthralgia of hip    Pain of foot    Acute pharyngitis    Acute bacterial bronchitis    Vertigo    Urinary incontinence    Urinary urgency    Paresis of single lower extremity (Multi)    Fatigue    Headache    Xerosis cutis    Bleeding hemorrhoids    Rash    Skin lesion    Gastroesophageal reflux disease    Fracture of distal end of radius    Arthritis of foot    Derangement of knee    Arthritis of knee    Allergic contact dermatitis due to plant    Presence of left artificial shoulder joint    Melanocytic nevi of left upper limb, including shoulder    Other specified disorders of bone density and structure, unspecified site    Pain in left hip    Personal history of nicotine dependence    Other seborrheic keratosis    Pain in right shoulder    Other long term (current) drug therapy    Pain in left shoulder    Melanocytic nevi of other parts of face    Obesity, unspecified    Low back pain, unspecified    Contact with and (suspected) exposure to covid-19    Genetic susceptibility to other disease    Long term (current) use of aspirin    Bradycardia, unspecified    Bloody stool    Knee pain    Pain in right hip    Melanocytic nevi of trunk    Melanocytic nevi of unspecified upper limb, including shoulder    Pain in right lower leg    Other spondylosis with radiculopathy, cervical region    Obesity with body mass index 30 or greater    Severe obesity (Multi)    Pelvic floor  dysfunction    Plantar fasciitis    Injury of tendon of lower extremity    Thrombocythemia    Melanocytic nevus of skin       Medication Documentation Review Audit       Reviewed by Lewis Xavier MA (Medical Assistant) on 10/22/24 at 1342      Medication Order Taking? Sig Documenting Provider Last Dose Status   acetaminophen (Tylenol 8 HOUR) 650 mg ER tablet [id number] No Take 2 tablets (1,300 mg) by mouth every 8 hours if needed. Historical Provider, MD Taking Active   aspirin 81 mg EC tablet [id number] No Take 1 tablet (81 mg) by mouth once daily. Historical Provider, MD Taking Active   calcium carbonate-vitamin D3 500 mg-3.125 mcg (125 unit) tablet tablet [id number] No Take by mouth. Historical Provider, MD Taking Active   CALCIUM CITRATE-VITAMIN D3 ORAL [id number] No Take by mouth. Historical Provider, MD Taking Active   diclofenac sodium (Voltaren Arthritis Pain) 1 % gel [id number] No Apply 4.5 inches (4 g) topically 4 times a day as needed (midfoot pain). Anup Barrientos MD Taking Active   estradiol (Estrace) 0.01 % (0.1 mg/gram) vaginal cream [id number] No Please use a dime sized or pea-sized amount vaginally 3 times a week. Shawn Salazar MD Taking Active   fluticasone (Flonase) 50 mcg/actuation nasal spray [id number] No Administer 1 spray into each nostril once daily. Shake gently. Before first use, prime pump. After use, clean tip and replace cap.   Patient taking differently: Administer into each nostril once daily as needed for allergies. Shake gently. Before first use, prime pump. After use, clean tip and replace cap.    Wyatt Genao DO Taking Active   hydrocortisone 2.5 % cream [id number] No once daily. Historical Provider, MD Not Taking Active   hydroxyurea (Hydrea) 500 mg capsule [id number]  2 capsules PO every day with food - Except on Wednesdays take 3 caps PO on Wednesdays Gregg Robles PA-C  Active    mg tablet [id number] No TAKE ONE TABLET BY MOUTH EVERY 8 HOURS with food or milk AS  NEEDED Historical Provider, MD Taking Active   levothyroxine (Synthroid, Levoxyl) 150 mcg tablet [id number] No Take 1 tablet (150 mcg) by mouth once daily in the morning. Take before meals. Ghassan Bush MD Taking Active   meloxicam (Mobic) 15 mg tablet [id number] No TAKE ONE TABLET BY MOUTH EVERY DAY   Patient not taking: Reported on 2024    Maurizio Nelson MD Not Taking Active   meloxicam (Mobic) 15 mg tablet [id number] No Take 1 tablet (15 mg) by mouth once daily. Maurizio Nelson MD Taking Active   metoprolol succinate XL (Toprol-XL) 50 mg 24 hr tablet [id number] No Take 1 tablet (50 mg) by mouth once daily. Do not crush or chew. Ghassan Bush MD Taking Active   olmesartan (Benicar) 5 mg tablet [id number] No Take 2 tablets (10 mg) by mouth once daily. Ghassan Bush MD Taking  10/20/24 2351   ORTHOVISC 30 mg/2 mL injection [id number] No Inject into the joint. Historical Provider, MD Taking Active   oxybutynin XL (Ditropan-XL) 10 mg 24 hr tablet [id number]  Take 1 tablet (10 mg) by mouth 2 times a day. Shawn Salazar MD  Active   polyethylene glycol-electrolytes 420 gram solution [id number] No Take by mouth. Historical Provider, MD Taking Active   simvastatin (Zocor) 20 mg tablet [id number] No Take 1 tablet (20 mg) by mouth once daily. Ghassan Bush MD Taking Active   triamcinolone (Kenalog) 0.1 % cream [id number] No 0 Historical Provider, MD Not Taking Active   vibegron 75 mg tablet [id number] No Take 1 tablet (75 mg) by mouth once daily.   Patient not taking: Reported on 2024    Shawn Salazar MD Not Taking Active                    Allergies   Allergen Reactions    Sulfa (Sulfonamide Antibiotics) Rash       Social History     Socioeconomic History    Marital status:      Spouse name: Not on file    Number of children: Not on file    Years of education: Not on file    Highest education level: Not on file   Occupational History    Not on file   Tobacco Use    Smoking status:  Former     Types: Cigarettes    Smokeless tobacco: Never   Vaping Use    Vaping status: Never Used   Substance and Sexual Activity    Alcohol use: Yes     Comment: social    Drug use: Not Currently    Sexual activity: Never   Other Topics Concern    Not on file   Social History Narrative    Not on file     Social Drivers of Health     Financial Resource Strain: Not on file   Food Insecurity: Not on file   Transportation Needs: Not on file   Physical Activity: Not on file   Stress: Not on file   Social Connections: Not on file   Intimate Partner Violence: Not on file   Housing Stability: Not on file       Past Surgical History:   Procedure Laterality Date    KNEE ARTHROSCOPY W/ DEBRIDEMENT  12/10/2013    Arthroscopy Knee Left    OOPHORECTOMY  08/12/2014    Oophorectomy - Unilateral (Removal Of One Ovary)    OTHER SURGICAL HISTORY  03/08/2022    Tonsillectomy    OTHER SURGICAL HISTORY  01/30/2017    Wrist Surgery       There is no height or weight on file to calculate BMI.    HgA1c:   Lab Results   Component Value Date    HGBA1C 5.2 09/24/2021    FLLREUUD6P 103 09/24/2021       Physical Exam:  Constitutional: Well-developed well-nourished   Eyes: Sclerae anicteric, pupils equal and round  HENT: Normocephalic atraumatic  Cardiovascular: Pulses full, regular rate and rhythm  Respiratory: Breathing not labored, no wheezing  Integumentary: Skin intact, no lesions or rashes  Neurological: Sensation intact, no gross strength deficits, reflexes equal  Psychiatric: Alert oriented and appropriate  Hematologic/lymphatic: No lymphadenopathy  Left hip rotation restricted particular internal rotation with reproduction of pain, antalgic gait          Imaging:  X-rays of the hip from couple months ago show advanced arthritis of the left hip hip arthritis  She like to try a injection and referred her for this we discussed the natural history of arthritis impact of activity modification and weight loss        Impression/Plan:  Will  provide referral to sports medicine for injection, consider hip replacement in the future

## 2024-11-12 ENCOUNTER — OFFICE VISIT (OUTPATIENT)
Dept: OBSTETRICS AND GYNECOLOGY | Facility: CLINIC | Age: 74
End: 2024-11-12
Payer: MEDICARE

## 2024-11-12 VITALS — SYSTOLIC BLOOD PRESSURE: 156 MMHG | WEIGHT: 267.6 LBS | DIASTOLIC BLOOD PRESSURE: 73 MMHG | BODY MASS INDEX: 40.69 KG/M2

## 2024-11-12 DIAGNOSIS — Z01.419 ENCOUNTER FOR WELL WOMAN EXAM WITH ROUTINE GYNECOLOGICAL EXAM: ICD-10-CM

## 2024-11-12 PROCEDURE — 3077F SYST BP >= 140 MM HG: CPT | Performed by: OBSTETRICS & GYNECOLOGY

## 2024-11-12 PROCEDURE — 3078F DIAST BP <80 MM HG: CPT | Performed by: OBSTETRICS & GYNECOLOGY

## 2024-11-12 PROCEDURE — 1159F MED LIST DOCD IN RCRD: CPT | Performed by: OBSTETRICS & GYNECOLOGY

## 2024-11-12 PROCEDURE — 99397 PER PM REEVAL EST PAT 65+ YR: CPT | Performed by: OBSTETRICS & GYNECOLOGY

## 2024-11-12 PROCEDURE — 1036F TOBACCO NON-USER: CPT | Performed by: OBSTETRICS & GYNECOLOGY

## 2024-11-12 PROCEDURE — 1160F RVW MEDS BY RX/DR IN RCRD: CPT | Performed by: OBSTETRICS & GYNECOLOGY

## 2024-11-12 PROCEDURE — 1126F AMNT PAIN NOTED NONE PRSNT: CPT | Performed by: OBSTETRICS & GYNECOLOGY

## 2024-11-12 RX ORDER — AMOXICILLIN 500 MG/1
500 CAPSULE ORAL EVERY 12 HOURS SCHEDULED
COMMUNITY

## 2024-11-12 RX ORDER — AZITHROMYCIN 250 MG/1
250 TABLET, FILM COATED ORAL DAILY
COMMUNITY

## 2024-11-12 ASSESSMENT — ENCOUNTER SYMPTOMS
MUSCULOSKELETAL NEGATIVE: 0
RESPIRATORY NEGATIVE: 0
GASTROINTESTINAL NEGATIVE: 0
EYES NEGATIVE: 0
HEMATOLOGIC/LYMPHATIC NEGATIVE: 0
NEUROLOGICAL NEGATIVE: 0
CONSTITUTIONAL NEGATIVE: 0
PSYCHIATRIC NEGATIVE: 0
ALLERGIC/IMMUNOLOGIC NEGATIVE: 0
ENDOCRINE NEGATIVE: 0
CARDIOVASCULAR NEGATIVE: 0

## 2024-11-12 ASSESSMENT — PAIN SCALES - GENERAL: PAINLEVEL_OUTOF10: 0-NO PAIN

## 2024-11-12 NOTE — PROGRESS NOTES
Ileana Glover is a 74 y.o. female who is here for a routine exam. PCP = Ghassan Bush MD  For annual exam no new complaints has problems with her left hip.  Status post right hip replacement also status post right shoulder replacement.  Left shoulder pain.  Currently scheduled for injections.  Has not been sexually active in many years    Review of Systems  Musculoskeletal pain especially left hip and shoulder.  Other systems negative    Physical Exam  Constitutional:       Appearance: Normal appearance. She is obese.   Genitourinary:      Genitourinary Comments: External genitalia unremarkable  Vagina clear  Cervix closed uterus difficult to palpate but does not seem enlarged  No adnexal masses or tenderness palpated  Perineum without lesions or swelling  Patient discharged.   Breasts:     Breasts are soft.     Right: Normal.      Left: Normal.   HENT:      Head: Normocephalic.      Nose: Nose normal.   Eyes:      Pupils: Pupils are equal, round, and reactive to light.   Cardiovascular:      Rate and Rhythm: Normal rate and regular rhythm.   Pulmonary:      Effort: Pulmonary effort is normal.      Breath sounds: Normal breath sounds.   Abdominal:      General: Abdomen is flat. Bowel sounds are normal.      Palpations: Abdomen is soft.   Musculoskeletal:         General: Normal range of motion.      Cervical back: Normal range of motion and neck supple.   Neurological:      General: No focal deficit present.      Mental Status: She is alert.   Skin:     General: Skin is warm and dry.   Psychiatric:         Mood and Affect: Mood normal.         Behavior: Behavior normal.         Thought Content: Thought content normal.         Judgment: Judgment normal.     Objective   /73   Wt 121 kg (267 lb 9.6 oz)   BMI 40.69 kg/m²   OB History    No obstetric history on file.          GynHx:  Menarche/Menopause: [age]    Social History     Substance and Sexual Activity   Sexual Activity Never    Birth  control/protection: None     STIs: none    Substance:   Tobacco Use: Medium Risk (11/12/2024)    Patient History     Smoking Tobacco Use: Former     Smokeless Tobacco Use: Never     Passive Exposure: Not on file      Social History     Substance and Sexual Activity   Drug Use Not Currently      Social History     Substance and Sexual Activity   Alcohol Use Yes    Comment: social     Abuse: No  Depression Screen:   Denies issues with depression    Past med hx and past surg hx reviewed     Assessment/Plan    Clinically unremarkable GYN exam.  Patient not been sexually active in many years.  STD testing not done.  Discussed diet exercise calcium and vitamin D.  Mammogram ordered and scheduled bone density done 2 years ago.  Return to office in 1 year or as needed

## 2024-11-14 ENCOUNTER — HOSPITAL ENCOUNTER (OUTPATIENT)
Dept: RADIOLOGY | Facility: CLINIC | Age: 74
Discharge: HOME | End: 2024-11-14
Payer: MEDICARE

## 2024-11-14 VITALS — BODY MASS INDEX: 40.47 KG/M2 | HEIGHT: 68 IN | WEIGHT: 267 LBS

## 2024-11-14 DIAGNOSIS — Z12.31 ENCOUNTER FOR SCREENING MAMMOGRAM FOR MALIGNANT NEOPLASM OF BREAST: ICD-10-CM

## 2024-11-14 PROCEDURE — 77067 SCR MAMMO BI INCL CAD: CPT

## 2024-12-02 ENCOUNTER — LAB (OUTPATIENT)
Dept: LAB | Facility: LAB | Age: 74
End: 2024-12-02
Payer: MEDICARE

## 2024-12-02 DIAGNOSIS — D45 POLYCYTHEMIA VERA: ICD-10-CM

## 2024-12-02 LAB
ALBUMIN SERPL BCP-MCNC: 4.2 G/DL (ref 3.4–5)
ALP SERPL-CCNC: 96 U/L (ref 33–136)
ALT SERPL W P-5'-P-CCNC: 26 U/L (ref 7–45)
ANION GAP SERPL CALC-SCNC: 11 MMOL/L (ref 10–20)
AST SERPL W P-5'-P-CCNC: 26 U/L (ref 9–39)
BASOPHILS # BLD AUTO: 0.05 X10*3/UL (ref 0–0.1)
BASOPHILS NFR BLD AUTO: 0.8 %
BILIRUB SERPL-MCNC: 0.8 MG/DL (ref 0–1.2)
BUN SERPL-MCNC: 20 MG/DL (ref 6–23)
CALCIUM SERPL-MCNC: 9.5 MG/DL (ref 8.6–10.6)
CHLORIDE SERPL-SCNC: 105 MMOL/L (ref 98–107)
CO2 SERPL-SCNC: 28 MMOL/L (ref 21–32)
CREAT SERPL-MCNC: 0.81 MG/DL (ref 0.5–1.05)
EGFRCR SERPLBLD CKD-EPI 2021: 76 ML/MIN/1.73M*2
EOSINOPHIL # BLD AUTO: 0.14 X10*3/UL (ref 0–0.4)
EOSINOPHIL NFR BLD AUTO: 2.1 %
ERYTHROCYTE [DISTWIDTH] IN BLOOD BY AUTOMATED COUNT: 13.9 % (ref 11.5–14.5)
GLUCOSE SERPL-MCNC: 95 MG/DL (ref 74–99)
HCT VFR BLD AUTO: 45 % (ref 36–46)
HGB BLD-MCNC: 15.1 G/DL (ref 12–16)
IMM GRANULOCYTES # BLD AUTO: 0.02 X10*3/UL (ref 0–0.5)
IMM GRANULOCYTES NFR BLD AUTO: 0.3 % (ref 0–0.9)
LYMPHOCYTES # BLD AUTO: 1.83 X10*3/UL (ref 0.8–3)
LYMPHOCYTES NFR BLD AUTO: 28 %
MCH RBC QN AUTO: 34.3 PG (ref 26–34)
MCHC RBC AUTO-ENTMCNC: 33.6 G/DL (ref 32–36)
MCV RBC AUTO: 102 FL (ref 80–100)
MONOCYTES # BLD AUTO: 0.43 X10*3/UL (ref 0.05–0.8)
MONOCYTES NFR BLD AUTO: 6.6 %
NEUTROPHILS # BLD AUTO: 4.06 X10*3/UL (ref 1.6–5.5)
NEUTROPHILS NFR BLD AUTO: 62.2 %
NRBC BLD-RTO: 0 /100 WBCS (ref 0–0)
PLATELET # BLD AUTO: 269 X10*3/UL (ref 150–450)
POTASSIUM SERPL-SCNC: 4.7 MMOL/L (ref 3.5–5.3)
PROT SERPL-MCNC: 6.7 G/DL (ref 6.4–8.2)
RBC # BLD AUTO: 4.4 X10*6/UL (ref 4–5.2)
SODIUM SERPL-SCNC: 139 MMOL/L (ref 136–145)
WBC # BLD AUTO: 6.5 X10*3/UL (ref 4.4–11.3)

## 2024-12-02 PROCEDURE — 36415 COLL VENOUS BLD VENIPUNCTURE: CPT

## 2024-12-02 PROCEDURE — 82668 ASSAY OF ERYTHROPOIETIN: CPT

## 2024-12-02 PROCEDURE — 85025 COMPLETE CBC W/AUTO DIFF WBC: CPT

## 2024-12-02 PROCEDURE — 80053 COMPREHEN METABOLIC PANEL: CPT

## 2024-12-04 ENCOUNTER — OFFICE VISIT (OUTPATIENT)
Dept: HEMATOLOGY/ONCOLOGY | Facility: CLINIC | Age: 74
End: 2024-12-04
Payer: MEDICARE

## 2024-12-04 VITALS
SYSTOLIC BLOOD PRESSURE: 142 MMHG | DIASTOLIC BLOOD PRESSURE: 75 MMHG | RESPIRATION RATE: 18 BRPM | HEART RATE: 52 BPM | TEMPERATURE: 96.6 F

## 2024-12-04 DIAGNOSIS — D45 POLYCYTHEMIA VERA: ICD-10-CM

## 2024-12-04 DIAGNOSIS — D45 JAK2 POSITIVE POLYCYTHEMIA VERA (MULTI): ICD-10-CM

## 2024-12-04 LAB — EPO SERPL-ACNC: 8 MU/ML (ref 4–27)

## 2024-12-04 PROCEDURE — G2211 COMPLEX E/M VISIT ADD ON: HCPCS | Performed by: STUDENT IN AN ORGANIZED HEALTH CARE EDUCATION/TRAINING PROGRAM

## 2024-12-04 PROCEDURE — 1036F TOBACCO NON-USER: CPT | Performed by: STUDENT IN AN ORGANIZED HEALTH CARE EDUCATION/TRAINING PROGRAM

## 2024-12-04 PROCEDURE — 3078F DIAST BP <80 MM HG: CPT | Performed by: STUDENT IN AN ORGANIZED HEALTH CARE EDUCATION/TRAINING PROGRAM

## 2024-12-04 PROCEDURE — 99214 OFFICE O/P EST MOD 30 MIN: CPT | Performed by: STUDENT IN AN ORGANIZED HEALTH CARE EDUCATION/TRAINING PROGRAM

## 2024-12-04 PROCEDURE — 1160F RVW MEDS BY RX/DR IN RCRD: CPT | Performed by: STUDENT IN AN ORGANIZED HEALTH CARE EDUCATION/TRAINING PROGRAM

## 2024-12-04 PROCEDURE — 3077F SYST BP >= 140 MM HG: CPT | Performed by: STUDENT IN AN ORGANIZED HEALTH CARE EDUCATION/TRAINING PROGRAM

## 2024-12-04 PROCEDURE — 1125F AMNT PAIN NOTED PAIN PRSNT: CPT | Performed by: STUDENT IN AN ORGANIZED HEALTH CARE EDUCATION/TRAINING PROGRAM

## 2024-12-04 PROCEDURE — 1159F MED LIST DOCD IN RCRD: CPT | Performed by: STUDENT IN AN ORGANIZED HEALTH CARE EDUCATION/TRAINING PROGRAM

## 2024-12-04 RX ORDER — FOLIC ACID 1 MG/1
1 TABLET ORAL DAILY
Qty: 90 TABLET | Refills: 3 | Status: SHIPPED | OUTPATIENT
Start: 2024-12-04

## 2024-12-04 ASSESSMENT — ENCOUNTER SYMPTOMS
LOSS OF SENSATION IN FEET: 0
OCCASIONAL FEELINGS OF UNSTEADINESS: 0
DEPRESSION: 0

## 2024-12-04 ASSESSMENT — PAIN SCALES - GENERAL: PAINLEVEL_OUTOF10: 6

## 2024-12-04 NOTE — PROGRESS NOTES
Patient ID: Ileana Glover is a 74 y.o. female.  Referring Physician: No referring provider defined for this encounter.  Primary Care Provider: Ghassan Bush MD  Visit Type: Follow Up  Diagnosis: JAK2 +ve PV     Therapy summary (diagnosis: JAK2 +ve PV):   hydrea: 1000mg daily- (+500mg weekly, 6/5/2024)    Subjective    HPI  74 y.o. 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   Previously followed by Alessandra Tripp NP, previously followed with .    BMBX 5/2020 revealed  which showed GIRISH-2 positive mutation and was diagnosed with Polycythemia Vera in conjunction with erythrocytosis. Currently asymptomatic and no hx VTE.   Did not have phlebotomies as she does not like needles. Currently on hydrea 1000mg daily, dose last increased about a year back.   Has gained 20lbs in a couple months. Has not missed any doses. Does not note h/o ARIELLE/OHS.   No supplements. Has been adherent on her medications.   Age appropriate cancer screening: last colo 1yr back WNL, mammo 1/24 WNL. No p/h/o cancer.   FMH: father had a stroke at 76yrs   Social history: never smoker, occasionally drinks, no RDA.   06/05/24: presents alone. taking 1000mg daily. Asymptomatic. Not had phleb in recent past. Ongoing weight loss 170-->163lbs  09/04/24: Continues to report episodes of falling asleep while watching TV occasionally while drinking coffee.  Her PCP is suspicious for sleep apnea.  Notices some fatigue but her degree of falling asleep appears disproportionate to the MPN alone which can cause some fatigue.  Is continuing to take her Hydrea as prescribed.  12/04/24: Presents alone, asymptomatic other than significant hip pain limiting her ability to exercise and therefore lose weight in time for her hip replacement surgery.  Notices some fatigue but she suspects this is more related to her pain from her hip issues      Review of Systems -  Oncology  10 point review of systems negative except as stated in HPI    Objective   Past Surgical History:   Procedure Laterality Date    KNEE ARTHROSCOPY W/ DEBRIDEMENT  12/10/2013    Arthroscopy Knee Left    OOPHORECTOMY  08/12/2014    Oophorectomy - Unilateral (Removal Of One Ovary)    OTHER SURGICAL HISTORY  03/08/2022    Tonsillectomy    OTHER SURGICAL HISTORY  01/30/2017    Wrist Surgery     Oncology History    No history exists.       Physical Exam  Vitals reviewed.   Constitutional:       General: She is not in acute distress.     Appearance: She is not toxic-appearing.      Comments: BMI 40   HENT:      Head: Normocephalic and atraumatic.   Eyes:      Comments:      Neck:      Comments:     Cardiovascular:      Rate and Rhythm: Normal rate.   Pulmonary:      Effort: Pulmonary effort is normal.   Neurological:      General: No focal deficit present.      Mental Status: She is oriented to person, place, and time.   Psychiatric:         Mood and Affect: Mood normal.       BSA: There is no height or weight on file to calculate BSA.  /75   Pulse 52   Temp 35.9 °C (96.6 °F) (Core)   Resp 18     Labs:  Lab Results   Component Value Date    WBC 6.5 12/02/2024    NEUTROABS 4.06 12/02/2024    IGABSOL 0.02 12/02/2024    LYMPHSABS 1.83 12/02/2024    MONOSABS 0.43 12/02/2024    EOSABS 0.14 12/02/2024    BASOSABS 0.05 12/02/2024    RBC 4.40 12/02/2024     (H) 12/02/2024    MCHC 33.6 12/02/2024    HGB 15.1 12/02/2024    HCT 45.0 12/02/2024     12/02/2024      Lab Results   Component Value Date    CREATININE 0.81 12/02/2024    BUN 20 12/02/2024    EGFR 76 12/02/2024     12/02/2024    K 4.7 12/02/2024     12/02/2024    CO2 28 12/02/2024      Lab Results   Component Value Date    ALT 26 12/02/2024    AST 26 12/02/2024    ALKPHOS 96 12/02/2024    BILITOT 0.8 12/02/2024       BMBx 5/2020: NORMOCELLULAR BONE MARROW (20-30%) WITH TRILINEAGE HEMATOPOIESIS, MILDLY INCREASED RETICULIN FIBROSIS  AND JAK2 EXON 12 MUTATION, SEE NOTE.  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 Age-adjusted marrow hypercellularity with panmyelosis is one of the three major criteria used to  establish a diagnosis of polycythemia vera. However, WHO criteria allow for the diagnosis in the absence of bone marrow biopsy (thereby omitting the criterion of demonstrating age-adjusted hypercellularity). The patient has a reported history of sustained erythrocytosis and myeloid NGS identified JAK2 exon 12 mutation with VAF of 12%, thereby fulfilling the other two major criteria. In large population studies JAK2 V617F but not the exon 12 mutation have been identified in clonal hematopoiesis of indeterminate potential (CHIP; Yelitza N Engl J Med 2014; 371:2488-249 ). Per EHR, erythropoietin levels are at the very low end of normal, consistent with EPO-independent erythroid proliferation. The overall combination of findings in this patient are most consistent with a diagnosis of polycythemia vera.    - Flow cytometric studies identified a small CD8+ T-cell population (around 3% of events) similar to that identified previously in blood. 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 No significant marrow infiltration was identified by immunostains. The findings are NOT diagnostic for T-LGL.  -Chromosome analysis  -FISH: none  -Molecular: Myeloid NGS panel  The results will be reported separately.    Differential:           (Normal)    %    Promyelocytes         (1-5)       2.5  Myelocytes &   Metamyelocytes    (15-35)     20  Bands & Segs        (15-50)     34.5  Eosinophils               (2-4)      3  Basophils                  (0-1)      0  Lymphocytes           (5-25)     12  Monocytes                (0-2)      0  Plasma Cells             (0-2)     2  Blasts                        (0-1)     0.5  Total Erythroid         (82-19)    25.5  Number of cells counted: 200    Cellularity: Cellular              M:E Ratio: 2.5:1                                                            Electronically Signed Out By RADHA MARIN MD, PhD/CBR  By the signature on this report, the individual or group listed as making the  Final Interpretation/Diagnosis certifies that they have reviewed this case.          Addendum Diagnosis  TEST: Myeloid NGS Panel  DISEASE ASSOCIATED GENOMIC FINDINGS: JAK2 Exon 12 mutation [id number] ([id number]: [id number]) VAF: 12%  Note: The presence of a JAK2 exon 12 mutation fulfills a major WHO diagnostic criterion for establishing diagnosis of polycythemia vera (NCCN 2019).    Microscopic Description:  CBC: WBC 7.4 x 10E9/L, RBC 5.3 x 10E12/L, Hgb 16 g/dl, Hct 48.1%, MCV 91 fL, RDW 14.7%, platelets 435 x 10E9/L.  A 100 cell manual differential count reveals: polys 66%, lymphocytes 26%, monocytes 5%, eosinophils 3%.    PERIPHERAL SMEAR: Submitted             Red cells: Normal.       White cells: Occasional large granular lymphocytes and rare reactive-appearing lymphocytes.       Platelets: Normal, adequate.    ASPIRATE SMEAR: Submitted                         Specimen: Spicular.       Erythropoiesis: Normal.       Granulopoiesis: Normal.       Megakaryocytes: Present. Morphology: A subset of large and/or hyperlobulated megakaryocytes are present.    TOUCH PREP: Submitted       Specimen: Paucicellular.    ASPIRATE CLOT: Submitted                               Specimen: Aspicular.       Comments: There is a small focus degenerating non-hematopoietic cells present (<20 cells), most likely contamination.    CORE BIOPSY: Submitted                           Specimen: Adequate. Aspiration artifact present.       Cellularity: 20-30%.       Estimated M:E ratio: Consistent with aspirate smear.       Bony trabeculae: Normal.       Megakaryocytes: Adequate to slightly increased. Morphology: Occasional larger  forms present. No significant clustering identified.       Granulomas: Absent.       Lymphoid aggregates: Absent.    SPECIAL STAINS:         Iron: Not able to be evaluated on aspicular clot.       Reticulin: Highlights mild increase in reticulin fobrosis (WHO grade MF-1)    IMMUNOHISTOCHEMISTRY: Performed.       CD20: Highlights scattered B-cells and a subset of cells within several  small aggregates       CD3: Highlights moderately numerous T-cells, including a subset of cells within lymphoid aggregates     CD4: Highlights a subset of T-cells; also weaky positive in moderately numerous monocytic cells       CD8: Highlights a subset of T-cells, in slight excess to CD4+ T-cells       CD56: Rare scattered positive cells       CD34: Highlights 1-2% blasts    Assessment/Plan    74 y.o. 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   Previously followed by Alessandra Tripp NP, previously followed with .       # JAK2 exon 12 mutated polycythemia vera: confirmed on BMBx. Has been on hydrea, tolerated well generally, except in the past has had uncontrolled disease with hematocrit consistently greater than 45.  Her dose was recently uptitrated and her disease is now better controlled.  She is generally asymptomatic other than fatigue.  Plan:  -Hydrea 1000 mg daily except on Wednesdays when she takes 1500 mg.  -Folic acid is not on her med list, it is possible she is taking it OTC, but the patient was unsure.  Therefore prescription was sent.  It needs to be taken while the patient is on Hydrea.  - follow up next: 4 months   - labs prior to follow up: CBC/diff, CMP    Jayden Higginbotham MD

## 2024-12-05 DIAGNOSIS — E03.9 HYPOTHYROIDISM, UNSPECIFIED TYPE: ICD-10-CM

## 2024-12-12 ENCOUNTER — OFFICE VISIT (OUTPATIENT)
Dept: ORTHOPEDIC SURGERY | Facility: HOSPITAL | Age: 74
End: 2024-12-12
Payer: MEDICARE

## 2024-12-12 ENCOUNTER — HOSPITAL ENCOUNTER (OUTPATIENT)
Dept: RADIOLOGY | Facility: EXTERNAL LOCATION | Age: 74
Discharge: HOME | End: 2024-12-12

## 2024-12-12 VITALS — WEIGHT: 268 LBS | HEIGHT: 70 IN | BODY MASS INDEX: 38.37 KG/M2

## 2024-12-12 DIAGNOSIS — M16.11 PRIMARY OSTEOARTHRITIS OF RIGHT HIP: ICD-10-CM

## 2024-12-12 DIAGNOSIS — M25.551 RIGHT HIP PAIN: ICD-10-CM

## 2024-12-12 DIAGNOSIS — M16.12 OSTEOARTHRITIS OF LEFT HIP, UNSPECIFIED OSTEOARTHRITIS TYPE: Primary | ICD-10-CM

## 2024-12-12 PROCEDURE — 99214 OFFICE O/P EST MOD 30 MIN: CPT | Performed by: EMERGENCY MEDICINE

## 2024-12-12 PROCEDURE — 1036F TOBACCO NON-USER: CPT | Performed by: EMERGENCY MEDICINE

## 2024-12-12 PROCEDURE — 20611 DRAIN/INJ JOINT/BURSA W/US: CPT | Mod: LT | Performed by: EMERGENCY MEDICINE

## 2024-12-12 PROCEDURE — 2500000004 HC RX 250 GENERAL PHARMACY W/ HCPCS (ALT 636 FOR OP/ED): Performed by: EMERGENCY MEDICINE

## 2024-12-12 PROCEDURE — 1159F MED LIST DOCD IN RCRD: CPT | Performed by: EMERGENCY MEDICINE

## 2024-12-12 PROCEDURE — 99214 OFFICE O/P EST MOD 30 MIN: CPT | Mod: 25 | Performed by: EMERGENCY MEDICINE

## 2024-12-12 RX ORDER — TRIAMCINOLONE ACETONIDE 40 MG/ML
80 INJECTION, SUSPENSION INTRA-ARTICULAR; INTRAMUSCULAR
Status: COMPLETED | OUTPATIENT
Start: 2024-12-12 | End: 2024-12-12

## 2024-12-12 RX ORDER — LIDOCAINE HYDROCHLORIDE 10 MG/ML
4 INJECTION, SOLUTION INFILTRATION; PERINEURAL
Status: COMPLETED | OUTPATIENT
Start: 2024-12-12 | End: 2024-12-12

## 2024-12-12 ASSESSMENT — PAIN - FUNCTIONAL ASSESSMENT: PAIN_FUNCTIONAL_ASSESSMENT: 0-10

## 2024-12-12 ASSESSMENT — PAIN SCALES - GENERAL: PAINLEVEL_OUTOF10: 5 - MODERATE PAIN

## 2024-12-12 ASSESSMENT — PAIN DESCRIPTION - DESCRIPTORS: DESCRIPTORS: ACHING;THROBBING;SHARP

## 2024-12-12 NOTE — PROGRESS NOTES
"Subjective   Ileana Glover is a 74 y.o. female who presents for Pain of the Left Hip    HPI  74-year-old female referred by Dr. Nelson for a left hip intra-articular corticosteroid injection.  Patient has known hip DJD is referred for injection.  She has already had a right hip total replacement and did previously have an injection without adverse effect.    ROS: All pertinent positive symptoms are included in the history of present illness.    All other systems have been reviewed and are negative and noncontributory to this patient's current ailments.    Objective     Vitals:    12/12/24 1545   Weight: 122 kg (268 lb)   Height: 1.765 m (5' 9.5\")       Physical Exam  General/Constitutional: No apparent distress. Well-nourished and well developed.  Head: Normocephalic, Atraumatic.   Eyes: EOMI.  Vascular: No edema, swelling or tenderness, except as noted in detailed exam.  Respiratory: Non-labored breathing.  Integumentary: No impressive skin lesions present, except as noted in detailed exam.  Neurological: Alert and oriented.  Psychological:  Normal mood and affect.  Musculoskeletal: Normal, except as noted in detailed exam.  Left hip: No rash.  No deformity.  No erythema.    Imaging:   I have personally reviewed and agree with Radiologist interpretation of previous imaging studies performed prior to this visit.  XR HIP LEFT WITH PELVIS WHEN PERFORMED 4+ VIEWS    - Impression -  1. Moderate left hip osteoarthrosis.    MACRO:  None.    Signed by: Anabel Bertrand 9/1/2024 10:10 AM  Dictation workstation:   [id number]    Patient ID: Ileana Glover is a 74 y.o. female.    L Inj/Asp: L hip joint on 12/12/2024 4:35 PM  Indications: pain  Details: 20 G needle, ultrasound-guided anterior approach  Medications: 80 mg triamcinolone acetonide 40 mg/mL; 4 mL lidocaine 10 mg/mL (1 %)  Outcome: tolerated well, no immediate complications  Procedure, treatment alternatives, risks and benefits explained, specific risks " discussed. Consent was given by the patient. Immediately prior to procedure a time out was called to verify the correct patient, procedure, equipment, support staff and site/side marked as required. Patient was prepped and draped in the usual sterile fashion.           Assessment/Plan   Problem List Items Addressed This Visit       Osteoarthritis    Relevant Orders    Point of Care Ultrasound (Completed)     Other Visit Diagnoses       Right hip pain    -  Primary          Discussed risks versus benefits of having injection performed. Patient has elected to proceed with procedure. Please refer to procedure note above. Discussed with patient to limit weightbearing activities over the next 24-48 hours, they can then progress to full activities as tolerated. Discussed with patient to avoid water submersion over the next 2 days. Discussed with patient to call me immediately if they develop worsening pain, rash, erythema, or fevers. Patient should follow-up with Dr. Nelson, or return in no sooner than 3 months for repeat injection if she would like.     Taras Oneill, DO  Sports Medicine  [facility], [facility] [place]    ** Please excuse any errors in grammar or translation related to this dictation. Voice recognition software was utilized to prepare this document. **

## 2024-12-13 DIAGNOSIS — I10 PRIMARY HYPERTENSION: ICD-10-CM

## 2024-12-16 ENCOUNTER — APPOINTMENT (OUTPATIENT)
Dept: ORTHOPEDIC SURGERY | Facility: HOSPITAL | Age: 74
End: 2024-12-16
Payer: MEDICARE

## 2024-12-19 ENCOUNTER — APPOINTMENT (OUTPATIENT)
Dept: PRIMARY CARE | Facility: CLINIC | Age: 74
End: 2024-12-19
Payer: MEDICARE

## 2024-12-19 VITALS — DIASTOLIC BLOOD PRESSURE: 76 MMHG | SYSTOLIC BLOOD PRESSURE: 160 MMHG

## 2024-12-19 DIAGNOSIS — E03.9 HYPOTHYROIDISM, UNSPECIFIED TYPE: ICD-10-CM

## 2024-12-19 DIAGNOSIS — I10 PRIMARY HYPERTENSION: ICD-10-CM

## 2024-12-19 DIAGNOSIS — R29.818 SUSPECTED SLEEP APNEA: ICD-10-CM

## 2024-12-19 DIAGNOSIS — M16.9 OSTEOARTHRITIS OF HIP, UNSPECIFIED LATERALITY, UNSPECIFIED OSTEOARTHRITIS TYPE: ICD-10-CM

## 2024-12-19 DIAGNOSIS — R73.09 ABNORMAL GLUCOSE: ICD-10-CM

## 2024-12-19 DIAGNOSIS — E66.01 CLASS 2 SEVERE OBESITY WITH BODY MASS INDEX (BMI) OF 35 TO 39.9 WITH SERIOUS COMORBIDITY: Primary | ICD-10-CM

## 2024-12-19 DIAGNOSIS — E66.812 CLASS 2 SEVERE OBESITY WITH BODY MASS INDEX (BMI) OF 35 TO 39.9 WITH SERIOUS COMORBIDITY: Primary | ICD-10-CM

## 2024-12-19 DIAGNOSIS — D45 POLYCYTHEMIA VERA: ICD-10-CM

## 2024-12-19 DIAGNOSIS — E78.2 MIXED HYPERLIPIDEMIA: ICD-10-CM

## 2024-12-19 PROCEDURE — 1159F MED LIST DOCD IN RCRD: CPT | Performed by: STUDENT IN AN ORGANIZED HEALTH CARE EDUCATION/TRAINING PROGRAM

## 2024-12-19 PROCEDURE — 99215 OFFICE O/P EST HI 40 MIN: CPT | Performed by: STUDENT IN AN ORGANIZED HEALTH CARE EDUCATION/TRAINING PROGRAM

## 2024-12-19 PROCEDURE — 3077F SYST BP >= 140 MM HG: CPT | Performed by: STUDENT IN AN ORGANIZED HEALTH CARE EDUCATION/TRAINING PROGRAM

## 2024-12-19 PROCEDURE — 3078F DIAST BP <80 MM HG: CPT | Performed by: STUDENT IN AN ORGANIZED HEALTH CARE EDUCATION/TRAINING PROGRAM

## 2024-12-19 PROCEDURE — G2211 COMPLEX E/M VISIT ADD ON: HCPCS | Performed by: STUDENT IN AN ORGANIZED HEALTH CARE EDUCATION/TRAINING PROGRAM

## 2024-12-19 RX ORDER — OLMESARTAN MEDOXOMIL 20 MG/1
20 TABLET ORAL DAILY
Qty: 90 TABLET | Refills: 1 | Status: SHIPPED | OUTPATIENT
Start: 2024-12-19 | End: 2025-06-17

## 2024-12-19 RX ORDER — SEMAGLUTIDE 0.25 MG/.5ML
0.25 INJECTION, SOLUTION SUBCUTANEOUS WEEKLY
Qty: 2 ML | Refills: 0 | Status: SHIPPED | OUTPATIENT
Start: 2024-12-19 | End: 2025-01-10

## 2024-12-19 RX ORDER — METOPROLOL SUCCINATE 50 MG/1
TABLET, EXTENDED RELEASE ORAL
Qty: 90 TABLET | Refills: 1 | Status: SHIPPED | OUTPATIENT
Start: 2024-12-19

## 2024-12-19 RX ORDER — LEVOTHYROXINE SODIUM 150 UG/1
150 TABLET ORAL EVERY MORNING
Qty: 90 TABLET | Refills: 1 | Status: SHIPPED | OUTPATIENT
Start: 2024-12-19

## 2024-12-19 ASSESSMENT — PATIENT HEALTH QUESTIONNAIRE - PHQ9
1. LITTLE INTEREST OR PLEASURE IN DOING THINGS: NOT AT ALL
SUM OF ALL RESPONSES TO PHQ9 QUESTIONS 1 AND 2: 0
2. FEELING DOWN, DEPRESSED OR HOPELESS: NOT AT ALL

## 2024-12-19 NOTE — PROGRESS NOTES
Subjective   Patient ID: Ileana Glover is a 74 y.o. female who presents for Follow-up.  HPI  Routine fu    No new complaints. Doing well. Is in the process of losing weight in order to go through with L-hip replacement. Was referred to Endocrine for weight loss. Is doing Weight watchers and water aerobics but isnt losing any weight    Review of Systems  12-point ROS was reviewed and is negative, unless otherwise noted in HPI    Objective   Visit Vitals  /76   OB Status Postmenopausal   Smoking Status Former      Physical Exam  GEN: alert, conversant, NAD  HEENT: PERRL, EOMI, MMM  NECK: supple, no LAD appreciated  CHEST: CTAB  CV: S1, S2, RRR, no murmurs appreciated  ABD: soft, NT, ND  EXT: no significant LE edema  SKIN: warm, dry    Assessment/Plan   #Obesity, severe, class 3: Continue lifestyle modifications, swimming regularly, weight watchers. Start Wegovy, discussed SE profile. Referral to sleep medicine for ARIELLE eval.    #Hypertension: increasing olmesartan to 20mg daily. Monitor metabolic panel in 1wk.    #Tinnitus: right TM clear. Offered referral to ENT on previous visit. Now resolved.     #Hypothyroidism: Maintained on levothyroxine to 150mcg daily     #Polycythemia vera/thrombocytosis: Seeing hematology. Continue hydroxyurea, ASA 81 mg daily.     #Hyperlipidemia: Continue statin.      #Hip/Knee/shoulder OA: following with Juan Nelson and Ashleigh. Had shoulder surgery with Dr. Sotelo in 7/2022. Had right NOEMI 9/2023. Is looking to get L-hip replaced but ortho rec continued weight loss     #Chronic pain: established with pain management - hx of neck injections with significant improvement in symptoms     #Skin lesions: seeing dermatology      #Health maintenance:   Follows with gynecology, who orders mammograms regularly. DEXA performed 2022 Tetanus vaccine 2013.  Pneumovax 2015. Prevnar 20 recommended. Colonoscopy 5/2023, 3 year fu advised. Received Shingrix and COVID-19 vaccine. Advised yearly flu  shot. Longitudinal care.    RTC in o    Irwin Detawanda,   PGY-2, Internal Medicine    Trainee role: Resident    I saw and evaluated the patient. I personally obtained the key and critical portions of the history and physical exam or was physically present for key and critical portions performed by the trainee. I reviewed the trainee's documentation and discussed the patient with the trainee. I agree with the trainee's medical decision making as documented on the trainee's notes.    Ghassan Bush M.D.

## 2025-01-30 ENCOUNTER — OFFICE VISIT (OUTPATIENT)
Dept: PRIMARY CARE | Facility: CLINIC | Age: 75
End: 2025-01-30
Payer: MEDICARE

## 2025-01-30 VITALS — DIASTOLIC BLOOD PRESSURE: 72 MMHG | SYSTOLIC BLOOD PRESSURE: 130 MMHG

## 2025-01-30 DIAGNOSIS — R51.9 NEW ONSET OF HEADACHES AFTER AGE 50: ICD-10-CM

## 2025-01-30 DIAGNOSIS — R29.898 BILATERAL LEG WEAKNESS: Primary | ICD-10-CM

## 2025-01-30 PROCEDURE — 99214 OFFICE O/P EST MOD 30 MIN: CPT | Performed by: STUDENT IN AN ORGANIZED HEALTH CARE EDUCATION/TRAINING PROGRAM

## 2025-01-30 PROCEDURE — 3078F DIAST BP <80 MM HG: CPT | Performed by: STUDENT IN AN ORGANIZED HEALTH CARE EDUCATION/TRAINING PROGRAM

## 2025-01-30 PROCEDURE — 3075F SYST BP GE 130 - 139MM HG: CPT | Performed by: STUDENT IN AN ORGANIZED HEALTH CARE EDUCATION/TRAINING PROGRAM

## 2025-01-30 PROCEDURE — G2211 COMPLEX E/M VISIT ADD ON: HCPCS | Performed by: STUDENT IN AN ORGANIZED HEALTH CARE EDUCATION/TRAINING PROGRAM

## 2025-01-30 ASSESSMENT — ENCOUNTER SYMPTOMS: HEADACHES: 1

## 2025-01-30 NOTE — PROGRESS NOTES
Subjective   Patient ID: Ileana Glover is a 75 y.o. female who presents for Headache (weakness).    HPI     Review of Systems    Objective   /72     Physical Exam    Assessment/Plan

## 2025-01-30 NOTE — PROGRESS NOTES
Subjective   Patient ID: Ileana Glover is a 75 y.o. female who presents for Headache (weakness).    Reports that 8 months ago she went to use the bathroom in the middle of the night and lost strength in her left leg and has happened 3x since then. States that she went bowling a few weeks ago and maybe over-exerted herself but then she went to stand and neither leg could hold her up. Did not fall. Reports that this has only happened where she is sitting/laying and goes to stand up. Feels like she has no power. Reports tiredness in her bilateral thighs and her left leg. Has not gone back to swimming because of using the ladder.    Has additionally reported recent pain at 2am where she woke up with excruciating pain. Reports that it was the whole head and down both sides especially the right side, felt like a steel bar going into her head. Has never had this pain before. Denies nausea/vomiting. Worse headache of her life. Felt like she got hit in the head with a hammer. Laid in bed for 45 minutes and then went back to sleep. Pain radiated down to her legs, down her spine and could not use her legs.      Headache         Review of Systems   Neurological:  Positive for headaches.      12 point ros negative unless stated above    Objective   Visit Vitals  /72   OB Status Postmenopausal   Smoking Status Former     Physical Exam  Constitutional:       General: She is not in acute distress.     Appearance: Normal appearance.   HENT:      Head: Normocephalic and atraumatic.   Eyes:      General: No scleral icterus.  Cardiovascular:      Rate and Rhythm: Normal rate and regular rhythm.   Pulmonary:      Effort: Pulmonary effort is normal.      Breath sounds: Normal breath sounds.   Abdominal:      General: Abdomen is flat. Bowel sounds are normal. There is no distension.      Palpations: Abdomen is soft.   Musculoskeletal:      Cervical back: Rigidity present.      Right lower leg: No edema.      Left lower leg: No  "edema.   Skin:     General: Skin is warm and dry.   Neurological:      Mental Status: She is alert.      Comments: Cranial nerves grossly intact, bilateral strength and sensation intact in the upper extremities, bilateral sensation intact in the lower extremities -> left thigh 3/5, right thigh 5/5   Psychiatric:         Mood and Affect: Mood normal.         Labs:  Most recent labs:             No lab exists for component: \"ELIZABETH\", \"POTASS\", \"LABGLOM\"         No lab exists for component: \"BILIT\"         No lab exists for component: \"PT\"                No lab exists for component: \"BNPRESU\", \"CPKT\"        Imaging:  All images:   No results found.     Assessment/Plan   Problem List Items Addressed This Visit    None  Visit Diagnoses       Bilateral leg weakness    -  Primary    Relevant Orders    Referral to Physical Therapy    New onset of headaches after age 49        Relevant Orders    CT head wo IV contrast          #Headache  #Worse Headache of Her Life  -CT Head to r/o SAH -> neuro exam benign but due to alarm symptoms will get CT (not on blood thinners)    #Left Leg Pain  #Left Hip Pain  #Bilateral Hip Pain  -has appointment w/ ortho Dr. Nelson tomorrow to discuss this, was previously recommended hip replacement in the future after optimization  -X-Ray w/ Dr. Nelson tomorrow -> will defer spinal MRI but can consider due to progressive weakness in bilateral thighs  -continue to follow with pain management and referral to physical therapy w/ aquatherapy    Courtney Brady, PGY-2  [facility]/ Internal Medicine  Staffed with the attending physician Dr. Bush    Trainee role: Resident    I saw and evaluated the patient. I personally obtained the key and critical portions of the history and physical exam or was physically present for key and critical portions performed by the trainee. I reviewed the trainee's documentation and discussed the patient with the trainee. I agree with the trainee's medical decision making as " documented on the trainee's notes.    Ghassan Bush M.D.

## 2025-01-31 ENCOUNTER — HOSPITAL ENCOUNTER (OUTPATIENT)
Dept: RADIOLOGY | Facility: CLINIC | Age: 75
Discharge: HOME | End: 2025-01-31
Payer: MEDICARE

## 2025-01-31 ENCOUNTER — APPOINTMENT (OUTPATIENT)
Dept: ORTHOPEDIC SURGERY | Facility: CLINIC | Age: 75
End: 2025-01-31
Payer: MEDICARE

## 2025-01-31 VITALS — HEIGHT: 70 IN | BODY MASS INDEX: 38.15 KG/M2 | WEIGHT: 266.5 LBS

## 2025-01-31 DIAGNOSIS — M48.061 SPINAL STENOSIS OF LUMBAR REGION, UNSPECIFIED WHETHER NEUROGENIC CLAUDICATION PRESENT: Primary | ICD-10-CM

## 2025-01-31 DIAGNOSIS — M25.562 LEFT KNEE PAIN, UNSPECIFIED CHRONICITY: ICD-10-CM

## 2025-01-31 PROCEDURE — 99214 OFFICE O/P EST MOD 30 MIN: CPT | Performed by: ORTHOPAEDIC SURGERY

## 2025-01-31 PROCEDURE — 1036F TOBACCO NON-USER: CPT | Performed by: ORTHOPAEDIC SURGERY

## 2025-01-31 PROCEDURE — 73562 X-RAY EXAM OF KNEE 3: CPT | Mod: LT

## 2025-01-31 PROCEDURE — 1159F MED LIST DOCD IN RCRD: CPT | Performed by: ORTHOPAEDIC SURGERY

## 2025-01-31 NOTE — PROGRESS NOTES
Chief Complaint   Chief Complaint   Patient presents with    Left Hip - Pain    Left Knee - Pain         HPI:      Ileana Glover is a pleasant 75 y.o. year-old female who is seen today for left leg weakness she has noticed her quad is giving out on the left side and sometimes on her right side she has had severe pain in her back she does feel like her legs give out on her if she walks for period time she feels better when she sits down and flexes her spine she has no numbness or tingling no bowel or bladder dysfunction    Review of Systems all other body systems have been reviewed and are negative for complaint.    There were no vitals filed for this visit.    Past Medical History:   Diagnosis Date    Essential (primary) hypertension 10/26/2022    Hypertension    Hypothyroidism, unspecified 10/26/2022    Hypothyroidism    Personal history of other diseases of the respiratory system 10/21/2022    History of acute pharyngitis    Pure hypercholesterolemia, unspecified 08/12/2014    High cholesterol     Patient Active Problem List   Diagnosis    Obesity    JAK2 positive polycythemia vera (Multi)    Hyperlipidemia    Allergic contact dermatitis due to plants, except food    Arthritis, midfoot    Internal hemorrhoids    BRBPR (bright red blood per rectum)    Carpal tunnel syndrome    Cervical radiculopathy at C6    Contracture of Achilles tendon    Distal radius fracture, left    Hemangioma of skin and subcutaneous tissue    Hypercalcemia    Hyperkalemia    Hypertension    Hypothyroidism    Inconclusive mammogram    Melanocytic nevi of scalp and neck    Osteoarthritis    Pelvic floor weakness    Peroneal tendon tear    Rash and nonspecific skin eruption    Skin changes due to chronic exposure to nonionizing radiation, unspecified    Thrombocytosis    GERD (gastroesophageal reflux disease)    Age-related osteoporosis without current pathological fracture    Unspecified rotator cuff tear or rupture of left shoulder, not  specified as traumatic    Rectal hemorrhage    Swelling of lower extremity    Thumb pain    Productive cough    Polyp of colon    History of colonic polyps    History of repair of hip joint    Low back pain with sciatica    Osteopenia    Overactive bladder    Erythrocytosis    Diverticulosis of large intestine    Disorder of ankle    Constipation    Cellulitis    Bilateral primary osteoarthritis of knee    Benign neoplasm of rectum    Benign neoplasm of transverse colon    Benign neoplasm of cecum    Benign neoplasm of ascending colon    Atrophic vaginitis    Arthralgia of shoulder    Arthralgia of knee    Pain of right lower extremity    Arthralgia of hip    Pain of foot    Acute pharyngitis    Acute bacterial bronchitis    Vertigo    Urinary incontinence    Urinary urgency    Paresis of single lower extremity (Multi)    Fatigue    Headache    Xerosis cutis    Bleeding hemorrhoids    Rash    Skin lesion    Gastroesophageal reflux disease    Fracture of distal end of radius    Arthritis of foot    Derangement of knee    Arthritis of knee    Allergic contact dermatitis due to plant    Presence of left artificial shoulder joint    Melanocytic nevi of left upper limb, including shoulder    Other specified disorders of bone density and structure, unspecified site    Pain in left hip    Personal history of nicotine dependence    Other seborrheic keratosis    Pain in right shoulder    Other long term (current) drug therapy    Pain in left shoulder    Melanocytic nevi of other parts of face    Obesity, unspecified    Low back pain, unspecified    Contact with and (suspected) exposure to covid-19    Genetic susceptibility to other disease    Long term (current) use of aspirin    Bradycardia, unspecified    Bloody stool    Knee pain    Pain in right hip    Melanocytic nevi of trunk    Melanocytic nevi of unspecified upper limb, including shoulder    Pain in right lower leg    Other spondylosis with radiculopathy, cervical  region    Obesity with body mass index 30 or greater    Severe obesity (Multi)    Pelvic floor dysfunction    Plantar fasciitis    Injury of tendon of lower extremity    Thrombocythemia    Melanocytic nevus of skin       Medication Documentation Review Audit       Reviewed by Lewis Xavier MA (Medical Assistant) on 01/31/25 at 0908      Medication Order Taking? Sig Documenting Provider Last Dose Status   acetaminophen (Tylenol 8 HOUR) 650 mg ER tablet [id number] No Take 2 tablets (1,300 mg) by mouth every 8 hours if needed. Historical Provider, MD Taking Active   amoxicillin (Amoxil) 500 mg capsule [id number]  Take 1 capsule (500 mg) by mouth every 12 hours. Historical Provider, MD  Active   aspirin 81 mg EC tablet [id number]  Take 1 tablet (81 mg) by mouth once daily. Historical Provider, MD  Active   azithromycin (Zithromax) 250 mg tablet [id number]  Take 1 tablet (250 mg) by mouth once daily. Historical Provider, MD  Active   calcium carbonate-vitamin D3 500 mg-3.125 mcg (125 unit) tablet tablet [id number] No Take by mouth. Historical Provider, MD Taking Active   diclofenac sodium (Voltaren Arthritis Pain) 1 % gel [id number] No Apply 4.5 inches (4 g) topically 4 times a day as needed (midfoot pain). Anup Barrientos MD Taking Active   estradiol (Estrace) 0.01 % (0.1 mg/gram) vaginal cream [id number] No Please use a dime sized or pea-sized amount vaginally 3 times a week. Shawn Salazar MD Taking Active   fluticasone (Flonase) 50 mcg/actuation nasal spray [id number] No Administer 1 spray into each nostril once daily. Shake gently. Before first use, prime pump. After use, clean tip and replace cap. Wyatt Genao,  Taking Active   folic acid (Folvite) 1 mg tablet [id number]  Take 1 tablet (1 mg) by mouth once daily. Jayden Good MD  Active   hydrocortisone 2.5 % cream [id number] No once daily. Historical Provider, MD Not Taking Active   hydroxyurea (Hydrea) 500 mg capsule [id number]  2 capsules PO every day with  food - Except on Wednesdays take 3 caps PO on Wednesdays Gregg Robles PA-C  Active    mg tablet [id number] No TAKE ONE TABLET BY MOUTH EVERY 8 HOURS with food or milk AS NEEDED Historical Provider, MD Taking Active   meloxicam (Mobic) 15 mg tablet [id number] No TAKE ONE TABLET BY MOUTH EVERY DAY Maurizio Nelson MD Not Taking Active   meloxicam (Mobic) 15 mg tablet [id number] No Take 1 tablet (15 mg) by mouth once daily. Maurizio Nelson MD Taking Active   metoprolol succinate XL (Toprol-XL) 50 mg 24 hr tablet [id number]  TAKE 1 TABLET ONCE DAILY, DO NOT CRUSH OR CHEW (DISCARD PRESCRIPTION FOR METOPROLOL TARTRATE 25MG) Ghassan Bush MD  Active   olmesartan (Benicar) 20 mg tablet [id number]  Take 1 tablet (20 mg) by mouth once daily. Ghassan Bush MD  Active   ORTHOVISC 30 mg/2 mL injection [id number] No Inject into the joint. Historical Provider, MD Taking Active   oxybutynin XL (Ditropan-XL) 10 mg 24 hr tablet [id number]  Take 1 tablet (10 mg) by mouth 2 times a day. Shawn Salazar MD  Active   polyethylene glycol-electrolytes 420 gram solution [id number] No Take by mouth. Historical Provider, MD Taking Active     Discontinued 01/30/25 1229   simvastatin (Zocor) 20 mg tablet [id number] No Take 1 tablet (20 mg) by mouth once daily. Ghassan Bush MD Taking Active   Synthroid 150 mcg tablet [id number]  TAKE 1 TABLET ONCE DAILY INTHE MORNING BEFORE A MEAL Ghassan Bush MD  Active   triamcinolone (Kenalog) 0.1 % cream [id number] No 0 Historical Provider, MD Not Taking Active   vibegron 75 mg tablet [id number] No Take 1 tablet (75 mg) by mouth once daily. Shawn Salazar MD Not Taking Active                    Allergies   Allergen Reactions    Sulfa (Sulfonamide Antibiotics) Rash       Social History     Socioeconomic History    Marital status:      Spouse name: Not on file    Number of children: Not on file    Years of education: Not on file    Highest education level: Not on file    Occupational History    Not on file   Tobacco Use    Smoking status: Former     Types: Cigarettes    Smokeless tobacco: Never   Vaping Use    Vaping status: Never Used   Substance and Sexual Activity    Alcohol use: Yes     Comment: social    Drug use: Not Currently    Sexual activity: Never     Birth control/protection: None   Other Topics Concern    Not on file   Social History Narrative    Not on file     Social Drivers of Health     Financial Resource Strain: Not on file   Food Insecurity: Not on file   Transportation Needs: Not on file   Physical Activity: Not on file   Stress: Not on file   Social Connections: Not on file   Intimate Partner Violence: Not on file   Housing Stability: Not on file       Past Surgical History:   Procedure Laterality Date    KNEE ARTHROSCOPY W/ DEBRIDEMENT  12/10/2013    Arthroscopy Knee Left    OOPHORECTOMY  08/12/2014    Oophorectomy - Unilateral (Removal Of One Ovary)    OTHER SURGICAL HISTORY  03/08/2022    Tonsillectomy    OTHER SURGICAL HISTORY  01/30/2017    Wrist Surgery       Body mass index is 38.79 kg/m².    HgA1c:   Lab Results   Component Value Date    HGBA1C 5.2 09/24/2021    [id number] 103 09/24/2021       Physical Exam:  Constitutional: Well-developed well-nourished   Eyes: Sclerae anicteric, pupils equal and round  HENT: Normocephalic atraumatic  Cardiovascular: Pulses full, regular rate and rhythm  Respiratory: Breathing not labored, no wheezing  Integumentary: Skin intact, no lesions or rashes  Neurological: Sensation intact, no gross strength deficits, reflexes equal  Psychiatric: Alert oriented and appropriate  Hematologic/lymphatic: No lymphadenopathy  Mild restriction mobility left hip with some reproduction of some groin pain she has satisfactory distal strength she is tender lumbosacral junction  Examination knee reveals some mild tenderness laterally no major deformities or effusion          Imaging:  X-rays of the lumbar spine show anterior listhesis at  L3-L4  X-rays of the knee show some mild tricompartment arthritis and she has known arthritis of the left hip        Impression/Plan:  Pain is multifactorial but it seems like her primary complaint is spinal stenosis she has had x-rays of her lumbar spine which shows anterolisthesis recommend MRI may need referral to pain management for injections

## 2025-02-06 DIAGNOSIS — E78.2 MIXED HYPERLIPIDEMIA: ICD-10-CM

## 2025-02-06 RX ORDER — SIMVASTATIN 20 MG/1
20 TABLET, FILM COATED ORAL DAILY
Qty: 90 TABLET | Refills: 1 | Status: SHIPPED | OUTPATIENT
Start: 2025-02-06

## 2025-02-11 ENCOUNTER — APPOINTMENT (OUTPATIENT)
Dept: ORTHOPEDIC SURGERY | Facility: CLINIC | Age: 75
End: 2025-02-11
Payer: MEDICARE

## 2025-02-13 ENCOUNTER — APPOINTMENT (OUTPATIENT)
Dept: ORTHOPEDIC SURGERY | Facility: CLINIC | Age: 75
End: 2025-02-13
Payer: MEDICARE

## 2025-02-13 DIAGNOSIS — M17.12 ARTHRITIS OF LEFT KNEE: ICD-10-CM

## 2025-02-13 PROCEDURE — 1159F MED LIST DOCD IN RCRD: CPT | Performed by: ORTHOPAEDIC SURGERY

## 2025-02-13 PROCEDURE — 99213 OFFICE O/P EST LOW 20 MIN: CPT | Performed by: ORTHOPAEDIC SURGERY

## 2025-02-13 PROCEDURE — 1036F TOBACCO NON-USER: CPT | Performed by: ORTHOPAEDIC SURGERY

## 2025-02-13 RX ORDER — HYALURONATE SODIUM 30 MG/2 ML
30 SYRINGE (ML) INTRAARTICULAR WEEKLY
Qty: 6 ML | Refills: 0 | Status: SHIPPED | OUTPATIENT
Start: 2025-02-13 | End: 2025-03-13

## 2025-02-13 NOTE — PROGRESS NOTES
Chief Complaint   Chief Complaint   Patient presents with    Left Hip - Pain    Left Knee - Pain         HPI:      Ileana Glover is a pleasant 75 y.o. year-old female who is seen today for left knee pain she has had viscosupplementation in the past with good result she has pain at rest pain with activity she needs to walk with a cane she also has advanced left hip arthritis for which she is getting physical therapy she is considering hip replacement surgery in the springtime advanced knee arthritis apply for viscosupplementation approval    Review of Systems all other body systems have been reviewed and are negative for complaint.    There were no vitals filed for this visit.    Past Medical History:   Diagnosis Date    Essential (primary) hypertension 10/26/2022    Hypertension    Hypothyroidism, unspecified 10/26/2022    Hypothyroidism    Personal history of other diseases of the respiratory system 10/21/2022    History of acute pharyngitis    Pure hypercholesterolemia, unspecified 08/12/2014    High cholesterol     Patient Active Problem List   Diagnosis    Obesity    JAK2 positive polycythemia vera (Multi)    Hyperlipidemia    Allergic contact dermatitis due to plants, except food    Arthritis, midfoot    Internal hemorrhoids    BRBPR (bright red blood per rectum)    Carpal tunnel syndrome    Cervical radiculopathy at C6    Contracture of Achilles tendon    Distal radius fracture, left    Hemangioma of skin and subcutaneous tissue    Hypercalcemia    Hyperkalemia    Hypertension    Hypothyroidism    Inconclusive mammogram    Melanocytic nevi of scalp and neck    Osteoarthritis    Pelvic floor weakness    Peroneal tendon tear    Rash and nonspecific skin eruption    Skin changes due to chronic exposure to nonionizing radiation, unspecified    Thrombocytosis    GERD (gastroesophageal reflux disease)    Age-related osteoporosis without current pathological fracture    Unspecified rotator cuff tear or rupture of  left shoulder, not specified as traumatic    Rectal hemorrhage    Swelling of lower extremity    Thumb pain    Productive cough    Polyp of colon    History of colonic polyps    History of repair of hip joint    Low back pain with sciatica    Osteopenia    Overactive bladder    Erythrocytosis    Diverticulosis of large intestine    Disorder of ankle    Constipation    Cellulitis    Bilateral primary osteoarthritis of knee    Benign neoplasm of rectum    Benign neoplasm of transverse colon    Benign neoplasm of cecum    Benign neoplasm of ascending colon    Atrophic vaginitis    Arthralgia of shoulder    Arthralgia of knee    Pain of right lower extremity    Arthralgia of hip    Pain of foot    Acute pharyngitis    Acute bacterial bronchitis    Vertigo    Urinary incontinence    Urinary urgency    Paresis of single lower extremity (Multi)    Fatigue    Headache    Xerosis cutis    Bleeding hemorrhoids    Rash    Skin lesion    Gastroesophageal reflux disease    Fracture of distal end of radius    Arthritis of foot    Derangement of knee    Arthritis of knee    Allergic contact dermatitis due to plant    Presence of left artificial shoulder joint    Melanocytic nevi of left upper limb, including shoulder    Other specified disorders of bone density and structure, unspecified site    Pain in left hip    Personal history of nicotine dependence    Other seborrheic keratosis    Pain in right shoulder    Other long term (current) drug therapy    Pain in left shoulder    Melanocytic nevi of other parts of face    Obesity, unspecified    Low back pain, unspecified    Contact with and (suspected) exposure to covid-19    Genetic susceptibility to other disease    Long term (current) use of aspirin    Bradycardia, unspecified    Bloody stool    Knee pain    Pain in right hip    Melanocytic nevi of trunk    Melanocytic nevi of unspecified upper limb, including shoulder    Pain in right lower leg    Other spondylosis with  radiculopathy, cervical region    Obesity with body mass index 30 or greater    Severe obesity (Multi)    Pelvic floor dysfunction    Plantar fasciitis    Injury of tendon of lower extremity    Thrombocythemia    Melanocytic nevus of skin       Medication Documentation Review Audit       Reviewed by Lewis Xavier MA (Medical Assistant) on 25 at 0854      Medication Order Taking? Sig Documenting Provider Last Dose Status   acetaminophen (Tylenol 8 HOUR) 650 mg ER tablet [id number] No Take 2 tablets (1,300 mg) by mouth every 8 hours if needed. Historical Provider, MD Taking Active   amoxicillin (Amoxil) 500 mg capsule [id number]  Take 1 capsule (500 mg) by mouth every 12 hours. Historical Provider, MD  Active   aspirin 81 mg EC tablet [id number]  Take 1 tablet (81 mg) by mouth once daily. Historical Provider, MD  Active   azithromycin (Zithromax) 250 mg tablet [id number]  Take 1 tablet (250 mg) by mouth once daily. Historical Provider, MD  Active   calcium carbonate-vitamin D3 500 mg-3.125 mcg (125 unit) tablet tablet [id number] No Take by mouth. Historical Provider, MD Taking Active   diclofenac sodium (Voltaren Arthritis Pain) 1 % gel [id number] No Apply 4.5 inches (4 g) topically 4 times a day as needed (midfoot pain). Anup Barrientos MD Taking Active   estradiol (Estrace) 0.01 % (0.1 mg/gram) vaginal cream [id number] No Please use a dime sized or pea-sized amount vaginally 3 times a week. Shawn Salazar MD Taking Active   fluticasone (Flonase) 50 mcg/actuation nasal spray [id number] No Administer 1 spray into each nostril once daily. Shake gently. Before first use, prime pump. After use, clean tip and replace cap. Wyatt Genao,  Taking  25 0609   folic acid (Folvite) 1 mg tablet [id number]  Take 1 tablet (1 mg) by mouth once daily. Jayden Good MD  Active   hydrocortisone 2.5 % cream [id number] No once daily. Historical Provider, MD Not Taking Active   hydroxyurea (Hydrea) 500 mg capsule  [id number]  2 capsules PO every day with food - Except on Wednesdays take 3 caps PO on Wednesdays Gregg Robles PA-C  Active    mg tablet [id number] No TAKE ONE TABLET BY MOUTH EVERY 8 HOURS with food or milk AS NEEDED Historical Provider, MD Taking Active   meloxicam (Mobic) 15 mg tablet [id number] No TAKE ONE TABLET BY MOUTH EVERY DAY Maurizio Nelson MD Not Taking Active   meloxicam (Mobic) 15 mg tablet [id number] No Take 1 tablet (15 mg) by mouth once daily. Maurizio Nelson MD Taking Active   metoprolol succinate XL (Toprol-XL) 50 mg 24 hr tablet [id number]  TAKE 1 TABLET ONCE DAILY, DO NOT CRUSH OR CHEW (DISCARD PRESCRIPTION FOR METOPROLOL TARTRATE 25MG) Ghassan Bush MD  Active   olmesartan (Benicar) 20 mg tablet [id number]  Take 1 tablet (20 mg) by mouth once daily. Ghassan Bush MD  Active   ORTHOVISC 30 mg/2 mL injection [id number] No Inject into the joint. Historical Provider, MD Taking Active   oxybutynin XL (Ditropan-XL) 10 mg 24 hr tablet [id number]  Take 1 tablet (10 mg) by mouth 2 times a day. Shawn Salazar MD  Active   polyethylene glycol-electrolytes 420 gram solution [id number] No Take by mouth. Michaela Provider, MD Taking Active   simvastatin (Zocor) 20 mg tablet [id number]  TAKE 1 TABLET ONCE DAILY Ghassan Bush MD  Active   Synthroid 150 mcg tablet [id number]  TAKE 1 TABLET ONCE DAILY INTHE MORNING BEFORE A MEAL Ghassan Bush MD  Active   triamcinolone (Kenalog) 0.1 % cream [id number] No 0 Historical Provider, MD Not Taking Active   vibegron 75 mg tablet [id number] No Take 1 tablet (75 mg) by mouth once daily. Shawn Salazar MD Not Taking Active                    Allergies   Allergen Reactions    Sulfa (Sulfonamide Antibiotics) Rash       Social History     Socioeconomic History    Marital status:      Spouse name: Not on file    Number of children: Not on file    Years of education: Not on file    Highest education level: Not on file   Occupational History    Not  on file   Tobacco Use    Smoking status: Former     Types: Cigarettes    Smokeless tobacco: Never   Vaping Use    Vaping status: Never Used   Substance and Sexual Activity    Alcohol use: Yes     Comment: social    Drug use: Not Currently    Sexual activity: Never     Birth control/protection: None   Other Topics Concern    Not on file   Social History Narrative    Not on file     Social Drivers of Health     Financial Resource Strain: Not on file   Food Insecurity: Not on file   Transportation Needs: Not on file   Physical Activity: Not on file   Stress: Not on file   Social Connections: Not on file   Intimate Partner Violence: Not on file   Housing Stability: Not on file       Past Surgical History:   Procedure Laterality Date    KNEE ARTHROSCOPY W/ DEBRIDEMENT  12/10/2013    Arthroscopy Knee Left    OOPHORECTOMY  08/12/2014    Oophorectomy - Unilateral (Removal Of One Ovary)    OTHER SURGICAL HISTORY  03/08/2022    Tonsillectomy    OTHER SURGICAL HISTORY  01/30/2017    Wrist Surgery       There is no height or weight on file to calculate BMI.    HgA1c:   Lab Results   Component Value Date    HGBA1C 5.2 09/24/2021    [id number] 103 09/24/2021       Physical Exam:  Morbidly obese  Left knee crepitant small effusion maximally tender laterally no gross instability          Imaging:  X-rays are reviewed show advanced arthritis chiefly affecting lateral compartment        Impression/Plan:  Knee arthritis steroid injections have not been helpful in the past viscosupplementation has we will apply for approval

## 2025-02-14 ENCOUNTER — SPECIALTY PHARMACY (OUTPATIENT)
Dept: PHARMACY | Facility: CLINIC | Age: 75
End: 2025-02-14

## 2025-02-18 ENCOUNTER — HOSPITAL ENCOUNTER (OUTPATIENT)
Dept: RADIOLOGY | Facility: HOSPITAL | Age: 75
Discharge: HOME | End: 2025-02-18
Payer: MEDICARE

## 2025-02-18 DIAGNOSIS — R51.9 NEW ONSET OF HEADACHES AFTER AGE 50: ICD-10-CM

## 2025-02-18 DIAGNOSIS — M48.061 SPINAL STENOSIS OF LUMBAR REGION, UNSPECIFIED WHETHER NEUROGENIC CLAUDICATION PRESENT: ICD-10-CM

## 2025-02-18 PROCEDURE — 70450 CT HEAD/BRAIN W/O DYE: CPT | Performed by: RADIOLOGY

## 2025-02-18 PROCEDURE — 72148 MRI LUMBAR SPINE W/O DYE: CPT | Performed by: RADIOLOGY

## 2025-02-18 PROCEDURE — 72148 MRI LUMBAR SPINE W/O DYE: CPT

## 2025-02-18 PROCEDURE — 70450 CT HEAD/BRAIN W/O DYE: CPT

## 2025-03-10 ENCOUNTER — LAB (OUTPATIENT)
Dept: LAB | Facility: HOSPITAL | Age: 75
End: 2025-03-10
Payer: MEDICARE

## 2025-03-10 DIAGNOSIS — D45 POLYCYTHEMIA VERA: Primary | ICD-10-CM

## 2025-03-10 LAB
ALBUMIN SERPL BCP-MCNC: 4 G/DL (ref 3.4–5)
ALP SERPL-CCNC: 72 U/L (ref 33–136)
ALT SERPL W P-5'-P-CCNC: 18 U/L (ref 7–45)
ANION GAP SERPL CALC-SCNC: 12 MMOL/L (ref 10–20)
AST SERPL W P-5'-P-CCNC: 15 U/L (ref 9–39)
BASOPHILS # BLD AUTO: 0.03 X10*3/UL (ref 0–0.1)
BASOPHILS NFR BLD AUTO: 0.6 %
BILIRUB SERPL-MCNC: 0.5 MG/DL (ref 0–1.2)
BUN SERPL-MCNC: 19 MG/DL (ref 6–23)
CALCIUM SERPL-MCNC: 9.4 MG/DL (ref 8.6–10.6)
CHLORIDE SERPL-SCNC: 103 MMOL/L (ref 98–107)
CO2 SERPL-SCNC: 29 MMOL/L (ref 21–32)
CREAT SERPL-MCNC: 0.93 MG/DL (ref 0.5–1.05)
EGFRCR SERPLBLD CKD-EPI 2021: 64 ML/MIN/1.73M*2
EOSINOPHIL # BLD AUTO: 0.11 X10*3/UL (ref 0–0.4)
EOSINOPHIL NFR BLD AUTO: 2.1 %
ERYTHROCYTE [DISTWIDTH] IN BLOOD BY AUTOMATED COUNT: 14.6 % (ref 11.5–14.5)
GLUCOSE SERPL-MCNC: 89 MG/DL (ref 74–99)
HCT VFR BLD AUTO: 43.3 % (ref 36–46)
HGB BLD-MCNC: 14.5 G/DL (ref 12–16)
IMM GRANULOCYTES # BLD AUTO: 0.02 X10*3/UL (ref 0–0.5)
IMM GRANULOCYTES NFR BLD AUTO: 0.4 % (ref 0–0.9)
LYMPHOCYTES # BLD AUTO: 1.92 X10*3/UL (ref 0.8–3)
LYMPHOCYTES NFR BLD AUTO: 36.3 %
MCH RBC QN AUTO: 35.4 PG (ref 26–34)
MCHC RBC AUTO-ENTMCNC: 33.5 G/DL (ref 32–36)
MCV RBC AUTO: 106 FL (ref 80–100)
MONOCYTES # BLD AUTO: 0.42 X10*3/UL (ref 0.05–0.8)
MONOCYTES NFR BLD AUTO: 7.9 %
NEUTROPHILS # BLD AUTO: 2.79 X10*3/UL (ref 1.6–5.5)
NEUTROPHILS NFR BLD AUTO: 52.7 %
NRBC BLD-RTO: 0 /100 WBCS (ref 0–0)
PLATELET # BLD AUTO: 201 X10*3/UL (ref 150–450)
POTASSIUM SERPL-SCNC: 4.9 MMOL/L (ref 3.5–5.3)
PROT SERPL-MCNC: 6.2 G/DL (ref 6.4–8.2)
RBC # BLD AUTO: 4.1 X10*6/UL (ref 4–5.2)
SODIUM SERPL-SCNC: 139 MMOL/L (ref 136–145)
WBC # BLD AUTO: 5.3 X10*3/UL (ref 4.4–11.3)

## 2025-03-10 PROCEDURE — 85025 COMPLETE CBC W/AUTO DIFF WBC: CPT

## 2025-03-10 PROCEDURE — 36415 COLL VENOUS BLD VENIPUNCTURE: CPT

## 2025-03-10 PROCEDURE — 80053 COMPREHEN METABOLIC PANEL: CPT

## 2025-03-12 ENCOUNTER — SPECIALTY PHARMACY (OUTPATIENT)
Dept: PHARMACY | Facility: CLINIC | Age: 75
End: 2025-03-12

## 2025-03-12 ENCOUNTER — OFFICE VISIT (OUTPATIENT)
Dept: HEMATOLOGY/ONCOLOGY | Facility: CLINIC | Age: 75
End: 2025-03-12
Payer: MEDICARE

## 2025-03-12 VITALS
TEMPERATURE: 97.5 F | OXYGEN SATURATION: 93 % | BODY MASS INDEX: 38.57 KG/M2 | RESPIRATION RATE: 18 BRPM | SYSTOLIC BLOOD PRESSURE: 145 MMHG | DIASTOLIC BLOOD PRESSURE: 80 MMHG | HEART RATE: 56 BPM | WEIGHT: 265 LBS

## 2025-03-12 DIAGNOSIS — D45 POLYCYTHEMIA VERA: ICD-10-CM

## 2025-03-12 DIAGNOSIS — D45 JAK2 POSITIVE POLYCYTHEMIA VERA (MULTI): ICD-10-CM

## 2025-03-12 PROCEDURE — G2211 COMPLEX E/M VISIT ADD ON: HCPCS | Performed by: STUDENT IN AN ORGANIZED HEALTH CARE EDUCATION/TRAINING PROGRAM

## 2025-03-12 PROCEDURE — 3079F DIAST BP 80-89 MM HG: CPT | Performed by: STUDENT IN AN ORGANIZED HEALTH CARE EDUCATION/TRAINING PROGRAM

## 2025-03-12 PROCEDURE — 1125F AMNT PAIN NOTED PAIN PRSNT: CPT | Performed by: STUDENT IN AN ORGANIZED HEALTH CARE EDUCATION/TRAINING PROGRAM

## 2025-03-12 PROCEDURE — 3077F SYST BP >= 140 MM HG: CPT | Performed by: STUDENT IN AN ORGANIZED HEALTH CARE EDUCATION/TRAINING PROGRAM

## 2025-03-12 PROCEDURE — 99214 OFFICE O/P EST MOD 30 MIN: CPT | Performed by: STUDENT IN AN ORGANIZED HEALTH CARE EDUCATION/TRAINING PROGRAM

## 2025-03-12 ASSESSMENT — PAIN SCALES - GENERAL: PAINLEVEL_OUTOF10: 8

## 2025-03-12 NOTE — PROGRESS NOTES
Patient ID: Ileana Glovre is a 75 y.o. female.  Referring Physician: Jayden Good MD  [id number] [place] [address]  Primary Care Provider: Ghassan Bush MD  Visit Type: Follow Up  Diagnosis: JAK2 +ve PV     Therapy summary (diagnosis: JAK2 +ve PV):   hydrea: 1000mg daily- (+500mg weekly, 6/5/2024)    Subjective    HPI  75 y.o. 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   Previously followed by Alessandra Tripp NP, previously followed with .      Re PV:   5/2020 BMBX revealed which showed GIRISH-2 positive mutation and was diagnosed with Polycythemia Vera in conjunction with erythrocytosis. Currently asymptomatic and no hx VTE.   Did not have phlebotomies as she does not like needles. Currently on hydrea 1000mg daily, dose last increased about a year back.   Has gained 20lbs in a couple months. Has not missed any doses. Does not note h/o ARIELLE/OHS.   No supplements. Has been adherent on her medications.   Age appropriate cancer screening: last colo 1yr back WNL, mammo 1/24 WNL. No p/h/o cancer.   FMH: father had a stroke at 76yrs   Social history: never smoker, occasionally drinks, no RDA.     06/05/24: presents alone. taking 1000mg daily. Asymptomatic. Not had phleb in recent past. Ongoing weight loss 170-->163lbs  09/04/24: Continues to report episodes of falling asleep while watching TV occasionally while drinking coffee.  Her PCP is suspicious for sleep apnea.  Notices some fatigue but her degree of falling asleep appears disproportionate to the MPN alone which can cause some fatigue.  Is continuing to take her Hydrea as prescribed.  12/04/24: Presents alone, asymptomatic other than significant hip pain limiting her ability to exercise and therefore lose weight in time for her hip replacement surgery.  Notices some fatigue but she suspects this is more related to her pain from her hip issues  03/12/25:  follow up, asymptomatic other than joint issues, and labs are WNL. Has not yet met with endocrinology re weight management. No complaints re chemo.       Review of Systems - Oncology  10 point review of systems negative except as stated in HPI    Objective   Past Surgical History:   Procedure Laterality Date    KNEE ARTHROSCOPY W/ DEBRIDEMENT  12/10/2013    Arthroscopy Knee Left    OOPHORECTOMY  08/12/2014    Oophorectomy - Unilateral (Removal Of One Ovary)    OTHER SURGICAL HISTORY  03/08/2022    Tonsillectomy    OTHER SURGICAL HISTORY  01/30/2017    Wrist Surgery     Oncology History    No history exists.     /80   Pulse 56   Temp 36.4 °C (97.5 °F)   Resp 18   Wt 120 kg (265 lb)   SpO2 93%   BMI 38.57 kg/m²   Physical Exam  Vitals reviewed.   Constitutional:       General: She is not in acute distress.     Appearance: She is not toxic-appearing.   HENT:      Head: Normocephalic and atraumatic.   Eyes:      Comments:      Neck:      Comments:     Cardiovascular:      Rate and Rhythm: Normal rate.   Pulmonary:      Effort: Pulmonary effort is normal.   Abdominal:      General: There is distension.   Neurological:      General: No focal deficit present.      Mental Status: She is oriented to person, place, and time.   Psychiatric:         Mood and Affect: Mood normal.       Labs:  Lab Results   Component Value Date    WBC 5.3 03/10/2025    NEUTROABS 2.79 03/10/2025    IGABSOL 0.02 03/10/2025    LYMPHSABS 1.92 03/10/2025    MONOSABS 0.42 03/10/2025    EOSABS 0.11 03/10/2025    BASOSABS 0.03 03/10/2025    RBC 4.10 03/10/2025     (H) 03/10/2025    MCHC 33.5 03/10/2025    HGB 14.5 03/10/2025    HCT 43.3 03/10/2025     03/10/2025      Lab Results   Component Value Date    CREATININE 0.93 03/10/2025    BUN 19 03/10/2025    EGFR 64 03/10/2025     03/10/2025    K 4.9 03/10/2025     03/10/2025    CO2 29 03/10/2025      Lab Results   Component Value Date    ALT 18 03/10/2025    AST 15  03/10/2025    ALKPHOS 72 03/10/2025    BILITOT 0.5 03/10/2025       BMBx 5/2020: NORMOCELLULAR BONE MARROW (20-30%) WITH TRILINEAGE HEMATOPOIESIS, MILDLY INCREASED RETICULIN FIBROSIS AND JAK2 EXON 12 MUTATION, SEE NOTE.  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 Age-adjusted marrow hypercellularity with panmyelosis is one of the three major criteria used to  establish a diagnosis of polycythemia vera. However, WHO criteria allow for the diagnosis in the absence of bone marrow biopsy (thereby omitting the criterion of demonstrating age-adjusted hypercellularity). The patient has a reported history of sustained erythrocytosis and myeloid NGS identified JAK2 exon 12 mutation with VAF of 12%, thereby fulfilling the other two major criteria. In large population studies JAK2 V617F but not the exon 12 mutation have been identified in clonal hematopoiesis of indeterminate potential (CHIP; Yelitza N Engl J Med 2014; 371:2488-249 ). Per EHR, erythropoietin levels are at the very low end of normal, consistent with EPO-independent erythroid proliferation. The overall combination of findings in this patient are most consistent with a diagnosis of polycythemia vera.    - Flow cytometric studies identified a small CD8+ T-cell population (around 3% of events) similar to that identified previously in blood. 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 No significant marrow infiltration was identified by immunostains. The findings are NOT diagnostic for T-LGL.  -Chromosome analysis  -FISH: none  -Molecular: Myeloid NGS panel  The results will be reported separately.    Differential:           (Normal)    %    Promyelocytes         (1-5)       2.5  Myelocytes &   Metamyelocytes    (15-35)     20  Bands & Segs        (15-50)     34.5  Eosinophils               (2-4)      3  Basophils                  (0-1)       0  Lymphocytes           (5-25)     12  Monocytes                (0-2)      0  Plasma Cells             (0-2)     2  Blasts                        (0-1)     0.5  Total Erythroid        (17-35)    25.5  Number of cells counted: 200    Cellularity: Cellular              M:E Ratio: 2.5:1                                                            Electronically Signed Out By RADHA MARIN MD, PhD/CBR  By the signature on this report, the individual or group listed as making the  Final Interpretation/Diagnosis certifies that they have reviewed this case.          Addendum Diagnosis  TEST: Myeloid NGS Panel  DISEASE ASSOCIATED GENOMIC FINDINGS: JAK2 Exon 12 mutation [id number] ([id number]: [id number]) VAF: 12%  Note: The presence of a JAK2 exon 12 mutation fulfills a major WHO diagnostic criterion for establishing diagnosis of polycythemia vera (NCCN 2019).    Microscopic Description:  CBC: WBC 7.4 x 10E9/L, RBC 5.3 x 10E12/L, Hgb 16 g/dl, Hct 48.1%, MCV 91 fL, RDW 14.7%, platelets 435 x 10E9/L.  A 100 cell manual differential count reveals: polys 66%, lymphocytes 26%, monocytes 5%, eosinophils 3%.    PERIPHERAL SMEAR: Submitted             Red cells: Normal.       White cells: Occasional large granular lymphocytes and rare reactive-appearing lymphocytes.       Platelets: Normal, adequate.    ASPIRATE SMEAR: Submitted                         Specimen: Spicular.       Erythropoiesis: Normal.       Granulopoiesis: Normal.       Megakaryocytes: Present. Morphology: A subset of large and/or hyperlobulated megakaryocytes are present.    TOUCH PREP: Submitted       Specimen: Paucicellular.    ASPIRATE CLOT: Submitted                               Specimen: Aspicular.       Comments: There is a small focus degenerating non-hematopoietic cells present (<20 cells), most likely contamination.    CORE BIOPSY: Submitted                           Specimen: Adequate. Aspiration artifact present.        Cellularity: 20-30%.       Estimated M:E ratio: Consistent with aspirate smear.       Bony trabeculae: Normal.       Megakaryocytes: Adequate to slightly increased. Morphology: Occasional larger forms present. No significant clustering identified.       Granulomas: Absent.       Lymphoid aggregates: Absent.    SPECIAL STAINS:         Iron: Not able to be evaluated on aspicular clot.       Reticulin: Highlights mild increase in reticulin fobrosis (WHO grade MF-1)    IMMUNOHISTOCHEMISTRY: Performed.       CD20: Highlights scattered B-cells and a subset of cells within several  small aggregates       CD3: Highlights moderately numerous T-cells, including a subset of cells within lymphoid aggregates     CD4: Highlights a subset of T-cells; also weaky positive in moderately numerous monocytic cells       CD8: Highlights a subset of T-cells, in slight excess to CD4+ T-cells       CD56: Rare scattered positive cells       CD34: Highlights 1-2% blasts    Assessment/Plan    75 y.o. 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   Previously followed by Alessandra Tripp NP, previously followed with .       # JAK2 exon 12 mutated polycythemia vera: confirmed on BMBx. Has been on hydrea, tolerated well generally. Her dose was recently uptitrated and her disease is now better controlled.  She is generally asymptomatic other than fatigue.  Plan:  -Hydrea 1000 mg daily except on Wednesdays when she takes 1500 mg.  -folic acid, ASA 81mg   - if she has joint replacement will need post-op extended prophylaxis with anticoagulation, a DOAC would be reasonable.   - reiterated need for weight management given this is an additional risk for thrombosis along with MPN and PV.   - follow up next: 4 months   - labs prior to follow up: CBC/diff, CMP every 3 months     Jayden Higginbotham MD

## 2025-03-17 ENCOUNTER — TELEPHONE (OUTPATIENT)
Dept: ORTHOPEDIC SURGERY | Facility: CLINIC | Age: 75
End: 2025-03-17
Payer: MEDICARE

## 2025-03-17 DIAGNOSIS — M25.562 LEFT KNEE PAIN, UNSPECIFIED CHRONICITY: Primary | ICD-10-CM

## 2025-03-17 RX ORDER — HYALURONATE SODIUM 30 MG/2 ML
30 SYRINGE (ML) INTRAARTICULAR ONCE
Qty: 2 ML | Refills: 0 | Status: SHIPPED | OUTPATIENT
Start: 2025-03-17 | End: 2025-03-19 | Stop reason: WASHOUT

## 2025-03-17 NOTE — TELEPHONE ENCOUNTER
Pt. Called stating that she spoke with [facility] re: her Gel Injection Order, and they stated that they have not received it. I let her know that I will let Dr. Nelson's MA know to submit.

## 2025-03-18 ENCOUNTER — TELEPHONE (OUTPATIENT)
Dept: ORTHOPEDIC SURGERY | Facility: CLINIC | Age: 75
End: 2025-03-18
Payer: MEDICARE

## 2025-03-18 DIAGNOSIS — M17.12 PRIMARY OSTEOARTHRITIS OF LEFT KNEE: ICD-10-CM

## 2025-03-18 NOTE — TELEPHONE ENCOUNTER
Orthovisc is not covered under patients pharmacy benefits,   Prior Auth has been submitted online through [employer]  For [employer]'s preferred gel Euflexxa.   Auth is pending review.   Will contact patient once this is complete

## 2025-03-19 RX ORDER — HYALURONATE SODIUM 20 MG/2 ML
SYRINGE (ML) INTRAARTICULAR
Qty: 6 ML | Refills: 0 | Status: SHIPPED | OUTPATIENT
Start: 2025-03-19

## 2025-03-19 NOTE — TELEPHONE ENCOUNTER
Approved w/ medical benefits  Auth # [id number]  Case # [id number]  FROM 3/18/2025 TO 3/18/2026    Contacted [facility] where approval letter came from rx can be filled at any pharmacy  Contacted  [facility] to verify they have Euflexxa in stock

## 2025-03-20 ENCOUNTER — APPOINTMENT (OUTPATIENT)
Dept: PRIMARY CARE | Facility: CLINIC | Age: 75
End: 2025-03-20
Payer: MEDICARE

## 2025-03-20 VITALS
DIASTOLIC BLOOD PRESSURE: 76 MMHG | WEIGHT: 265 LBS | SYSTOLIC BLOOD PRESSURE: 132 MMHG | HEIGHT: 69 IN | BODY MASS INDEX: 39.25 KG/M2

## 2025-03-20 DIAGNOSIS — R29.818 SUSPECTED SLEEP APNEA: ICD-10-CM

## 2025-03-20 DIAGNOSIS — D45 POLYCYTHEMIA VERA: ICD-10-CM

## 2025-03-20 DIAGNOSIS — E66.01 SEVERE OBESITY (MULTI): ICD-10-CM

## 2025-03-20 DIAGNOSIS — Z00.00 ROUTINE GENERAL MEDICAL EXAMINATION AT HEALTH CARE FACILITY: Primary | ICD-10-CM

## 2025-03-20 DIAGNOSIS — Z00.00 ENCOUNTER FOR PREVENTIVE HEALTH EXAMINATION: ICD-10-CM

## 2025-03-20 DIAGNOSIS — I10 PRIMARY HYPERTENSION: ICD-10-CM

## 2025-03-20 DIAGNOSIS — Z00.00 MEDICARE ANNUAL WELLNESS VISIT, SUBSEQUENT: ICD-10-CM

## 2025-03-20 DIAGNOSIS — L98.9 SKIN LESION: ICD-10-CM

## 2025-03-20 DIAGNOSIS — E03.9 HYPOTHYROIDISM, UNSPECIFIED TYPE: ICD-10-CM

## 2025-03-20 DIAGNOSIS — E78.2 MIXED HYPERLIPIDEMIA: ICD-10-CM

## 2025-03-20 PROCEDURE — 99214 OFFICE O/P EST MOD 30 MIN: CPT | Performed by: STUDENT IN AN ORGANIZED HEALTH CARE EDUCATION/TRAINING PROGRAM

## 2025-03-20 PROCEDURE — 1160F RVW MEDS BY RX/DR IN RCRD: CPT | Performed by: STUDENT IN AN ORGANIZED HEALTH CARE EDUCATION/TRAINING PROGRAM

## 2025-03-20 PROCEDURE — 3078F DIAST BP <80 MM HG: CPT | Performed by: STUDENT IN AN ORGANIZED HEALTH CARE EDUCATION/TRAINING PROGRAM

## 2025-03-20 PROCEDURE — 1170F FXNL STATUS ASSESSED: CPT | Performed by: STUDENT IN AN ORGANIZED HEALTH CARE EDUCATION/TRAINING PROGRAM

## 2025-03-20 PROCEDURE — 1159F MED LIST DOCD IN RCRD: CPT | Performed by: STUDENT IN AN ORGANIZED HEALTH CARE EDUCATION/TRAINING PROGRAM

## 2025-03-20 PROCEDURE — 3075F SYST BP GE 130 - 139MM HG: CPT | Performed by: STUDENT IN AN ORGANIZED HEALTH CARE EDUCATION/TRAINING PROGRAM

## 2025-03-20 PROCEDURE — 99397 PER PM REEVAL EST PAT 65+ YR: CPT | Performed by: STUDENT IN AN ORGANIZED HEALTH CARE EDUCATION/TRAINING PROGRAM

## 2025-03-20 PROCEDURE — G0439 PPPS, SUBSEQ VISIT: HCPCS | Performed by: STUDENT IN AN ORGANIZED HEALTH CARE EDUCATION/TRAINING PROGRAM

## 2025-03-20 ASSESSMENT — ACTIVITIES OF DAILY LIVING (ADL)
DRESSING: INDEPENDENT
TAKING_MEDICATION: INDEPENDENT
BATHING: INDEPENDENT
MANAGING_FINANCES: INDEPENDENT
DRESSING: INDEPENDENT
DOING_HOUSEWORK: INDEPENDENT
GROCERY_SHOPPING: INDEPENDENT

## 2025-03-20 ASSESSMENT — ENCOUNTER SYMPTOMS
OCCASIONAL FEELINGS OF UNSTEADINESS: 0
DEPRESSION: 0
LOSS OF SENSATION IN FEET: 0

## 2025-03-20 NOTE — PROGRESS NOTES
" Subjective   Patient ID: Ileana Glover is a 75 y.o. female who presents for Medicare Annual Wellness Visit Subsequent.    HPI   Routine fu, Wellness exam, and yearly physical.    She has chronic knee pain, hoping to get gel injections.    Seeing multiple specialists.  Review of Systems  12-point ROS reviewed and was negative unless otherwise noted in HPI.    Objective   /76   Ht 1.753 m (5' 9\")   Wt 120 kg (265 lb)   BMI 39.13 kg/m²     Physical Exam  GEN: conversant, NAD  HEENT: PERRL, EOMI, MMM  NECK: supple, no carotid bruits appreciated b/l  CV: S1, S2, RRR  PULM: CTAB  ABD: soft, NT, ND  NEURO: no new gross focal deficits  EXT: no sig LE edema  PSYCH: appropriate affect    Assessment/Plan     #Obesity, severe, class 3: Continue lifestyle modifications, swimming regularly, weight watchers. Referral to sleep medicine for ARIELLE eval.     #Hypertension: continue olmesartan 20mg daily.      #Tinnitus: right TM clear. Offered referral to ENT on previous visit. Now resolved.     #Hypothyroidism: Maintained on levothyroxine to 150mcg daily     #Polycythemia vera/thrombocytosis: Seeing hematology. Continue hydroxyurea, ASA 81 mg daily.     #Hyperlipidemia: Continue statin.      #Hip/Knee/shoulder OA: following with Juan Nelson and [facility]. Had shoulder surgery with Dr. Sotelo in 7/2022. Had right NOEMI 9/2023. Is looking to get L-hip replaced but ortho rec continued weight loss     #Chronic pain: established with pain management - hx of neck injections with significant improvement in symptoms     #Skin lesions: seeing dermatology      #Health maintenance:   Follows with gynecology, who orders mammograms regularly. DEXA performed 2022 Tetanus vaccine 2013.  Pneumovax 2015. Prevnar 20 recommended. Colonoscopy 5/2023, 3 year fu advised. Received Shingrix and COVID-19 vaccine. Advised yearly flu shot. Longitudinal care.     RTC in 4 mo     "

## 2025-04-02 ENCOUNTER — TELEPHONE (OUTPATIENT)
Dept: ORTHOPEDIC SURGERY | Facility: CLINIC | Age: 75
End: 2025-04-02
Payer: MEDICARE

## 2025-04-02 ENCOUNTER — APPOINTMENT (OUTPATIENT)
Dept: HEMATOLOGY/ONCOLOGY | Facility: CLINIC | Age: 75
End: 2025-04-02
Payer: MEDICARE

## 2025-04-02 DIAGNOSIS — M17.12 PRIMARY OSTEOARTHRITIS OF LEFT KNEE: ICD-10-CM

## 2025-04-02 RX ORDER — HYALURONATE SODIUM 20 MG/2 ML
SYRINGE (ML) INTRAARTICULAR
Qty: 6 ML | Refills: 0 | Status: SHIPPED | OUTPATIENT
Start: 2025-04-02

## 2025-04-02 NOTE — TELEPHONE ENCOUNTER
Pt. Stated that she received an Approval letter for Gel Injections, and wanted to know next steps. I informed her that she is to speak with [facility], and they will then call our office to schedule the delivery of her Medication. She Understood.

## 2025-04-07 PROCEDURE — RXMED WILLOW AMBULATORY MEDICATION CHARGE

## 2025-04-09 ENCOUNTER — PHARMACY VISIT (OUTPATIENT)
Dept: PHARMACY | Facility: CLINIC | Age: 75
End: 2025-04-09
Payer: MEDICARE

## 2025-04-15 DIAGNOSIS — I10 PRIMARY HYPERTENSION: ICD-10-CM

## 2025-04-16 ENCOUNTER — APPOINTMENT (OUTPATIENT)
Dept: ENDOCRINOLOGY | Facility: CLINIC | Age: 75
End: 2025-04-16
Payer: MEDICARE

## 2025-04-16 VITALS
BODY MASS INDEX: 40.02 KG/M2 | HEIGHT: 69 IN | SYSTOLIC BLOOD PRESSURE: 138 MMHG | WEIGHT: 270.2 LBS | HEART RATE: 67 BPM | TEMPERATURE: 96.2 F | DIASTOLIC BLOOD PRESSURE: 82 MMHG

## 2025-04-16 DIAGNOSIS — E66.812 CLASS 2 OBESITY WITHOUT SERIOUS COMORBIDITY WITH BODY MASS INDEX (BMI) OF 39.0 TO 39.9 IN ADULT, UNSPECIFIED OBESITY TYPE: Primary | ICD-10-CM

## 2025-04-16 DIAGNOSIS — I10 PRIMARY HYPERTENSION: ICD-10-CM

## 2025-04-16 DIAGNOSIS — Z00.00 HEALTH MAINTENANCE EXAMINATION: ICD-10-CM

## 2025-04-16 DIAGNOSIS — M25.559 ARTHRALGIA OF HIP, UNSPECIFIED LATERALITY: ICD-10-CM

## 2025-04-16 PROCEDURE — 3079F DIAST BP 80-89 MM HG: CPT | Performed by: NURSE PRACTITIONER

## 2025-04-16 PROCEDURE — 3075F SYST BP GE 130 - 139MM HG: CPT | Performed by: NURSE PRACTITIONER

## 2025-04-16 PROCEDURE — 99204 OFFICE O/P NEW MOD 45 MIN: CPT | Performed by: NURSE PRACTITIONER

## 2025-04-16 PROCEDURE — 1036F TOBACCO NON-USER: CPT | Performed by: NURSE PRACTITIONER

## 2025-04-16 RX ORDER — METOPROLOL SUCCINATE 50 MG/1
TABLET, EXTENDED RELEASE ORAL
Qty: 90 TABLET | Refills: 1 | Status: SHIPPED | OUTPATIENT
Start: 2025-04-16

## 2025-04-16 ASSESSMENT — PATIENT HEALTH QUESTIONNAIRE - PHQ9
1. LITTLE INTEREST OR PLEASURE IN DOING THINGS: SEVERAL DAYS
SUM OF ALL RESPONSES TO PHQ9 QUESTIONS 1 AND 2: 2
2. FEELING DOWN, DEPRESSED OR HOPELESS: SEVERAL DAYS
10. IF YOU CHECKED OFF ANY PROBLEMS, HOW DIFFICULT HAVE THESE PROBLEMS MADE IT FOR YOU TO DO YOUR WORK, TAKE CARE OF THINGS AT HOME, OR GET ALONG WITH OTHER PEOPLE: SOMEWHAT DIFFICULT

## 2025-04-16 ASSESSMENT — ENCOUNTER SYMPTOMS
POLYDIPSIA: 0
PALPITATIONS: 0
FATIGUE: 0
POLYPHAGIA: 0
WHEEZING: 0
NERVOUS/ANXIOUS: 0
FREQUENCY: 0
NAUSEA: 0
DEPRESSION: 0
NUMBNESS: 0
DYSPHORIC MOOD: 0
CONSTIPATION: 0
LOSS OF SENSATION IN FEET: 0
SHORTNESS OF BREATH: 0
OCCASIONAL FEELINGS OF UNSTEADINESS: 1
ARTHRALGIAS: 0

## 2025-04-16 NOTE — PROGRESS NOTES
"Ileana Glover presents for weight loss management and obesity consult today.  Referred by her PCP and oncologist.  She has hip replacement pre-scheduled for the summer   Her orthopedic team desires weight loss prior to the operation she says however no specific amount noted by the patient  She notes that it is challenging to lose the weight with her hip and knee orthopedic issues which limit her mobility    She would like to attempt to lose the weight with nutrition    PMH: Obesity, HTN, OA knee and hip  Obesity History:  \"I have had a weight problem my whole life\"  Childhood or teen obesity history: yes  Age of Onset: adolescence  Lowest adult weight: 165  Highest adult weight:   Current weight: 270     Family history of obesity:     Biggest challenge with weight management: physical limitations to losin the weight, consistency     History of Weight Loss Efforts: yes  Successful weight loss techniques attempted:  nothing mentioned  Unsuccessful weight loss techniques attempted:  Atkins, Slim Fast, self-dieting    Current typical daily diet:  Typical Breakfast: toast with spray butter  Typical Lunch: apple or banana  Typical Dinner: vegetables or meat with vegetable  Soda/latte weekly intake: 1 can pop per week  Take out/carry out/fast food weekly: zero  Portion sizes/seconds with meals: small to medium: small to medium    Snacking  Daytime snacks: minimal  Evening snacks: yes      Describe Appetite (always hungry/no hunger/average): no hunger within day   Are you full after a meal?  yes  Food Noise: Yes  Emotional eater?  With fatigue  Boredom eater? No     Current Exercise Habits  decreased due to the OA of hip and knee    Sleep patterns (insomnia, waking in night) /hours of sleep per night: no ARIELLE  H/O Sleep apnea: no  Well rested? Yes     Increased Stressors (describe daily stress): no      Any intake of an appetite suppressant or an anti-obesity medicine? No     H/O Pancreatitis: no  H/O Thyroid Medullary " "Cancer: no    Medical History[1]    Current Medications[2]        Review of Systems  Review of Systems   Constitutional:  Negative for fatigue.   Respiratory:  Negative for shortness of breath and wheezing.    Cardiovascular:  Negative for chest pain and palpitations.   Gastrointestinal:  Negative for constipation and nausea.   Endocrine: Negative for polydipsia, polyphagia and polyuria.   Genitourinary:  Negative for frequency and urgency.   Musculoskeletal:  Negative for arthralgias.   Skin:  Negative for rash.   Neurological:  Negative for numbness.   Psychiatric/Behavioral:  Negative for dysphoric mood. The patient is not nervous/anxious.            Objective   /82 (BP Location: Right arm, Patient Position: Sitting, BP Cuff Size: Adult)   Pulse 67   Temp 35.7 °C (96.2 °F) (Temporal)   Ht 1.753 m (5' 9\")   Wt 123 kg (270 lb 3.2 oz)   BMI 39.90 kg/m²     Physical Exam  Physical Exam  Constitutional:       Appearance: She is obese.   Cardiovascular:      Rate and Rhythm: Normal rate.   Pulmonary:      Effort: Pulmonary effort is normal.   Neurological:      Mental Status: She is alert and oriented to person, place, and time.   Psychiatric:         Mood and Affect: Mood normal.         Behavior: Behavior normal.         Thought Content: Thought content normal.         Judgment: Judgment normal.         Lab Review  Lab Results   Component Value Date    HGBA1C 5.2 09/24/2021     Lab Results   Component Value Date    LDLCALC 82 04/23/2024       Weight Trends  Trends of weight reviewed in visit, see graph below:  Wt Readings from Last 3 Encounters:   04/16/25 123 kg (270 lb 3.2 oz)   03/20/25 120 kg (265 lb)   03/12/25 120 kg (265 lb)   (  Assessment/Plan     Follow up and Goals:  Nutrition: Consult with dietitian. Please review the Healthy Plate information. Try to aim for 20-30 grams protein per meal. High protein sources (Greek yogurt, cottage cheese, eggs, overnight oats, protein bar or shake , turkey, " tuna pack). Fairlife and Premier are options for protein drinks. Combine the protein with high fiber (berries, greens, carrots).  Portion plates  from [employer] and measuring cups are good options   Medications: Please see below options on phentermine, Qsymia, and Contrave that are non-injection options  Follow Up:  Have the dietitian initially and then begin the group visits, these can be virtual or in person  Visit length of 45 minutes      Problem List Items Addressed This Visit       Hypertension    Arthralgia of hip    Obesity, unspecified - Primary    Relevant Orders    Follow Up In Endocrinology    Referral to Nutrition Services     Other Visit Diagnoses         Health maintenance examination          BMI 40.0-44.9, adult (Multi)                Diet interventions: referral to dietitian for guidance in these changes  Discussed Mediterranean Diet, given pamphlet or it will be mailed, we looked at ADA plate, portion sizes, recipes. Advised to review in preparation for nutrition consult    Handouts given:  yes    Exercise intervention:   We discussed the importance of incorporating resistance and free weight  lifting into physical activity routine to prevent muscle wasting with weight loss, enhance bone health, the positive role increased muscle has in burning fat at rest. We looked at examples of these types of exercise routines online. Advised to do modifications. Advised to check with PCP if there is a h/o musculoskeletal injury or hx. We discussed benefits of walking with weight loss and as a cardio form of exercise. Gradually increase exercise to a goal of 150 minutes at least per week.      Follow Up:  Referred to nutrition or continue to work with current dietitian   Discussed obesity medications, side effects and mechanism of action reviewed with patient  Discussed physical activity: we reviewed [employer] videos for strength training, advised to use modifications for these videos, we discussed benefits of  strength training and resistance bands  on bone and muscle health, we discussed walking and water aerobic options for cardio  Discussed Mediterranean Diet, given pamphlet or it will be mailed, we looked at ADA plate, portion sizes, recipes. Advised to review Mediterranean Meal Plan booklet  in preparation for nutrition consult  ....  1 month group visit and nutrition visit in person or  virtual  Please reach out if you need anything or have further questions         [1]   Past Medical History:  Diagnosis Date    Essential (primary) hypertension 10/26/2022    Hypertension    Hypothyroidism, unspecified 10/26/2022    Hypothyroidism    Personal history of other diseases of the respiratory system 10/21/2022    History of acute pharyngitis    Pure hypercholesterolemia, unspecified 08/12/2014    High cholesterol   [2]   Current Outpatient Medications   Medication Sig Dispense Refill    acetaminophen (Tylenol 8 HOUR) 650 mg ER tablet Take 2 tablets (1,300 mg) by mouth every 8 hours if needed.      amoxicillin (Amoxil) 500 mg capsule Take 1 capsule (500 mg) by mouth every 12 hours.      aspirin 81 mg EC tablet Take 1 tablet (81 mg) by mouth once daily.      azithromycin (Zithromax) 250 mg tablet Take 1 tablet (250 mg) by mouth once daily.      calcium carbonate-vitamin D3 500 mg-3.125 mcg (125 unit) tablet tablet Take by mouth.      diclofenac sodium (Voltaren Arthritis Pain) 1 % gel Apply 4.5 inches (4 g) topically 4 times a day as needed (midfoot pain). 4 g 2    estradiol (Estrace) 0.01 % (0.1 mg/gram) vaginal cream Please use a dime sized or pea-sized amount vaginally 3 times a week. 42.5 g 2    folic acid (Folvite) 1 mg tablet Take 1 tablet (1 mg) by mouth once daily. 90 tablet 3    hydrocortisone 2.5 % cream once daily.      hydroxyurea (Hydrea) 500 mg capsule 2 capsules PO every day with food - Except on Wednesdays take 3 caps PO on Wednesdays 192 capsule 2     mg tablet TAKE ONE TABLET BY MOUTH EVERY 8 HOURS  with food or milk AS NEEDED      meloxicam (Mobic) 15 mg tablet TAKE ONE TABLET BY MOUTH EVERY DAY 30 tablet 0    meloxicam (Mobic) 15 mg tablet Take 1 tablet (15 mg) by mouth once daily. 30 tablet 11    metoprolol succinate XL (Toprol-XL) 50 mg 24 hr tablet TAKE 1 TABLET ONCE DAILY, DO NOT CRUSH OR CHEW (DISCARD PRESCRIPTION FOR METOPROLOL TARTRATE 25MG) 90 tablet 1    olmesartan (Benicar) 20 mg tablet Take 1 tablet (20 mg) by mouth once daily. 90 tablet 1    oxybutynin XL (Ditropan-XL) 10 mg 24 hr tablet Take 1 tablet (10 mg) by mouth 2 times a day. 180 tablet 3    polyethylene glycol-electrolytes 420 gram solution Take by mouth.      simvastatin (Zocor) 20 mg tablet TAKE 1 TABLET ONCE DAILY 90 tablet 1    sodium hyaluronate (Euflexxa) 10 mg/mL(mw 2.4 -3.6 million) injection Physician to inject 2ml into the left knee once a week for 3 weeks.  For office use only. 6 mL 0    Synthroid 150 mcg tablet TAKE 1 TABLET ONCE DAILY INTHE MORNING BEFORE A MEAL 90 tablet 1    triamcinolone (Kenalog) 0.1 % cream 0      vibegron 75 mg tablet Take 1 tablet (75 mg) by mouth once daily. 30 tablet 11    fluticasone (Flonase) 50 mcg/actuation nasal spray Administer 1 spray into each nostril once daily. Shake gently. Before first use, prime pump. After use, clean tip and replace cap. 16 g 0     No current facility-administered medications for this visit.

## 2025-04-16 NOTE — PATIENT INSTRUCTIONS
Nutrition: Consult with dietitian. Please review the Healthy Plate information. Try to aim for 20-30 grams protein per meal. High protein sources (Greek yogurt, cottage cheese, eggs, overnight oats, protein bar or shake , turkey, tuna pack). Fairlife and Premier are options for protein drinks. Combine the protein with high fiber (berries, greens, carrots).  Portion plates  from [employer] and measuring cups are good options   Medications: Please see below options on phentermine, Qsymia, and Contrave that are non-injection options  Follow Up:  Have the dietitian initially and then begin the group visits, these can be virtual or in person      [facility]: Obesity Program  Today we discussed some of the following medications.  If not prescribed already, please look into these medications on your own, and please call your insurance for coverage questions.  If you would like to contact our office with additional questions or a request for a prescription, write us via a [facility] message, or call [phone].    The following summarizes the medications currently available for weight management:    q    Phentermine (aka Adipex):   This medication is a stimulant and can suppress appetite. Common side effects include an increase in blood pressure (BP) and heart rate (HR), and excessive thirst. You will need to be able to monitor both BP and HR while on this medication. If you have any cardiac issues you may need to contact your cardiologist/PCP to get authorization to take the medication. This medication is a controlled substance in the State of [place]. Per the law, you will need to sign a stimulant consent form when taking Phentermine.  Additionally, you will need to be seen in the office (in person) every 3 months when on this medication.  Please schedule appointments in advance to ensure that we comply with the [place] Law. Phentermine is usually the most cost effective of the appetite suppressants (usually no  more than $40/month and can sometimes use GoodRX).  q Qsymia:   This is a combination of two medications: Phentermine and Topamax (aka Topiramate). Topamax is often given for migraine headaches and additionally contributes to appetite suppression.  Since phentermine is part of Qsymia, this medication combination is also considered a controlled substance.  A stimulant consent will need to be signed upon initiation of this medication. Similar to phentermine alone, Qsymia also requires an in person office visit every 3 months.  Schedule visits in advance to comply with the [place] Law.  The cost of the medication depends on the patient's individual insurance but if too costly, the medication can be sent to an online mail order pharmacy (directions below) for approximately $100/month. [email or web address]  you will need to go to this website, register in the top right hand corner. The online pharmacy will contact you for billing info and our office can electronically send the script to them.   q Contrave:  This medication is a combination of Wellbutrin (aka Buproprion) and Naltrexone. The medication combination helps to decrease appetite and sometimes cravings as well.   Contrave can cause opioid withdrawal.  Patients using chronic opioid therapy should not sure this medication.  Those who use temporary opioid therapy should hold contrave until 14 days after their last opioid dose.  When taking Contrave, a titration process is needed over the first month.   Titration:  For the first week you will take one tablet in the AM,  For the 2nd week you will take one tablet in the AM and one tablet in the PM,   For the 3rd week you will take 2 tablets in the AM and one tablet in the PM, and   For the fourth week (and onward) you will take 2 tablets in the AM and 2 tablets in the PM.     The cost of this medication depends on the individual's insurance. There are 2 options if the medication is not affordable at the  retail pharmacy:  It can be sent to a mail order pharmacy that approximates to ~ $100/month.    Mail order pharmacy- [email or web address] You will need to go to this website to register and our office will send an electronic script.  Our office can send each individual medication (Wellbutrin, Naltrexone) to your local   pharmacy.  If sending the two individual medications to your local pharmacy, the prescriptions will appear as: Wellbutrin 150mg tablet taken daily and Naltrexone 50mg- start taking half tablet for one week and then increase to one tablet daily.   q Wegovy:   This medication is a weekly injection (and actually contains the same ingredient as in Ozempic). This medication is FDA approved for weight loss.  Common side effects include nausea, vomiting, diarrhea or constipation and abdominal pain.  Often these GI side effects resolve with time.  Some patients have shared that injecting the medicine to the thigh area instead of the stomach area helps with the GI side effects.  Do not take this medication if you have a history of pancreatitis.  Additionally, do not take this medication if you or any family members have been diagnosed with Medullary Thyroid Cancer or Multiple Endocrine Neoplasia.  If insurance coverage is poor, retail price for this medication is approximately ~$1200/month.  q Zepbound:   This medication is a weekly injection (and actually contains the same ingredient as in Mounjaro). This medication is FDA approved for weight loss.  Common side effects include nausea, vomiting, diarrhea or constipation and abdominal pain.  Often these GI side effects resolve with time.  Some patients have shared that injecting the medicine to the thigh area instead of the stomach area helps with the GI side effects.  Do not take this medication if you have a history of pancreatitis.  Additionally do not take this medication if you or any family members have been diagnosed with Medullary Thyroid  Cancer or Multiple Endocrine Neoplasia.  If insurance coverage is poor, retail price for this medication is approximately ~$1200/month.  q Saxenda:   This medication is a daily injection.  This medication is FDA approved for weight loss.  Common side effects include nausea, vomiting, diarrhea or constipation and abdominal pain.  Often these GI side effects resolve with time.  Some patients have shared that injecting the medicine to the thigh area instead of the stomach area helps with the GI side effects.  Do not take this medication if you have a history of pancreatitis.  Additionally, do not take this medication if you or any family members have been diagnosed with Medullary Thyroid Cancer or Multiple Endocrine Neoplasia.  If insurance coverage is poor, retail price for this medication is approximately ~$1200/month.        q Ozempic:  This is a diabetes medication and is administered as a weekly injection. It is often NOT covered by insurance unless you are officially diagnosed with type 2 diabetes.  Common side effects include nausea, vomiting, diarrhea or constipation and abdominal pain.  Often these GI side effects resolve with time.  Some patients have shared that injecting the medicine to the thigh area instead of the stomach area helps with the GI side effects.  Do not take this medication if you have a history of pancreatitis.  Additionally, do not take this medication if you or any family members have been diagnosed with Medullary Thyroid Cancer or Multiple Endocrine Neoplasia.  If insurance coverage is poor, retail price for this medication is approximately ~$1200/month.  q Mounjaro:   This is a diabetes medication and is administered as a weekly injection. It is often NOT covered by insurance unless you are officially diagnosed with type 2 diabetes.  Common side effects include nausea, vomiting, diarrhea or constipation and abdominal pain.  Often these GI side effects resolve with time.  Some patients  have shared that injecting the medicine to the thigh area instead of the stomach area helps with the GI side effects.  Do not take this medication if you have a history of pancreatitis.  Additionally, do not take this medication if you or any family members have been diagnosed with Medullary Thyroid Cancer or Multiple Endocrine Neoplasia.  If insurance coverage is poor, retail price for this medication is approximately ~$1200/month.      Please always review the official side effect profile for the above medications with the Pharmacist if further questions arise.

## 2025-04-17 ENCOUNTER — APPOINTMENT (OUTPATIENT)
Dept: ORTHOPEDIC SURGERY | Facility: CLINIC | Age: 75
End: 2025-04-17
Payer: MEDICARE

## 2025-04-17 VITALS — WEIGHT: 270 LBS | BODY MASS INDEX: 39.87 KG/M2

## 2025-04-17 DIAGNOSIS — M16.12 ARTHRITIS OF LEFT HIP: Primary | ICD-10-CM

## 2025-04-17 DIAGNOSIS — M17.12 ARTHRITIS OF LEFT KNEE: ICD-10-CM

## 2025-04-17 PROCEDURE — 1159F MED LIST DOCD IN RCRD: CPT | Performed by: ORTHOPAEDIC SURGERY

## 2025-04-17 PROCEDURE — 20610 DRAIN/INJ JOINT/BURSA W/O US: CPT | Performed by: ORTHOPAEDIC SURGERY

## 2025-04-17 PROCEDURE — 1125F AMNT PAIN NOTED PAIN PRSNT: CPT | Performed by: ORTHOPAEDIC SURGERY

## 2025-04-17 PROCEDURE — 99214 OFFICE O/P EST MOD 30 MIN: CPT | Performed by: ORTHOPAEDIC SURGERY

## 2025-04-17 PROCEDURE — 1036F TOBACCO NON-USER: CPT | Performed by: ORTHOPAEDIC SURGERY

## 2025-04-17 PROCEDURE — 1160F RVW MEDS BY RX/DR IN RCRD: CPT | Performed by: ORTHOPAEDIC SURGERY

## 2025-04-17 ASSESSMENT — PAIN - FUNCTIONAL ASSESSMENT: PAIN_FUNCTIONAL_ASSESSMENT: 0-10

## 2025-04-17 ASSESSMENT — PAIN SCALES - GENERAL: PAINLEVEL_OUTOF10: 7

## 2025-04-17 NOTE — PROGRESS NOTES
Chief Complaint   Chief Complaint   Patient presents with    Left Knee - Follow-up         HPI:      Ileana Glover is a pleasant 75 y.o. year-old female who is seen today for 2 issues are chronic progressive left hip pain which prevents her from walking more than 1 block at a time without resting she had a right hip replacement with which she has done well she is also here for gel injections in her left knee she currently is actively losing weight through the advice of an endocrinologist    Review of Systems all other body systems have been reviewed and are negative for complaint.    There were no vitals filed for this visit.    Medical History[1]  Problem List[2]    Medication Documentation Review Audit       Reviewed by Rebekah Blanca MA (Medical Assistant) on 04/17/25 at 1311      Medication Order Taking? Sig Documenting Provider Last Dose Status   acetaminophen (Tylenol 8 HOUR) 650 mg ER tablet [id number] No Take 2 tablets (1,300 mg) by mouth every 8 hours if needed. Historical Provider, MD Taking Active   amoxicillin (Amoxil) 500 mg capsule [id number]  Take 1 capsule (500 mg) by mouth every 12 hours. Historical Provider, MD  Active   aspirin 81 mg EC tablet [id number]  Take 1 tablet (81 mg) by mouth once daily. Historical Provider, MD  Active   azithromycin (Zithromax) 250 mg tablet [id number]  Take 1 tablet (250 mg) by mouth once daily. Historical Provider, MD  Active   calcium carbonate-vitamin D3 500 mg-3.125 mcg (125 unit) tablet tablet [id number] No Take by mouth. Historical Provider, MD Taking Active   diclofenac sodium (Voltaren Arthritis Pain) 1 % gel [id number] No Apply 4.5 inches (4 g) topically 4 times a day as needed (midfoot pain). Anup Barrientos MD Taking Active   estradiol (Estrace) 0.01 % (0.1 mg/gram) vaginal cream [id number] No Please use a dime sized or pea-sized amount vaginally 3 times a week. Shawn Salazar MD Taking Active   fluticasone (Flonase) 50 mcg/actuation nasal spray [id number] No  Administer 1 spray into each nostril once daily. Shake gently. Before first use, prime pump. After use, clean tip and replace cap. Wyatt Genao,  Taking  25 2359   folic acid (Folvite) 1 mg tablet [id number]  Take 1 tablet (1 mg) by mouth once daily. Jayden Good MD  Active   hydrocortisone 2.5 % cream [id number] No once daily. Historical Provider, MD Not Taking Active   hydroxyurea (Hydrea) 500 mg capsule [id number]  2 capsules PO every day with food - Except on  take 3 caps PO on  Gregg Robles PA-C  Active    mg tablet [id number] No TAKE ONE TABLET BY MOUTH EVERY 8 HOURS with food or milk AS NEEDED Historical Provider, MD Taking Active   meloxicam (Mobic) 15 mg tablet [id number] No TAKE ONE TABLET BY MOUTH EVERY DAY Maurizio Nelson MD Not Taking Active   meloxicam (Mobic) 15 mg tablet [id number] No Take 1 tablet (15 mg) by mouth once daily. Maurizio Nelson MD Taking Active     Discontinued 25 1246   metoprolol succinate XL (Toprol-XL) 50 mg 24 hr tablet [id number]  TAKE 1 TABLET ONCE DAILY, DO NOT CRUSH OR CHEW (DISCARD PRESCRIPTION FOR METOPROLOL TARTRATE 25MG) Ghassan Bush MD  Active   olmesartan (Benicar) 20 mg tablet [id number]  Take 1 tablet (20 mg) by mouth once daily. Ghassan Bush MD  Active   oxybutynin XL (Ditropan-XL) 10 mg 24 hr tablet [id number]  Take 1 tablet (10 mg) by mouth 2 times a day. Shawn Salazar MD  Active   polyethylene glycol-electrolytes 420 gram solution [id number] No Take by mouth. Historical Provider, MD Taking Active   simvastatin (Zocor) 20 mg tablet [id number]  TAKE 1 TABLET ONCE DAILY Ghassan Bush MD  Active   sodium hyaluronate (Euflexxa) 10 mg/mL(mw 2.4 -3.6 million) injection [id number]  Physician to inject 2ml into the left knee once a week for 3 weeks.  For office use only. Maurizio Nelson MD  Active   Synthroid 150 mcg tablet [id number]  TAKE 1 TABLET ONCE DAILY INTHE MORNING BEFORE A MEAL Ghassan Bush  MD  Active   triamcinolone (Kenalog) 0.1 % cream [id number] No 0 Historical Provider, MD Not Taking Active   vibegron 75 mg tablet [id number] No Take 1 tablet (75 mg) by mouth once daily. Shawn Salazar MD Not Taking Active                    RX Allergies[3]    Social History     Socioeconomic History    Marital status:      Spouse name: Not on file    Number of children: Not on file    Years of education: Not on file    Highest education level: Not on file   Occupational History    Not on file   Tobacco Use    Smoking status: Former     Types: Cigarettes    Smokeless tobacco: Never   Vaping Use    Vaping status: Never Used   Substance and Sexual Activity    Alcohol use: Yes     Comment: social    Drug use: Not Currently    Sexual activity: Never     Birth control/protection: None   Other Topics Concern    Not on file   Social History Narrative    Not on file     Social Drivers of Health     Financial Resource Strain: Not on file   Food Insecurity: Not on file   Transportation Needs: Not on file   Physical Activity: Not on file   Stress: Not on file   Social Connections: Not on file   Intimate Partner Violence: Not on file   Housing Stability: Not on file       Surgical History[4]    Body mass index is 39.87 kg/m².    HgA1c:   Lab Results   Component Value Date    HGBA1C 5.2 09/24/2021    IXOVCYUL2R 103 09/24/2021       Physical Exam:  Constitutional: Well-developed well-nourished   Eyes: Sclerae anicteric, pupils equal and round  HENT: Normocephalic atraumatic  Cardiovascular: Pulses full, regular rate and rhythm  Respiratory: Breathing not labored, no wheezing  Integumentary: Skin intact, no lesions or rashes  Neurological: Sensation intact, no gross strength deficits, reflexes equal  Psychiatric: Alert oriented and appropriate  Hematologic/lymphatic: No lymphadenopathy  Left hip very limited painful rotation markedly antalgic gait x-rays of the hip from August show advanced arthritis joint space  narrowing sclerosis and spurring          Imaging:  See above        Impression/Plan:  End-stage hip arthritis this patient has failed nonoperative treatment for hip arthritis which has included activity modification attempts at weight loss physical therapy and oral anti-inflammatory medication.  We have discussed the potential risks of surgery including but not limited to leg length inequality, infection, DVT, neurovascular injury persistent pain, prosthetic loosening and periprosthetic fracture.  The patient wishes to proceed with surgery  Patient understands the preoperative medical evaluation and joint replacement class will be necessary.  The patient also understands that the plan is this for her to be an outpatient procedure and that appropriate arrangements must be made for supervision and assistance at home.     injection left knee  L Inj/Asp: L knee on 4/17/2025 1:27 PM  Indications: pain  Details: 21 G needle, lateral approach  Medications: 20 mg sodium hyaluronate 10 mg/mL(mw 2.4 -3.6 million)               [1]   Past Medical History:  Diagnosis Date    Essential (primary) hypertension 10/26/2022    Hypertension    Hypothyroidism, unspecified 10/26/2022    Hypothyroidism    Personal history of other diseases of the respiratory system 10/21/2022    History of acute pharyngitis    Pure hypercholesterolemia, unspecified 08/12/2014    High cholesterol   [2]   Patient Active Problem List  Diagnosis    Obesity    JAK2 positive polycythemia vera (Multi)    Hyperlipidemia    Allergic contact dermatitis due to plants, except food    Arthritis, midfoot    Internal hemorrhoids    BRBPR (bright red blood per rectum)    Carpal tunnel syndrome    Cervical radiculopathy at C6    Contracture of Achilles tendon    Distal radius fracture, left    Hemangioma of skin and subcutaneous tissue    Hypercalcemia    Hyperkalemia    Hypertension    Hypothyroidism    Inconclusive mammogram    Melanocytic nevi of scalp and neck     Osteoarthritis    Pelvic floor weakness    Peroneal tendon tear    Rash and nonspecific skin eruption    Skin changes due to chronic exposure to nonionizing radiation, unspecified    Thrombocytosis    GERD (gastroesophageal reflux disease)    Age-related osteoporosis without current pathological fracture    Unspecified rotator cuff tear or rupture of left shoulder, not specified as traumatic    Rectal hemorrhage    Swelling of lower extremity    Thumb pain    Productive cough    Polyp of colon    History of colonic polyps    History of repair of hip joint    Low back pain with sciatica    Osteopenia    Overactive bladder    Erythrocytosis    Diverticulosis of large intestine    Disorder of ankle    Constipation    Cellulitis    Bilateral primary osteoarthritis of knee    Benign neoplasm of rectum    Benign neoplasm of transverse colon    Benign neoplasm of cecum    Benign neoplasm of ascending colon    Atrophic vaginitis    Arthralgia of shoulder    Arthralgia of knee    Pain of right lower extremity    Arthralgia of hip    Pain of foot    Acute pharyngitis    Acute bacterial bronchitis    Vertigo    Urinary incontinence    Urinary urgency    Paresis of single lower extremity    Fatigue    Headache    Xerosis cutis    Bleeding hemorrhoids    Rash    Skin lesion    Gastroesophageal reflux disease    Fracture of distal end of radius    Arthritis of foot    Derangement of knee    Arthritis of knee    Allergic contact dermatitis due to plant    Presence of left artificial shoulder joint    Melanocytic nevi of left upper limb, including shoulder    Other specified disorders of bone density and structure, unspecified site    Pain in left hip    Personal history of nicotine dependence    Other seborrheic keratosis    Pain in right shoulder    Other long term (current) drug therapy    Pain in left shoulder    Melanocytic nevi of other parts of face    Obesity, unspecified    Low back pain, unspecified    Contact with and  (suspected) exposure to covid-19    Genetic susceptibility to other disease    Long term (current) use of aspirin    Bradycardia, unspecified    Bloody stool    Knee pain    Pain in right hip    Melanocytic nevi of trunk    Melanocytic nevi of unspecified upper limb, including shoulder    Pain in right lower leg    Other spondylosis with radiculopathy, cervical region    Obesity with body mass index 30 or greater    Severe obesity (Multi)    Pelvic floor dysfunction    Plantar fasciitis    Injury of tendon of lower extremity    Thrombocythemia    Melanocytic nevus of skin   [3]   Allergies  Allergen Reactions    Sulfa (Sulfonamide Antibiotics) Rash   [4]   Past Surgical History:  Procedure Laterality Date    KNEE ARTHROSCOPY W/ DEBRIDEMENT  12/10/2013    Arthroscopy Knee Left    OOPHORECTOMY  08/12/2014    Oophorectomy - Unilateral (Removal Of One Ovary)    OTHER SURGICAL HISTORY  03/08/2022    Tonsillectomy    OTHER SURGICAL HISTORY  01/30/2017    Wrist Surgery

## 2025-04-18 ENCOUNTER — TELEPHONE (OUTPATIENT)
Dept: ADMISSION | Facility: HOSPITAL | Age: 75
End: 2025-04-18
Payer: MEDICARE

## 2025-04-18 DIAGNOSIS — D45 POLYCYTHEMIA VERA: ICD-10-CM

## 2025-04-21 RX ORDER — FOLIC ACID 1 MG/1
1 TABLET ORAL DAILY
Qty: 90 TABLET | Refills: 0 | Status: SHIPPED | OUTPATIENT
Start: 2025-04-21

## 2025-04-21 NOTE — TELEPHONE ENCOUNTER
Pt notified that prescription was sent to local pharmacy.  She will pay cash price for medication.

## 2025-04-24 ENCOUNTER — APPOINTMENT (OUTPATIENT)
Dept: ORTHOPEDIC SURGERY | Facility: CLINIC | Age: 75
End: 2025-04-24
Payer: MEDICARE

## 2025-04-24 DIAGNOSIS — M17.12 ARTHRITIS OF LEFT KNEE: Primary | ICD-10-CM

## 2025-04-24 PROCEDURE — 1036F TOBACCO NON-USER: CPT | Performed by: ORTHOPAEDIC SURGERY

## 2025-04-24 PROCEDURE — 1159F MED LIST DOCD IN RCRD: CPT | Performed by: ORTHOPAEDIC SURGERY

## 2025-04-24 PROCEDURE — 20610 DRAIN/INJ JOINT/BURSA W/O US: CPT | Performed by: ORTHOPAEDIC SURGERY

## 2025-04-24 NOTE — PROGRESS NOTES
L Inj/Asp: L knee on 4/24/2025 8:46 AM  Indications: pain  Details: 21 G needle, lateral approach  Medications: 20 mg sodium hyaluronate 10 mg/mL(mw 2.4 -3.6 million)

## 2025-04-28 ENCOUNTER — NUTRITION (OUTPATIENT)
Dept: ENDOCRINOLOGY | Facility: CLINIC | Age: 75
End: 2025-04-28
Payer: MEDICARE

## 2025-04-28 ENCOUNTER — APPOINTMENT (OUTPATIENT)
Dept: ENDOCRINOLOGY | Facility: CLINIC | Age: 75
End: 2025-04-28
Payer: MEDICARE

## 2025-04-28 VITALS — WEIGHT: 268 LBS | HEIGHT: 69 IN | BODY MASS INDEX: 39.69 KG/M2

## 2025-04-28 DIAGNOSIS — Z71.3 DIETARY COUNSELING: Primary | ICD-10-CM

## 2025-04-28 DIAGNOSIS — E66.812 CLASS 2 OBESITY WITHOUT SERIOUS COMORBIDITY WITH BODY MASS INDEX (BMI) OF 39.0 TO 39.9 IN ADULT, UNSPECIFIED OBESITY TYPE: ICD-10-CM

## 2025-04-28 NOTE — PATIENT INSTRUCTIONS
- Please refer to your book entitled: Your Mediterranean Meal Plan, and follow Mediterranean Diet eating guidelines as reviewed.  - The Healthy Plate style of eating can be a helpful tool for incorporating healthy balanced meals in appropriate portions. (Healthy Plate: Start with a 9-inch diameter plate. Fill 1/2 the plate with non-starchy vegetables, 1/4 of the plate with whole grains or starchy vegetables, and 1/4 of the plate with a lean source of protein.   - Please aim for a source of healthy protein and fiber rich foods at meals as discussed for nutrition needs as well as to help provide better satiety at meals.   - Consider pre-planning healthy meals for the week. Refer to your book for both menu and recipe ideas to get you started.  - Incorporate strategies of mindful eating every day. Practice staying in tune with your body's hunger cues and eat only when truly hungry. Avoid emotional eating/eating when not hungry.  - Aim for 64 ounces of water daily.  - Continue with exercise per PT recommendations.   - Follow-up as scheduled for the group classes with MARIELY Solo.  - Follow-up with nutrition in 4-6 weeks.

## 2025-04-28 NOTE — PROGRESS NOTES
"Initial Nutrition Assessment    Patient was referred to nutrition by MARIELY Solo  for weight management/desire to lose weight, as well as for education on healthy eating. Other PMHX significant for HLD, HTN, osteopenia, hypothyroidism, and GERD.    Diet recall reveals a consistent meal pattern with rare missed meals; however, reported intakes of larger portions and calorically dense foods all likely contributing to lack of desired weight loss/contributing to weight gain over time. Fluids meeting recommendations in type and amount with water as primary beverage. Pt is not incorporating consistent physical activity at this time and would likely benefit from increased structured activity to assist in achieving goals.     See all interventions/recommendations below as discussed during visit this day.     Patient reported symptoms: Difficulty losing weight    Anthropometrics:  Height:   Ht Readings from Last 1 Encounters:   04/16/25 1.753 m (5' 9\")      Weight:   Wt Readings from Last 10 Encounters:   04/17/25 122 kg (270 lb)   04/16/25 123 kg (270 lb 3.2 oz)   03/20/25 120 kg (265 lb)   03/12/25 120 kg (265 lb)   01/31/25 121 kg (266 lb 8 oz)   12/12/24 122 kg (268 lb)   11/14/24 121 kg (267 lb)   11/12/24 121 kg (267 lb 9.6 oz)   09/04/24 119 kg (262 lb 0.3 oz)   08/30/24 117 kg (258 lb)      Current BMI:   BMI Readings from Last 1 Encounters:   04/17/25 39.87 kg/m²        Labs:  Lab Results   Component Value Date    HGBA1C 5.2 09/24/2021      Lab Results   Component Value Date    CHOL 172 04/23/2024    CHOL 183 09/12/2023    CHOL 212 (H) 09/22/2022     Lab Results   Component Value Date    HDL 60.2 04/23/2024    HDL 61.6 09/12/2023    HDL 74.0 09/22/2022     Lab Results   Component Value Date    LDLCALC 82 04/23/2024     Lab Results   Component Value Date    TRIG 150 (H) 04/23/2024    TRIG 93 09/12/2023    TRIG 118 09/22/2022       Malnutrition Screening:  Significant Unintentional weight loss: No  Eating " less than 75% of usual intake for more than 2 weeks: No  Potential Signs of Inflammation: No    Recommended Malnutrition Diagnosis: No malnutrition identified    Diet Recall-  Breakfast- veggie made great frittata or a pure/think protein bar  Lunch- skips or protein bar   Dinner- chicken or tuna over salad kits or baked potato/froze/ veg   Daily Snacks- fruit- apple banana or grapes,    Beverages- 2 -8 oz cups of coffee, 8 cups of water with crystal light, diet coke on occasion.    Alcohol- social    Physical Activity- PT three times per day     Other pertinent patient reported Information:  - Past weight loss attempts include: WW, atkins, slim fast, nutri system but nothing lasted. Currently on WW- 27 pts.   - Pt reports that she is planning to have hip replacement this summer   - PT 3 days per week   -Batch cook proteins   -Reports that she recently took cookies/ chips/crackers out of the house x 2 weeks     Nutrition Diagnosis: Overweight/Obesity related to food-and-nutrition-related knowledge deficit as evidenced by BMI above normative standard for age and gender.    Readiness to Learn:  Cognitive ability: Alert and oriented  Motivation to learn: Eager and Interested  Family Support: Unable to assess- family not present  Instruction provided to: Patient  Patient learns best by: Multiple methods  Factors affecting learning: None   Physical limitations affecting learning: None    Education Materials Provided:   Your Mediterranean Meal Plan Booklet    Nutrition Interventions/Recommendations for 4/28/2025:  - Please refer to your book entitled: Your Mediterranean Meal Plan, and follow Mediterranean Diet eating guidelines as reviewed.  - The Healthy Plate style of eating can be a helpful tool for incorporating healthy balanced meals in appropriate portions. (Healthy Plate: Start with a 9-inch diameter plate. Fill 1/2 the plate with non-starchy vegetables, 1/4 of the plate with whole grains or starchy vegetables, and  1/4 of the plate with a lean source of protein.   - Please aim for a source of healthy protein and fiber rich foods at meals as discussed for nutrition needs as well as to help provide better satiety at meals.   - Consider pre-planning healthy meals for the week. Refer to your book for both menu and recipe ideas to get you started.  - Incorporate strategies of mindful eating every day. Practice staying in tune with your body's hunger cues and eat only when truly hungry. Avoid emotional eating/eating when not hungry.  - Aim for 64 ounces of water daily.  - Continue with exercise per PT recommendations.   - Follow-up as scheduled for the group classes with MARIELY Solo.  - Follow-up with nutrition in 4-6 weeks.      Nutrition Monitoring & Evaluation: adherence to recommendations and patient stated goals    Need for follow-up: Individual Nutrition Visit in 4-6 weeks    Referred by: MARIELY Solo     MNCARLOS A Billing Type: Medical Nutrition Assessment, each 15 min increment, for 3 increments.    SIGNATURE:   Iwona Bland MS, RD, LD                                                      DATE:   4/28/2025

## 2025-05-01 ENCOUNTER — APPOINTMENT (OUTPATIENT)
Dept: ORTHOPEDIC SURGERY | Facility: CLINIC | Age: 75
End: 2025-05-01
Payer: MEDICARE

## 2025-05-01 DIAGNOSIS — M17.12 ARTHRITIS OF LEFT KNEE: Primary | ICD-10-CM

## 2025-05-01 PROCEDURE — 1036F TOBACCO NON-USER: CPT | Performed by: ORTHOPAEDIC SURGERY

## 2025-05-01 PROCEDURE — 1159F MED LIST DOCD IN RCRD: CPT | Performed by: ORTHOPAEDIC SURGERY

## 2025-05-01 PROCEDURE — 20610 DRAIN/INJ JOINT/BURSA W/O US: CPT | Performed by: ORTHOPAEDIC SURGERY

## 2025-05-01 NOTE — PROGRESS NOTES
L Inj/Asp: L knee on 5/1/2025 1:14 PM  Indications: pain  Details: 21 G needle, lateral approach  Medications: 20 mg sodium hyaluronate 10 mg/mL(mw 2.4 -3.6 million)

## 2025-05-02 ENCOUNTER — SPECIALTY PHARMACY (OUTPATIENT)
Dept: PHARMACY | Facility: CLINIC | Age: 75
End: 2025-05-02

## 2025-05-02 DIAGNOSIS — M17.12 PRIMARY OSTEOARTHRITIS OF LEFT KNEE: ICD-10-CM

## 2025-05-02 RX ORDER — HYALURONATE SODIUM 20 MG/2 ML
SYRINGE (ML) INTRAARTICULAR
Qty: 6 ML | Refills: 0 | Status: SHIPPED | OUTPATIENT
Start: 2025-05-02

## 2025-05-08 ENCOUNTER — SPECIALTY PHARMACY (OUTPATIENT)
Dept: PHARMACY | Facility: CLINIC | Age: 75
End: 2025-05-08

## 2025-05-14 ENCOUNTER — SPECIALTY PHARMACY (OUTPATIENT)
Dept: PHARMACY | Facility: CLINIC | Age: 75
End: 2025-05-14

## 2025-05-21 DIAGNOSIS — E03.9 HYPOTHYROIDISM, UNSPECIFIED TYPE: ICD-10-CM

## 2025-05-22 RX ORDER — LEVOTHYROXINE SODIUM 150 UG/1
150 TABLET ORAL EVERY MORNING
Qty: 90 TABLET | Refills: 3 | Status: SHIPPED | OUTPATIENT
Start: 2025-05-22

## 2025-05-27 ENCOUNTER — TELEPHONE (OUTPATIENT)
Dept: ADMISSION | Facility: HOSPITAL | Age: 75
End: 2025-05-27
Payer: MEDICARE

## 2025-05-27 DIAGNOSIS — D45 POLYCYTHEMIA VERA: ICD-10-CM

## 2025-05-27 RX ORDER — HYDROXYUREA 500 MG/1
CAPSULE ORAL
Qty: 192 CAPSULE | Refills: 2 | Status: SHIPPED | OUTPATIENT
Start: 2025-05-27

## 2025-05-30 ENCOUNTER — TELEPHONE (OUTPATIENT)
Dept: ORTHOPEDIC SURGERY | Facility: CLINIC | Age: 75
End: 2025-05-30
Payer: MEDICARE

## 2025-05-30 DIAGNOSIS — M16.11 PRIMARY OSTEOARTHRITIS OF RIGHT HIP: Primary | ICD-10-CM

## 2025-05-30 NOTE — TELEPHONE ENCOUNTER
Patient called in to schedule her surgery. I do not see that an order was put in for her surgery. Can we place an order for this patient's procedure?

## 2025-06-01 DIAGNOSIS — I10 PRIMARY HYPERTENSION: ICD-10-CM

## 2025-06-01 ASSESSMENT — DERMATOLOGY QUALITY OF LIFE (QOL) ASSESSMENT
RATE HOW EMOTIONALLY BOTHERED YOU ARE BY YOUR SKIN PROBLEM (FOR EXAMPLE, WORRY, EMBARRASSMENT, FRUSTRATION): 0 - NEVER BOTHERED
RATE HOW BOTHERED YOU ARE BY SYMPTOMS OF YOUR SKIN PROBLEM (EG, ITCHING, STINGING BURNING, HURTING OR SKIN IRRITATION): 0 - NEVER BOTHERED
RATE HOW BOTHERED YOU ARE BY EFFECTS OF YOUR SKIN PROBLEMS ON YOUR ACTIVITIES (EG, GOING OUT, ACCOMPLISHING WHAT YOU WANT, WORK ACTIVITIES OR YOUR RELATIONSHIPS WITH OTHERS): 0 - NEVER BOTHERED
DATE THE QUALITY-OF-LIFE ASSESSMENT WAS COMPLETED: 67358
WHAT SINGLE SKIN CONDITION LISTED BELOW IS THE PATIENT ANSWERING THE QUALITY-OF-LIFE ASSESSMENT QUESTIONS ABOUT: NONE OF THE ABOVE
RATE HOW EMOTIONALLY BOTHERED YOU ARE BY YOUR SKIN PROBLEM (FOR EXAMPLE, WORRY, EMBARRASSMENT, FRUSTRATION): 0 - NEVER BOTHERED
WHAT SINGLE SKIN CONDITION LISTED BELOW IS THE PATIENT ANSWERING THE QUALITY-OF-LIFE ASSESSMENT QUESTIONS ABOUT: NONE OF THE ABOVE
RATE HOW BOTHERED YOU ARE BY EFFECTS OF YOUR SKIN PROBLEMS ON YOUR ACTIVITIES (EG, GOING OUT, ACCOMPLISHING WHAT YOU WANT, WORK ACTIVITIES OR YOUR RELATIONSHIPS WITH OTHERS): 0 - NEVER BOTHERED
RATE HOW BOTHERED YOU ARE BY SYMPTOMS OF YOUR SKIN PROBLEM (EG, ITCHING, STINGING BURNING, HURTING OR SKIN IRRITATION): 0 - NEVER BOTHERED

## 2025-06-02 ENCOUNTER — APPOINTMENT (OUTPATIENT)
Dept: ENDOCRINOLOGY | Facility: CLINIC | Age: 75
End: 2025-06-02
Payer: MEDICARE

## 2025-06-02 VITALS — WEIGHT: 262 LBS | HEIGHT: 69 IN | BODY MASS INDEX: 38.8 KG/M2

## 2025-06-02 DIAGNOSIS — E66.812 CLASS 2 OBESITY DUE TO EXCESS CALORIES WITHOUT SERIOUS COMORBIDITY WITH BODY MASS INDEX (BMI) OF 38.0 TO 38.9 IN ADULT: Primary | ICD-10-CM

## 2025-06-02 DIAGNOSIS — E66.09 CLASS 2 OBESITY DUE TO EXCESS CALORIES WITHOUT SERIOUS COMORBIDITY WITH BODY MASS INDEX (BMI) OF 38.0 TO 38.9 IN ADULT: Primary | ICD-10-CM

## 2025-06-02 DIAGNOSIS — Z71.3 DIETARY COUNSELING: ICD-10-CM

## 2025-06-02 RX ORDER — OLMESARTAN MEDOXOMIL 20 MG/1
20 TABLET ORAL DAILY
Qty: 90 TABLET | Refills: 0 | Status: SHIPPED | OUTPATIENT
Start: 2025-06-02

## 2025-06-02 NOTE — PROGRESS NOTES
Follow-up Nutrition Assessment    Interval History: Patient presents for follow-up nutrition appointment for weight management/desire to lose weight. Since last nutrition visit on 4/28/2025, pt with a 6 lbs weight loss.     Diet recall reveals a consistent meal pattern with rare skipped meals, as well as recent implementation of well balanced meals with attention to consistent lean protein, fruits/vegetables/complex CHO, and healthy fat food sources in appropriate portions to assist in achieving goals. Fluids meeting recommendations in type and amount with water as primary beverage. Pt is not incorporating consistent physical activity at this time and would likely benefit from increased structured activity to assist in achieving goals.      Nutrition Interventions/Recommendations from last visit scheduled on 4/28/2025:  - Please refer to your book entitled: Your Mediterranean Meal Plan, and follow Mediterranean Diet eating guidelines as reviewed.  - The Healthy Plate style of eating can be a helpful tool for incorporating healthy balanced meals in appropriate portions. (Healthy Plate: Start with a 9-inch diameter plate. Fill 1/2 the plate with non-starchy vegetables, 1/4 of the plate with whole grains or starchy vegetables, and 1/4 of the plate with a lean source of protein.   - Please aim for a source of healthy protein and fiber rich foods at meals as discussed for nutrition needs as well as to help provide better satiety at meals.   - Consider pre-planning healthy meals for the week. Refer to your book for both menu and recipe ideas to get you started.  - Incorporate strategies of mindful eating every day. Practice staying in tune with your body's hunger cues and eat only when truly hungry. Avoid emotional eating/eating when not hungry.  - Aim for 64 ounces of water daily.  - Continue with exercise per PT recommendations.   - Follow-up as scheduled for the group classes with MARIELY Solo.  - Follow-up  "with nutrition in 4-6 weeks.      Adherence to recommendations and patient stated goals: Partially met goals    See all interventions/recommendations below as discussed during visit this day.     Anthropometrics:  Height:   Ht Readings from Last 1 Encounters:   04/28/25 1.753 m (5' 9\")      Weight:   Wt Readings from Last 10 Encounters:   04/28/25 122 kg (268 lb)   04/17/25 122 kg (270 lb)   04/16/25 123 kg (270 lb 3.2 oz)   03/20/25 120 kg (265 lb)   03/12/25 120 kg (265 lb)   01/31/25 121 kg (266 lb 8 oz)   12/12/24 122 kg (268 lb)   11/14/24 121 kg (267 lb)   11/12/24 121 kg (267 lb 9.6 oz)   09/04/24 119 kg (262 lb 0.3 oz)      Current BMI:   BMI Readings from Last 1 Encounters:   04/28/25 39.58 kg/m²      Malnutrition Screening:  Significant Unintentional weight loss: No  Eating less than 75% of usual intake for more than 2 weeks: No  Potential Signs of Inflammation: No     Recommended Malnutrition Diagnosis: No malnutrition identified     Diet Recall-  Breakfast- Linda killer bread, 1/2 avocado, 1 egg plus a veggie made great frittata  Lunch- Tuna salad with cheese, Linda killer bread OR yogurt bowl   Dinner- chicken salad kits or protein with baked potato or pasta and some type of veg. Chili or Pizza   Daily Snacks- Premier protein shakes   Beverages- 2 -8 oz cups of coffee, 8 cups of water with crystal light, diet coke on occasion.     Alcohol- social     Physical Activity- 15-30 minutes peddler 3 times per week, plus PT exercises daily      Other pertinent patient reported Information:  - No longer on WW, using MyFitnessPal- 1440 kcals and 72 gm protein  - Pt reports that she is planning to have hip replacement this summer possibly this month.   -Continues to batch cook proteins   -Reports that she recently took cookies/ chips/crackers out of the house x 2 weeks      Nutrition Diagnosis: Overweight/Obesity related to food-and-nutrition-related knowledge deficit as evidenced by BMI above normative standard " for age and gender.     Readiness to Learn:  Cognitive ability: Alert and oriented  Motivation to learn: Eager and Interested  Family Support: Unable to assess- family not present  Instruction provided to: Patient  Patient learns best by: Multiple methods  Factors affecting learning: None   Physical limitations affecting learning: None    Education Materials Provided:   None    Nutrition Interventions/Recommendations for 6/2/2025:  - Please refer to your book entitled: Your Mediterranean Meal Plan, and follow Mediterranean Diet eating guidelines as reviewed.  - The Healthy Plate style of eating can be a helpful tool for incorporating healthy balanced meals in appropriate portions. (Healthy Plate: Start with a 9-inch diameter plate. Fill 1/2 the plate with non-starchy vegetables, 1/4 of the plate with whole grains or starchy vegetables, and 1/4 of the plate with a lean source of protein.   - Please aim for a source of healthy protein and fiber rich foods at meals as discussed for nutrition needs as well as to help provide better satiety at meals.   - Aim for 64 ounces of water daily.  - Continue with exercise per PT recommendations.   - Follow-up with nutrition in 6 weeks.      Nutrition Monitoring & Evaluation: adherence to recommendations and patient stated goals    Need for follow-up: As scheduled for MARIELY Solo Shared Medical Appointment ([facility]). Follow-up with nutrition in 6 weeks.      Referred by: MARIELY Solo     [facility] Billing Type: Medical Nutrition Re-Assessment, each 15 min increment, for 3 increments.    SIGNATURE:   Iwona Bland MS, RD, LD                                                      DATE:   6/2/2025

## 2025-06-02 NOTE — PATIENT INSTRUCTIONS
- Please refer to your book entitled: Your Mediterranean Meal Plan, and follow Mediterranean Diet eating guidelines as reviewed.  - The Healthy Plate style of eating can be a helpful tool for incorporating healthy balanced meals in appropriate portions. (Healthy Plate: Start with a 9-inch diameter plate. Fill 1/2 the plate with non-starchy vegetables, 1/4 of the plate with whole grains or starchy vegetables, and 1/4 of the plate with a lean source of protein.   - Please aim for a source of healthy protein and fiber rich foods at meals as discussed for nutrition needs as well as to help provide better satiety at meals.   - Aim for 64 ounces of water daily.  - Continue with exercise per PT recommendations.   - Follow-up with nutrition in 4-6 weeks.

## 2025-06-03 PROBLEM — M16.12 ARTHRITIS OF LEFT HIP: Status: ACTIVE | Noted: 2025-05-01

## 2025-06-03 NOTE — PROGRESS NOTES
Subjective     Ileana Glover is a 75 y.o. female who presents for the following: Skin Check.  She states she has not noticed any changes in any of her brown spots recently, including in size, shape, or color, and they are all asymptomatic with no associated bleeding, itching, or pain.  She also notes red, irritated patches underneath her breasts.  She denies any other new, changing, or concerning skin lesions; no bleeding, itching, or burning lesions.      Review of Systems:  No other skin or systemic complaints other than what is documented elsewhere in the note.    The following portions of the chart were reviewed this encounter and updated as appropriate:       Skin Cancer History  Biopsy Log Book  No skin cancers from Specimen Tracking.    Additional History      Specialty Problems          Dermatology Problems    Melanocytic nevi of left upper limb, including shoulder    Melanocytic nevi of other parts of face    Melanocytic nevi of trunk    Melanocytic nevi of unspecified upper limb, including shoulder    Other seborrheic keratosis    Hemangioma of skin and subcutaneous tissue    Melanocytic nevi of scalp and neck    Melanocytic nevus of skin    Rash    Rash and nonspecific skin eruption    Skin changes due to chronic exposure to nonionizing radiation, unspecified    Xerosis cutis    Skin lesion       Past Dermatologic / Past Relevant Medical History:    - history of polycythemia vera, currently on hydroxyurea  - no history of atypical nevi or skin cancer    Family History:    No family history of melanoma or skin cancer    Social History:    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    A full examination was performed including scalp, face, eyes, ears, nose, lips, neck, chest,  axillae, abdomen, back, bilateral upper extremities, and bilateral lower extremities. All findings within normal limits unless otherwise noted below.    Assessment/Plan   Skin Exam  1. NEOPLASM OF UNCERTAIN BEHAVIOR OF SKIN (2)  Left Upper Back  5 mm dark brown pigmented, asymmetric macule with an asymmetric pigment network and irregular borders     Shave removal    Lesion length (cm):  0.5  Lesion width (cm):  0.5  Margin per side (cm):  0.2  Lesion diameter (cm):  0.9  Informed consent: discussed and consent obtained    Timeout: patient name, date of birth, surgical site, and procedure verified    Procedure prep:  Patient was prepped and draped  Anesthesia: the lesion was anesthetized in a standard fashion    Anesthetic:  1% lidocaine w/ epinephrine 1-100,000 local infiltration  Instrument used: flexible razor blade    Hemostasis achieved with: aluminum chloride    Outcome: patient tolerated procedure well    Post-procedure details: sterile dressing applied and wound care instructions given    Dressing type: bandage and petrolatum    Specimen 1 - Dermatopathology- DERM LAB  Differential Diagnosis: r/o dysplastic nevus  Check Margins Yes/No?:    Comments:    Dermpath Lab: Routine Histopathology (formalin-fixed tissue)  Right Medial Mid Back  7 mm dark brown pigmented, asymmetric pacule with an asymmetric pigment network and irregular borders     Shave removal    Lesion length (cm):  0.7  Lesion width (cm):  0.7  Margin per side (cm):  0.2  Lesion diameter (cm):  1.1  Informed consent: discussed and consent obtained    Timeout: patient name, date of birth, surgical site, and procedure verified    Procedure prep:  Patient was prepped and draped  Anesthesia: the lesion was anesthetized in a standard fashion    Anesthetic:  1% lidocaine w/ epinephrine 1-100,000 local infiltration  Instrument used: flexible razor blade    Hemostasis achieved with: aluminum chloride    Outcome: patient tolerated procedure well     Post-procedure details: sterile dressing applied and wound care instructions given    Dressing type: bandage and petrolatum    Specimen 2 - Dermatopathology- DERM LAB  Differential Diagnosis: r/o dysplastic nevus  Check Margins Yes/No?:    Comments:    Dermpath Lab: Routine Histopathology (formalin-fixed tissue)  2. INTERTRIGO  Right Inframammary Fold  In the patient's bilateral infra-mammary folds, there are red, moist, slightly scaly patches located in skin folds.  Intertrigo - bilateral infra-mammary folds.  The potentially chronic and intermittently flaring nature of this condition, which likely involves colonization with Candidal yeast, and treatment options were discussed extensively with the patient today.  At this time, we recommend topical anti-fungal therapy with Zeasorb AF powder, which she was instructed to apply to the affected areas once or twice daily. The risks, benefits, and side effects of this medication were discussed.  The patient expressed understanding and is in agreement with this plan.  3. MELANOCYTIC NEVUS, UNSPECIFIED LOCATION  Generalized  Scattered on the patient's face, neck, trunk, and extremities, there are multiple small, round- to oval-shaped, brown-pigmented and pink-colored, symmetric, uniform-appearing macules and dome-shaped papules  Clinically benign- to slightly atypical-appearing nevi - the clinically benign- to slightly atypical-appearing nature of the remainder of the patient's nevi was discussed with the patient today.  None of the patient's nevi, with the exception of the 2 noted above, meet threshold for biopsy today.  We emphasized the importance of performing monthly self-skin exams using the ABCDs of monitoring moles, which were reviewed with the patient today and an informational hand-out provided. We also emphasized the importance of sun avoidance and sun protection with daily sunscreen use.  4. SEBORRHEIC KERATOSIS  Generalized  Scattered on the patient's face,  neck, trunk, and extremities, there are multiple tan- to light brown-colored, hyperkeratotic, stuck-on appearing papules of varying size and shape  Seborrheic Keratoses - the benign nature of these lesions was discussed with the patient today and reassurance provided.  No treatment is medically indicated for these lesions at this time.  5. HEMANGIOMA OF SKIN  Generalized  Scattered on the patient's face, neck, trunk, and extremities, there are multiple small, round, cherry red- to purplish-colored, symmetric, uniform, vascular-appearing macules and papules  Cherry Angiomas - the benign nature of these vascular lesions was discussed with the patient today and reassurance provided.  No treatment is medically indicated for these lesions at this time.  6. DIFFUSE PHOTODAMAGE OF SKIN  Generalized  Diffuse photodamage with actinic changes with telangiectasia and mottled pigmentation in sun-exposed areas.  Photodamage.  The signs and symptoms of skin cancer were reviewed and the patient was advised to practice sun protection and sun avoidance, use daily sunscreen, and perform regular self skin exams.  Sun protection was discussed, including avoiding the mid-day sun, wearing a sunscreen with SPF at least 50, and stressing the need for reapplication of sunscreen and applying more than they think they need.         Luca Mar DO  PGY-4 Dermatology      I saw and evaluated the patient. I personally obtained the key and critical portions of the history and physical exam or was physically present for key and critical portions performed by the resident/fellow. I reviewed the resident/fellow's documentation and discussed the patient with the resident/fellow. I agree with the resident/fellow's medical decision making as documented in the note.    Cedrick Le MD         [1]   Current Outpatient Medications:     acetaminophen (Tylenol 8 HOUR) 650 mg ER tablet, Take 2 tablets (1,300 mg) by mouth every 8 hours if needed., Disp: ,  Rfl:     amoxicillin (Amoxil) 500 mg capsule, Take 1 capsule (500 mg) by mouth every 12 hours., Disp: , Rfl:     aspirin 81 mg EC tablet, Take 1 tablet (81 mg) by mouth once daily., Disp: , Rfl:     azithromycin (Zithromax) 250 mg tablet, Take 1 tablet (250 mg) by mouth once daily., Disp: , Rfl:     calcium carbonate-vitamin D3 500 mg-3.125 mcg (125 unit) tablet tablet, Take by mouth., Disp: , Rfl:     diclofenac sodium (Voltaren Arthritis Pain) 1 % gel, Apply 4.5 inches (4 g) topically 4 times a day as needed (midfoot pain)., Disp: 4 g, Rfl: 2    estradiol (Estrace) 0.01 % (0.1 mg/gram) vaginal cream, Please use a dime sized or pea-sized amount vaginally 3 times a week., Disp: 42.5 g, Rfl: 2    folic acid (Folvite) 1 mg tablet, Take 1 tablet (1 mg) by mouth once daily., Disp: 90 tablet, Rfl: 0    hydrocortisone 2.5 % cream, once daily., Disp: , Rfl:     hydroxyurea (Hydrea) 500 mg capsule, 2 capsules PO every day with food - Except on Wednesdays take 3 caps PO on Wednesdays, Disp: 192 capsule, Rfl: 2     mg tablet, TAKE ONE TABLET BY MOUTH EVERY 8 HOURS with food or milk AS NEEDED, Disp: , Rfl:     levothyroxine (Synthroid) 150 mcg tablet, TAKE 1 TABLET ONCE DAILY INTHE MORNING BEFORE A MEAL, Disp: 90 tablet, Rfl: 3    meloxicam (Mobic) 15 mg tablet, TAKE ONE TABLET BY MOUTH EVERY DAY, Disp: 30 tablet, Rfl: 0    meloxicam (Mobic) 15 mg tablet, Take 1 tablet (15 mg) by mouth once daily., Disp: 30 tablet, Rfl: 11    metoprolol succinate XL (Toprol-XL) 50 mg 24 hr tablet, TAKE 1 TABLET ONCE DAILY, DO NOT CRUSH OR CHEW (DISCARD PRESCRIPTION FOR METOPROLOL TARTRATE 25MG), Disp: 90 tablet, Rfl: 1    olmesartan (BENIcar) 20 mg tablet, TAKE 1 TABLET ONCE DAILY, Disp: 90 tablet, Rfl: 0    oxybutynin XL (Ditropan-XL) 10 mg 24 hr tablet, Take 1 tablet (10 mg) by mouth 2 times a day., Disp: 180 tablet, Rfl: 3    polyethylene glycol-electrolytes 420 gram solution, Take by mouth., Disp: , Rfl:     simvastatin (Zocor) 20  mg tablet, TAKE 1 TABLET ONCE DAILY, Disp: 90 tablet, Rfl: 1    sodium hyaluronate (Euflexxa) 10 mg/mL(mw 2.4 -3.6 million) injection, Physician to inject 2ml into the left knee once a week for 3 weeks.  For office use only., Disp: 6 mL, Rfl: 0    triamcinolone (Kenalog) 0.1 % cream, 0, Disp: , Rfl:     vibegron 75 mg tablet, Take 1 tablet (75 mg) by mouth once daily., Disp: 30 tablet, Rfl: 11    fluticasone (Flonase) 50 mcg/actuation nasal spray, Administer 1 spray into each nostril once daily. Shake gently. Before first use, prime pump. After use, clean tip and replace cap., Disp: 16 g, Rfl: 0

## 2025-06-04 ENCOUNTER — APPOINTMENT (OUTPATIENT)
Dept: DERMATOLOGY | Facility: CLINIC | Age: 75
End: 2025-06-04
Payer: MEDICARE

## 2025-06-04 DIAGNOSIS — D22.9 MELANOCYTIC NEVUS, UNSPECIFIED LOCATION: ICD-10-CM

## 2025-06-04 DIAGNOSIS — D48.5 NEOPLASM OF UNCERTAIN BEHAVIOR OF SKIN: Primary | ICD-10-CM

## 2025-06-04 DIAGNOSIS — L57.8 DIFFUSE PHOTODAMAGE OF SKIN: ICD-10-CM

## 2025-06-04 DIAGNOSIS — L30.4 INTERTRIGO: ICD-10-CM

## 2025-06-04 DIAGNOSIS — L82.1 SEBORRHEIC KERATOSIS: ICD-10-CM

## 2025-06-04 DIAGNOSIS — D18.01 HEMANGIOMA OF SKIN: ICD-10-CM

## 2025-06-04 ASSESSMENT — DERMATOLOGY QUALITY OF LIFE (QOL) ASSESSMENT
WHAT SINGLE SKIN CONDITION LISTED BELOW IS THE PATIENT ANSWERING THE QUALITY-OF-LIFE ASSESSMENT QUESTIONS ABOUT: NONE OF THE ABOVE
DATE THE QUALITY-OF-LIFE ASSESSMENT WAS COMPLETED: 67360
RATE HOW EMOTIONALLY BOTHERED YOU ARE BY YOUR SKIN PROBLEM (FOR EXAMPLE, WORRY, EMBARRASSMENT, FRUSTRATION): 0 - NEVER BOTHERED
RATE HOW BOTHERED YOU ARE BY EFFECTS OF YOUR SKIN PROBLEMS ON YOUR ACTIVITIES (EG, GOING OUT, ACCOMPLISHING WHAT YOU WANT, WORK ACTIVITIES OR YOUR RELATIONSHIPS WITH OTHERS): 0 - NEVER BOTHERED
WHAT SINGLE SKIN CONDITION LISTED BELOW IS THE PATIENT ANSWERING THE QUALITY-OF-LIFE ASSESSMENT QUESTIONS ABOUT: NONE OF THE ABOVE
ARE THERE EXCLUSIONS OR EXCEPTIONS FOR THE QUALITY OF LIFE ASSESSMENT: NO
RATE HOW BOTHERED YOU ARE BY SYMPTOMS OF YOUR SKIN PROBLEM (EG, ITCHING, STINGING BURNING, HURTING OR SKIN IRRITATION): 0 - NEVER BOTHERED
RATE HOW BOTHERED YOU ARE BY EFFECTS OF YOUR SKIN PROBLEMS ON YOUR ACTIVITIES (EG, GOING OUT, ACCOMPLISHING WHAT YOU WANT, WORK ACTIVITIES OR YOUR RELATIONSHIPS WITH OTHERS): 0 - NEVER BOTHERED
ARE THERE EXCLUSIONS OR EXCEPTIONS FOR THE QUALITY OF LIFE ASSESSMENT: NO
DATE THE QUALITY-OF-LIFE ASSESSMENT WAS COMPLETED: 67360
RATE HOW EMOTIONALLY BOTHERED YOU ARE BY YOUR SKIN PROBLEM (FOR EXAMPLE, WORRY, EMBARRASSMENT, FRUSTRATION): 0 - NEVER BOTHERED
RATE HOW BOTHERED YOU ARE BY SYMPTOMS OF YOUR SKIN PROBLEM (EG, ITCHING, STINGING BURNING, HURTING OR SKIN IRRITATION): 0 - NEVER BOTHERED

## 2025-06-04 ASSESSMENT — ITCH NUMERIC RATING SCALE: HOW SEVERE IS YOUR ITCHING?: 0

## 2025-06-04 ASSESSMENT — DERMATOLOGY PATIENT ASSESSMENT
DO YOU HAVE IRREGULAR MENSTRUAL CYCLES: NO
ARE YOU ON BIRTH CONTROL: NO
DO YOU USE SUNSCREEN: OCCASIONALLY
ARE YOU TRYING TO GET PREGNANT: NO
HAVE YOU HAD OR DO YOU HAVE VASCULAR DISEASE: NO
DO YOU HAVE ANY NEW OR CHANGING LESIONS: NO
HAVE YOU HAD OR DO YOU HAVE A STAPH INFECTION: NO
ARE YOU AN ORGAN TRANSPLANT RECIPIENT: NO
DO YOU USE A TANNING BED: NO

## 2025-06-04 ASSESSMENT — PATIENT GLOBAL ASSESSMENT (PGA): PATIENT GLOBAL ASSESSMENT: PATIENT GLOBAL ASSESSMENT:  1 - CLEAR

## 2025-06-04 NOTE — Clinical Note
Clinically benign- to slightly atypical-appearing nevi - the clinically benign- to slightly atypical-appearing nature of the remainder of the patient's nevi was discussed with the patient today.  None of the patient's nevi, with the exception of the 2 noted above, meet threshold for biopsy today.  We emphasized the importance of performing monthly self-skin exams using the ABCDs of monitoring moles, which were reviewed with the patient today and an informational hand-out provided. We also emphasized the importance of sun avoidance and sun protection with daily sunscreen use.

## 2025-06-04 NOTE — Clinical Note
Intertrigo - bilateral infra-mammary folds.  The potentially chronic and intermittently flaring nature of this condition, which likely involves colonization with Candidal yeast, and treatment options were discussed extensively with the patient today.  At this time, we recommend topical anti-fungal therapy with Zeasorb AF powder, which she was instructed to apply to the affected areas once or twice daily. The risks, benefits, and side effects of this medication were discussed.  The patient expressed understanding and is in agreement with this plan.

## 2025-06-04 NOTE — Clinical Note
5 mm dark brown pigmented, asymmetric macule with an asymmetric pigment network and irregular borders

## 2025-06-04 NOTE — Clinical Note
Singh Angiomas - the benign nature of these vascular lesions was discussed with the patient today and reassurance provided.  No treatment is medically indicated for these lesions at this time.

## 2025-06-04 NOTE — Clinical Note
7 mm dark brown pigmented, asymmetric pacule with an asymmetric pigment network and irregular borders

## 2025-06-04 NOTE — Clinical Note
In the patient's bilateral infra-mammary folds, there are red, moist, slightly scaly patches located in skin folds.

## 2025-06-06 LAB
LABORATORY COMMENT REPORT: NORMAL
PATH REPORT.FINAL DX SPEC: NORMAL
PATH REPORT.GROSS SPEC: NORMAL
PATH REPORT.MICROSCOPIC SPEC OTHER STN: NORMAL
PATH REPORT.RELEVANT HX SPEC: NORMAL
PATH REPORT.TOTAL CANCER: NORMAL

## 2025-06-16 ENCOUNTER — APPOINTMENT (OUTPATIENT)
Dept: ENDOCRINOLOGY | Facility: CLINIC | Age: 75
End: 2025-06-16
Payer: MEDICARE

## 2025-06-26 ENCOUNTER — APPOINTMENT (OUTPATIENT)
Dept: ENDOCRINOLOGY | Facility: CLINIC | Age: 75
End: 2025-06-26
Payer: MEDICARE

## 2025-06-26 VITALS
WEIGHT: 258.4 LBS | BODY MASS INDEX: 38.27 KG/M2 | TEMPERATURE: 97.5 F | HEIGHT: 69 IN | DIASTOLIC BLOOD PRESSURE: 79 MMHG | SYSTOLIC BLOOD PRESSURE: 136 MMHG | HEART RATE: 61 BPM

## 2025-06-26 DIAGNOSIS — E66.812 CLASS 2 OBESITY WITHOUT SERIOUS COMORBIDITY WITH BODY MASS INDEX (BMI) OF 39.0 TO 39.9 IN ADULT, UNSPECIFIED OBESITY TYPE: ICD-10-CM

## 2025-06-26 PROCEDURE — 3078F DIAST BP <80 MM HG: CPT | Performed by: NURSE PRACTITIONER

## 2025-06-26 PROCEDURE — 99214 OFFICE O/P EST MOD 30 MIN: CPT | Performed by: NURSE PRACTITIONER

## 2025-06-26 PROCEDURE — 3075F SYST BP GE 130 - 139MM HG: CPT | Performed by: NURSE PRACTITIONER

## 2025-06-26 ASSESSMENT — ENCOUNTER SYMPTOMS
DEPRESSION: 0
OCCASIONAL FEELINGS OF UNSTEADINESS: 1
LOSS OF SENSATION IN FEET: 0

## 2025-06-26 NOTE — PROGRESS NOTES
"Subjective  Ileana Glover is a 75 y.o. female with a hx of obesity  who presents for weight management and obesity medicine follow up.  Updates; She notes she has seen the improved habit wince being in the program making changes to her eating habit which has lead her to \"no longer have the urge to snack any longer\" no longer skip meals \"I am now eating 3 meals a day, high fiber and high protein and was not eating enough before\" she credits her nutrition consult with this change in behavior  Current Plan  1. Nutrition: Mediterranean Diet, Calorie Counting, and Healthy Plate, Tracking with my fitness pal     2. Sleep: Stable      3. Stress: Stable     4. Exercise: Incorporating consistently-        5. Appetite control: Stable  Obesity medication: none     6. Prior Goals: Met  Nutrition: Consult with dietitian. Please review the Healthy Plate information. Try to aim for 20-30 grams protein per meal. High protein sources (Greek yogurt, cottage cheese, eggs, overnight oats, protein bar or shake , turkey, tuna pack). Fairlife and Premier are options for protein drinks. Combine the protein with high fiber (berries, greens, carrots).  Portion plates  from [employer] and measuring cups are good options   Medications: Please see below options on phentermine, Qsymia, and Contrave that are non-injection options  Follow Up:  Have the dietitian initially and then begin the group visits, these can be virtual or in person     New Goals: Nutrition- Continue Mediterranean Diet, Calorie Counting, and Healthy Plate, Tracking with my fitness pal and ain for 64 ounces of water , Exercise- Continue Mondays and Wednesdays swimming and water aerobics, begin using pedal bike 15-30 minutes per day, Medication-  , and Follow-up- 1 month    Weight trend:    Wt Readings from Last 3 Encounters:   06/26/25 117 kg (258 lb 6.4 oz)   06/02/25 119 kg (262 lb)   04/28/25 122 kg (268 lb)       Recent weight:    Current Medications[1]    ROS:  System: " "normal  Eyes : no visual changes  Neck : no tenderness, no new lumps/bumps  Respiratory : no SOB  Cardiovascular : no chest pain, no palpitations  Gastro-Intestinal : no abdominal concerns  Neurological : no numbness or tingling in the extremities  Musculoskeletal : no joint paint, no muscle pain  Skin : no unusual rashes  Psychiatric : no depression, no anxiety  See HPI for Endocrine ROS    Medical History[2]    Surgical History[3]    Social History     Socioeconomic History    Marital status:      Spouse name: Not on file    Number of children: Not on file    Years of education: Not on file    Highest education level: Not on file   Occupational History    Not on file   Tobacco Use    Smoking status: Former     Types: Cigarettes    Smokeless tobacco: Never   Vaping Use    Vaping status: Never Used   Substance and Sexual Activity    Alcohol use: Yes     Comment: social    Drug use: Not Currently    Sexual activity: Never     Birth control/protection: None   Other Topics Concern    Not on file   Social History Narrative    Not on file     Social Drivers of Health     Financial Resource Strain: Not on file   Food Insecurity: Not on file   Transportation Needs: Not on file   Physical Activity: Not on file   Stress: Not on file   Social Connections: Not on file   Intimate Partner Violence: Not on file   Housing Stability: Not on file       Objective      Physical Exam:  Blood pressure 136/79, pulse 61, temperature 36.4 °C (97.5 °F), temperature source Oral, height 1.753 m (5' 9\"), weight 117 kg (258 lb 6.4 oz).  General : alert and oriented X3, no acute distress  Eyes : EOMI     Assessment/Plan   Ileana Glover is a 75 y.o. female with a hx of obesity who presents for follow up for weight management and obesity medicine visit.    A/P Follow up:    Loss of 10 pounds since 4/20/2025, weight loss goals being achieved  Problem List Items Addressed This Visit       Obesity, unspecified           New Goals: Nutrition- " Continue Mediterranean Diet, Calorie Counting, and Healthy Plate, Tracking with my fitness pal and ain for 64 ounces of water , Exercise- Continue Mondays and Wednesdays swimming and water aerobics, begin using pedal bike 15-30 minutes per day, Medication- , and Follow-up- 1 month  [facility] Topic: Healthy eating on a budget  Dietitian Present during [facility]: Obdulia Turner RD, CSOWM, LD, CDCES    Weight Management : Reviewed the principles of energy metabolism, caloric intake and expenditure, and rationale for a treatment program.  Also reinforced need for reduced calorie, low fat diet and increased physical activity.    Follow up in a group or individual visit as determined.    Marimar Acevedo, APRN-CNP         [1]   Current Outpatient Medications:     acetaminophen (Tylenol 8 HOUR) 650 mg ER tablet, Take 2 tablets (1,300 mg) by mouth every 8 hours if needed., Disp: , Rfl:     amoxicillin (Amoxil) 500 mg capsule, Take 1 capsule (500 mg) by mouth every 12 hours., Disp: , Rfl:     aspirin 81 mg EC tablet, Take 1 tablet (81 mg) by mouth once daily., Disp: , Rfl:     azithromycin (Zithromax) 250 mg tablet, Take 1 tablet (250 mg) by mouth once daily., Disp: , Rfl:     calcium carbonate-vitamin D3 500 mg-3.125 mcg (125 unit) tablet tablet, Take by mouth., Disp: , Rfl:     diclofenac sodium (Voltaren Arthritis Pain) 1 % gel, Apply 4.5 inches (4 g) topically 4 times a day as needed (midfoot pain)., Disp: 4 g, Rfl: 2    estradiol (Estrace) 0.01 % (0.1 mg/gram) vaginal cream, Please use a dime sized or pea-sized amount vaginally 3 times a week., Disp: 42.5 g, Rfl: 2    fluticasone (Flonase) 50 mcg/actuation nasal spray, Administer 1 spray into each nostril once daily. Shake gently. Before first use, prime pump. After use, clean tip and replace cap., Disp: 16 g, Rfl: 0    folic acid (Folvite) 1 mg tablet, Take 1 tablet (1 mg) by mouth once daily., Disp: 90 tablet, Rfl: 0    hydrocortisone 2.5 % cream, once daily., Disp: , Rfl:      hydroxyurea (Hydrea) 500 mg capsule, 2 capsules PO every day with food - Except on Wednesdays take 3 caps PO on Wednesdays, Disp: 192 capsule, Rfl: 2     mg tablet, TAKE ONE TABLET BY MOUTH EVERY 8 HOURS with food or milk AS NEEDED, Disp: , Rfl:     levothyroxine (Synthroid) 150 mcg tablet, TAKE 1 TABLET ONCE DAILY INTHE MORNING BEFORE A MEAL, Disp: 90 tablet, Rfl: 3    meloxicam (Mobic) 15 mg tablet, TAKE ONE TABLET BY MOUTH EVERY DAY, Disp: 30 tablet, Rfl: 0    meloxicam (Mobic) 15 mg tablet, Take 1 tablet (15 mg) by mouth once daily., Disp: 30 tablet, Rfl: 11    metoprolol succinate XL (Toprol-XL) 50 mg 24 hr tablet, TAKE 1 TABLET ONCE DAILY, DO NOT CRUSH OR CHEW (DISCARD PRESCRIPTION FOR METOPROLOL TARTRATE 25MG), Disp: 90 tablet, Rfl: 1    olmesartan (BENIcar) 20 mg tablet, TAKE 1 TABLET ONCE DAILY, Disp: 90 tablet, Rfl: 0    oxybutynin XL (Ditropan-XL) 10 mg 24 hr tablet, Take 1 tablet (10 mg) by mouth 2 times a day., Disp: 180 tablet, Rfl: 3    polyethylene glycol-electrolytes 420 gram solution, Take by mouth., Disp: , Rfl:     simvastatin (Zocor) 20 mg tablet, TAKE 1 TABLET ONCE DAILY, Disp: 90 tablet, Rfl: 1    sodium hyaluronate (Euflexxa) 10 mg/mL(mw 2.4 -3.6 million) injection, Physician to inject 2ml into the left knee once a week for 3 weeks.  For office use only., Disp: 6 mL, Rfl: 0    triamcinolone (Kenalog) 0.1 % cream, 0, Disp: , Rfl:     vibegron 75 mg tablet, Take 1 tablet (75 mg) by mouth once daily., Disp: 30 tablet, Rfl: 11  [2]   Past Medical History:  Diagnosis Date    Essential (primary) hypertension 10/26/2022    Hypertension    Hypothyroidism, unspecified 10/26/2022    Hypothyroidism    Personal history of other diseases of the respiratory system 10/21/2022    History of acute pharyngitis    Pure hypercholesterolemia, unspecified 08/12/2014    High cholesterol   [3]   Past Surgical History:  Procedure Laterality Date    KNEE ARTHROSCOPY W/ DEBRIDEMENT  12/10/2013    Arthroscopy  Knee Left    OOPHORECTOMY  08/12/2014    Oophorectomy - Unilateral (Removal Of One Ovary)    OTHER SURGICAL HISTORY  03/08/2022    Tonsillectomy    OTHER SURGICAL HISTORY  01/30/2017    Wrist Surgery

## 2025-06-30 NOTE — PATIENT INSTRUCTIONS
New Goals: Nutrition- Continue Mediterranean Diet, Calorie Counting, and Healthy Plate, Tracking with my fitness pal and ain for 64 ounces of water , Exercise- Continue Mondays and Wednesdays swimming and water aerobics, begin using pedal bike 15-30 minutes per day, Medication- , and Follow-up- 1 month

## 2025-07-02 ENCOUNTER — LAB (OUTPATIENT)
Dept: LAB | Facility: CLINIC | Age: 75
End: 2025-07-02
Payer: MEDICARE

## 2025-07-02 ENCOUNTER — OFFICE VISIT (OUTPATIENT)
Dept: HEMATOLOGY/ONCOLOGY | Facility: CLINIC | Age: 75
End: 2025-07-02
Payer: MEDICARE

## 2025-07-02 VITALS
BODY MASS INDEX: 37.9 KG/M2 | SYSTOLIC BLOOD PRESSURE: 133 MMHG | TEMPERATURE: 97.2 F | WEIGHT: 256.62 LBS | RESPIRATION RATE: 18 BRPM | DIASTOLIC BLOOD PRESSURE: 77 MMHG | OXYGEN SATURATION: 96 % | HEART RATE: 64 BPM

## 2025-07-02 DIAGNOSIS — D45 JAK2 POSITIVE POLYCYTHEMIA VERA (MULTI): ICD-10-CM

## 2025-07-02 DIAGNOSIS — D45 JAK2 POSITIVE POLYCYTHEMIA VERA (MULTI): Primary | ICD-10-CM

## 2025-07-02 LAB
ALBUMIN SERPL BCP-MCNC: 4.4 G/DL (ref 3.4–5)
ALP SERPL-CCNC: 59 U/L (ref 33–136)
ALT SERPL W P-5'-P-CCNC: 20 U/L (ref 7–45)
ANION GAP SERPL CALC-SCNC: 12 MMOL/L (ref 10–20)
AST SERPL W P-5'-P-CCNC: 19 U/L (ref 9–39)
BASOPHILS # BLD AUTO: 0.03 X10*3/UL (ref 0–0.1)
BASOPHILS NFR BLD AUTO: 0.5 %
BILIRUB SERPL-MCNC: 0.5 MG/DL (ref 0–1.2)
BUN SERPL-MCNC: 29 MG/DL (ref 6–23)
CALCIUM SERPL-MCNC: 10.1 MG/DL (ref 8.6–10.3)
CHLORIDE SERPL-SCNC: 102 MMOL/L (ref 98–107)
CO2 SERPL-SCNC: 29 MMOL/L (ref 21–32)
CREAT SERPL-MCNC: 1.08 MG/DL (ref 0.5–1.05)
EGFRCR SERPLBLD CKD-EPI 2021: 53 ML/MIN/1.73M*2
EOSINOPHIL # BLD AUTO: 0.07 X10*3/UL (ref 0–0.4)
EOSINOPHIL NFR BLD AUTO: 1.1 %
ERYTHROCYTE [DISTWIDTH] IN BLOOD BY AUTOMATED COUNT: 12.9 % (ref 11.5–14.5)
GLUCOSE SERPL-MCNC: 100 MG/DL (ref 74–99)
HCT VFR BLD AUTO: 49 % (ref 36–46)
HGB BLD-MCNC: 16.7 G/DL (ref 12–16)
IMM GRANULOCYTES # BLD AUTO: 0.03 X10*3/UL (ref 0–0.5)
IMM GRANULOCYTES NFR BLD AUTO: 0.5 % (ref 0–0.9)
LYMPHOCYTES # BLD AUTO: 1.84 X10*3/UL (ref 0.8–3)
LYMPHOCYTES NFR BLD AUTO: 28.2 %
MCH RBC QN AUTO: 36.2 PG (ref 26–34)
MCHC RBC AUTO-ENTMCNC: 34.1 G/DL (ref 32–36)
MCV RBC AUTO: 106 FL (ref 80–100)
MONOCYTES # BLD AUTO: 0.57 X10*3/UL (ref 0.05–0.8)
MONOCYTES NFR BLD AUTO: 8.7 %
NEUTROPHILS # BLD AUTO: 3.98 X10*3/UL (ref 1.6–5.5)
NEUTROPHILS NFR BLD AUTO: 61 %
NRBC BLD-RTO: 0 /100 WBCS (ref 0–0)
PLATELET # BLD AUTO: 304 X10*3/UL (ref 150–450)
POTASSIUM SERPL-SCNC: 4.5 MMOL/L (ref 3.5–5.3)
PROT SERPL-MCNC: 6.7 G/DL (ref 6.4–8.2)
RBC # BLD AUTO: 4.61 X10*6/UL (ref 4–5.2)
SODIUM SERPL-SCNC: 138 MMOL/L (ref 136–145)
WBC # BLD AUTO: 6.5 X10*3/UL (ref 4.4–11.3)

## 2025-07-02 PROCEDURE — 99214 OFFICE O/P EST MOD 30 MIN: CPT | Mod: 25 | Performed by: INTERNAL MEDICINE

## 2025-07-02 PROCEDURE — 1125F AMNT PAIN NOTED PAIN PRSNT: CPT | Performed by: INTERNAL MEDICINE

## 2025-07-02 PROCEDURE — 85025 COMPLETE CBC W/AUTO DIFF WBC: CPT

## 2025-07-02 PROCEDURE — 3078F DIAST BP <80 MM HG: CPT | Performed by: INTERNAL MEDICINE

## 2025-07-02 PROCEDURE — 84075 ASSAY ALKALINE PHOSPHATASE: CPT

## 2025-07-02 PROCEDURE — 99204 OFFICE O/P NEW MOD 45 MIN: CPT | Performed by: INTERNAL MEDICINE

## 2025-07-02 PROCEDURE — 3075F SYST BP GE 130 - 139MM HG: CPT | Performed by: INTERNAL MEDICINE

## 2025-07-02 PROCEDURE — 36415 COLL VENOUS BLD VENIPUNCTURE: CPT

## 2025-07-02 PROCEDURE — 1159F MED LIST DOCD IN RCRD: CPT | Performed by: INTERNAL MEDICINE

## 2025-07-02 ASSESSMENT — PAIN SCALES - GENERAL: PAINLEVEL_OUTOF10: 5

## 2025-07-02 ASSESSMENT — COLUMBIA-SUICIDE SEVERITY RATING SCALE - C-SSRS
6. HAVE YOU EVER DONE ANYTHING, STARTED TO DO ANYTHING, OR PREPARED TO DO ANYTHING TO END YOUR LIFE?: NO
1. IN THE PAST MONTH, HAVE YOU WISHED YOU WERE DEAD OR WISHED YOU COULD GO TO SLEEP AND NOT WAKE UP?: NO
2. HAVE YOU ACTUALLY HAD ANY THOUGHTS OF KILLING YOURSELF?: NO

## 2025-07-02 ASSESSMENT — PATIENT HEALTH QUESTIONNAIRE - PHQ9
1. LITTLE INTEREST OR PLEASURE IN DOING THINGS: NOT AT ALL
2. FEELING DOWN, DEPRESSED OR HOPELESS: NOT AT ALL
SUM OF ALL RESPONSES TO PHQ9 QUESTIONS 1 AND 2: 0

## 2025-07-02 NOTE — PROGRESS NOTES
Patient ID: Ileana Glover is a 75 y.o. female.  Referring Physician: Jayden Good MD  [id number] [place] [address]  Primary Care Provider: Ghassan Bush MD  Visit Type: Follow Up  Diagnosis: JAK2 +ve PV      Therapy summary (diagnosis: JAK2 +ve PV):   hydrea: 1000mg daily- (+500mg weekly, 6/5/2024)     Subjective  HPI  75 y.o. 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   Previously followed by Alessandra Tripp NP, previously followed with .       Re PV:   5/2020 BMBX revealed which showed GIRISH-2 positive mutation and was diagnosed with Polycythemia Vera in conjunction with erythrocytosis. Currently asymptomatic and no hx VTE.   Did not have phlebotomies as she does not like needles. Currently on hydrea 1000mg daily, dose last increased about a year back.   Has gained 20lbs in a couple months. Has not missed any doses. Does not note h/o ARIELLE/OHS.   No supplements. Has been adherent on her medications.   Age appropriate cancer screening: last colo 1yr back WNL, mammo 1/24 WNL. No p/h/o cancer.   FMH: father had a stroke at 76yrs   Social history: never smoker, occasionally drinks, no RDA.      06/05/24: presents alone. taking 1000mg daily. Asymptomatic. Not had phleb in recent past. Ongoing weight loss 170-->163lbs  09/04/24: Continues to report episodes of falling asleep while watching TV occasionally while drinking coffee.  Her PCP is suspicious for sleep apnea.  Notices some fatigue but her degree of falling asleep appears disproportionate to the MPN alone which can cause some fatigue.  Is continuing to take her Hydrea as prescribed.  12/04/24: Presents alone, asymptomatic other than significant hip pain limiting her ability to exercise and therefore lose weight in time for her hip replacement surgery.  Notices some fatigue but she suspects this is more related to her pain from her hip issues  03/12/25:  follow up, asymptomatic other than joint issues, and labs are WNL. Has not yet met with endocrinology re weight management. No complaints re chemo.   7/2/25: Follow-up, asymptomatic other than arthralgias.  Tolerating Hydrea 1000 mg p.o. daily without any problems.        Review of Systems - Oncology  10 point review of systems negative except as stated in HPI     Objective  Surgical History         Past Surgical History:   Procedure Laterality Date    KNEE ARTHROSCOPY W/ DEBRIDEMENT   12/10/2013     Arthroscopy Knee Left    OOPHORECTOMY   08/12/2014     Oophorectomy - Unilateral (Removal Of One Ovary)    OTHER SURGICAL HISTORY   03/08/2022     Tonsillectomy    OTHER SURGICAL HISTORY   01/30/2017     Wrist Surgery         Patient's Oncology History documentation       Oncology History     No history exists.         /80   Pulse 56   Temp 36.4 °C (97.5 °F)   Resp 18   Wt 120 kg (265 lb)   SpO2 93%   BMI 38.57 kg/m²   Physical Exam  Vitals reviewed.   Constitutional:       General: She is not in acute distress.     Appearance: She is not toxic-appearing.   HENT:      Head: Normocephalic and atraumatic.   Eyes:      Comments:      Neck:      Comments:      Cardiovascular:      Rate and Rhythm: Normal rate.   Pulmonary:      Effort: Pulmonary effort is normal.   Abdominal:      General: There is distension.   Neurological:      General: No focal deficit present.      Mental Status: She is oriented to person, place, and time.   Psychiatric:         Mood and Affect: Mood normal.         Labs:        Lab Results   Component Value Date     WBC 5.3 03/10/2025     NEUTROABS 2.79 03/10/2025     IGABSOL 0.02 03/10/2025     LYMPHSABS 1.92 03/10/2025     MONOSABS 0.42 03/10/2025     EOSABS 0.11 03/10/2025     BASOSABS 0.03 03/10/2025     RBC 4.10 03/10/2025      (H) 03/10/2025     MCHC 33.5 03/10/2025     HGB 14.5 03/10/2025     HCT 43.3 03/10/2025      03/10/2025            Lab Results   Component Value  Date     CREATININE 0.93 03/10/2025     BUN 19 03/10/2025     EGFR 64 03/10/2025      03/10/2025     K 4.9 03/10/2025      03/10/2025     CO2 29 03/10/2025            Lab Results   Component Value Date     ALT 18 03/10/2025     AST 15 03/10/2025     ALKPHOS 72 03/10/2025     BILITOT 0.5 03/10/2025         BMBx 5/2020: NORMOCELLULAR BONE MARROW (20-30%) WITH TRILINEAGE HEMATOPOIESIS, MILDLY INCREASED RETICULIN FIBROSIS AND JAK2 EXON 12 MUTATION, SEE NOTE.  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 Age-adjusted marrow hypercellularity with panmyelosis is one of the three major criteria used to  establish a diagnosis of polycythemia vera. However, WHO criteria allow for the diagnosis in the absence of bone marrow biopsy (thereby omitting the criterion of demonstrating age-adjusted hypercellularity). The patient has a reported history of sustained erythrocytosis and myeloid NGS identified JAK2 exon 12 mutation with VAF of 12%, thereby fulfilling the other two major criteria. In large population studies JAK2 V617F but not the exon 12 mutation have been identified in clonal hematopoiesis of indeterminate potential (CHIP; Yelitza N Engl J Med 2014; 371:2488-249 ). Per EHR, erythropoietin levels are at the very low end of normal, consistent with EPO-independent erythroid proliferation. The overall combination of findings in this patient are most consistent with a diagnosis of polycythemia vera.    - Flow cytometric studies identified a small CD8+ T-cell population (around 3% of events) similar to that identified previously in blood. 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 No significant marrow infiltration was identified by immunostains. The findings are NOT diagnostic for T-LGL.  -Chromosome analysis  -FISH: none  -Molecular: Myeloid NGS panel  The results will be reported  separately.    Differential:           (Normal)    %    Promyelocytes         (1-5)       2.5  Myelocytes &   Metamyelocytes    (15-35)     20  Bands & Segs        (15-50)     34.5  Eosinophils               (2-4)      3  Basophils                  (0-1)      0  Lymphocytes           (5-25)     12  Monocytes                (0-2)      0  Plasma Cells             (0-2)     2  Blasts                        (0-1)     0.5  Total Erythroid        (17-35)    25.5  Number of cells counted: 200    Cellularity: Cellular              M:E Ratio: 2.5:1                                                            Electronically Signed Out By RADHA MARIN MD, PhD/CBR  By the signature on this report, the individual or group listed as making the  Final Interpretation/Diagnosis certifies that they have reviewed this case.          Addendum Diagnosis  TEST: Myeloid NGS Panel  DISEASE ASSOCIATED GENOMIC FINDINGS: JAK2 Exon 12 mutation [id number] ([id number]: [id number]) VAF: 12%  Note: The presence of a JAK2 exon 12 mutation fulfills a major WHO diagnostic criterion for establishing diagnosis of polycythemia vera (NCCN 2019).    Microscopic Description:  CBC: WBC 7.4 x 10E9/L, RBC 5.3 x 10E12/L, Hgb 16 g/dl, Hct 48.1%, MCV 91 fL, RDW 14.7%, platelets 435 x 10E9/L.  A 100 cell manual differential count reveals: polys 66%, lymphocytes 26%, monocytes 5%, eosinophils 3%.    PERIPHERAL SMEAR: Submitted             Red cells: Normal.       White cells: Occasional large granular lymphocytes and rare reactive-appearing lymphocytes.       Platelets: Normal, adequate.    ASPIRATE SMEAR: Submitted                         Specimen: Spicular.       Erythropoiesis: Normal.       Granulopoiesis: Normal.       Megakaryocytes: Present. Morphology: A subset of large and/or hyperlobulated megakaryocytes are present.    TOUCH PREP: Submitted       Specimen: Paucicellular.    ASPIRATE CLOT: Submitted                                Specimen: Aspicular.       Comments: There is a small focus degenerating non-hematopoietic cells present (<20 cells), most likely contamination.    CORE BIOPSY: Submitted                           Specimen: Adequate. Aspiration artifact present.       Cellularity: 20-30%.       Estimated M:E ratio: Consistent with aspirate smear.       Bony trabeculae: Normal.       Megakaryocytes: Adequate to slightly increased. Morphology: Occasional larger forms present. No significant clustering identified.       Granulomas: Absent.       Lymphoid aggregates: Absent.    SPECIAL STAINS:         Iron: Not able to be evaluated on aspicular clot.       Reticulin: Highlights mild increase in reticulin fobrosis (WHO grade MF-1)    IMMUNOHISTOCHEMISTRY: Performed.       CD20: Highlights scattered B-cells and a subset of cells within several  small aggregates       CD3: Highlights moderately numerous T-cells, including a subset of cells within lymphoid aggregates     CD4: Highlights a subset of T-cells; also weaky positive in moderately numerous monocytic cells       CD8: Highlights a subset of T-cells, in slight excess to CD4+ T-cells       CD56: Rare scattered positive cells       CD34: Highlights 1-2% blasts       Assessment/Plan  75 y.o. 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   Previously followed by Alessadnra Tripp NP, previously followed with .        # JAK2 exon 12 mutated polycythemia vera: confirmed on BMBx. Has been on hydrea, tolerated well generally. Her dose was recently uptitrated and her disease is now better controlled.  She is generally asymptomatic other than fatigue.  Plan:  -Hydrea 1000 mg daily except on Wednesdays when she takes 1500 mg.  -folic acid, ASA 81mg   - if she has joint replacement will need post-op extended prophylaxis with anticoagulation, a DOAC would be reasonable.   - reiterated need for weight  management given this is an additional risk for thrombosis along with MPN and PV.   - follow up next: 4 months   - labs prior to follow up: CBC/diff, CMP every 4 months

## 2025-07-02 NOTE — ADDENDUM NOTE
Addended by: SUDEEP HDZ on: 7/2/2025 10:29 AM     Modules accepted: Orders     Detail Level: Zone Detail Level: Generalized

## 2025-07-03 ENCOUNTER — EDUCATION (OUTPATIENT)
Dept: ORTHOPEDIC SURGERY | Facility: CLINIC | Age: 75
End: 2025-07-03
Payer: MEDICARE

## 2025-07-03 NOTE — GROUP NOTE
In addition to the group class activities, Ileana Glover had the following lab work completed:  No orders of the defined types were placed in this encounter.    Ileana Glover  attended joint class on 7/3 and Did not bring a care partner to the class. The preop survey was completed and the patient provided attestation that they understand the content reviewed and that they do have a care partner identified to assist with recovery. Patient was provided with a folder of materials and instructed to call orthopedic navigator with questions.   A new History and Physical was not completed.    This class lasted approximately 2 hours and had 10 participants.   Thank you for attending our Joint Replacement class today in preparation for your upcoming surgery.  Topics discussed include:    MyChart Enrollment  Communication with Care Team  My Chart is the best form of communication to reach all of your caregivers  You can send messages to specific care givers, or a care team  Continued Education  You will be enrolled in a Total Joint Replacement care plan to receive additional education before and after surgery  You can review a short recording of the class content  Access to Medical Records  You can access test results, office notes, appointments, etc.  You can connect to other healthcare systems who use [employer] ([facility], [facility], [facility], etc.)  [email or web address]  Program Information  Using Meds to Beds is our standard process for joint replacements at [facility] to minimize issues with homegoing medications. Please let us know ahead of time if there is a reason why Meds to Beds cannot be used    Background/Understanding of Joint Replacement Surgery  Potential for same day discharge  Any questions or concerns about your specific surgery/plan are to be directed to the surgeon's office    How to Prepare for Surgery  Use of Nicotine Products/Smoking  Stop several weeks before surgery  Such products slow down the healing process and  increase risk of post-op infection and complications  Clearance for Surgery  Medical Clearance by Specialists  Dental Clearance  Cracked/Broken/Loose teeth left untreated may postpone surgery  The importance of post-op antibiotics for dental visits per surgeon protocol  Preadmission Testing  **Potential for postponed surgery if appropriate clearance is not obtained  Medication Instruction  Follow instructions provided by the doctor who prescribes your medication (typically, but not limited to cardiologist)  Preadmission testing will provide additional instructions during your appointment on what to stop and what to take as you get closer to surgery  For clarification of these instructions, please call preadmission testing directly - [phone]  Tips for Preparing the home for discharge from the hospital  Care Partner  Requirement for surgery, the patient must have a plan to have help at home  Potential for postponed surgery if plan for home support cannot be established  How the care partner can help after surgery  CHG Body Wash/Mouth Wash  Follow the instructions given at preadmission testing  Body wash is to be used on the body and hair for 5 washes  Mouthwash is to be used the night before and morning of surgery  **This is a system-wide protocol developed by infectious disease professionals, we will not alter our recommendations for those with sensitive skin or those who have special hair needs.  Please follow the instructions as they are written as this will provide the best infection prevention measures for surgery.  Should you have an allergy to one of the products, please discuss with your preadmission team**    What to Expect in the Hospital/At Home  Morning of Surgery NPO Guidelines  Nothing to eat after midnight  Water can be consumed up to 2 hours prior to arrival  Surgical and Post-Surgical Care Team  Surgical Team  Anesthesia Team  Nursing  Physical Therapy  Care [facility]  Arrival Instructions  Arrive at the time provided to you  Consider traffic patterns (rush-hour) based on arrival time  Have arrangements made for a ride home  If discharging same day, care partner should remain at the hospital  Recovering after Surgery  Recovery Room - Visitors are not brought back  Transition to hospital room - 2nd Floor, Visitors will be directed to your room  The presence of and strategies for controlling surgical pain and swelling  The importance of early mobility  Side effects after surgery  What to expect if staying overnight    Discharge Planning  The intended plan for discharge will be for patients to discharge home  All patients require a care partner (family, friend, neighbor, etc.) to stay with the patient for the first few nights after surgery  The inability to secure help at home will postpone surgery  Home Care Services set up per surgeon order  Physical Therapy  Occupational Therapy  **If desired, private duty care can be arranged by the patient ahead of time**  Outpatient Physical Therapy per surgeon order    Recovering at Home  Wound Care  Follow wound care instructions found in your discharge paperwork  Bandage is water-resistant and you may shower with the bandage  Do not scrub directly over the bandage  Do not submerge in water until cleared (bathtub, hot tub, pool, etc.)    Post-Op Risk Prevention  Infection Prevention  Promptly seek treatment for any infections post-operatively  Routine dental visits must be postponed for 3 months after surgery  Your surgeon may require antibiotics prior to future dental visits  Any concerns for infection not related directly to the knee or the hip should be managed by your primary care provider  Blood Clots  Be sure to complete the course of blood thinning medication as prescribed by your surgeon  Movement every 1-2 hours during the day is encouraged to prevent blood clots  Monitor for signs of blood clots  Wear compression stockings as  prescribed by your surgeon  Constipation  Constipation is common following surgery  Drink plenty of fluids  Take stool softener/laxative as prescribed by your surgeon  Move around frequently  Eat foods high in fiber  Fall Prevention  Prepare home ahead of time to clear space to move with walker  Remove throw rugs and electrical cords from walkways  Install railings near any stairways with more than 2 steps  Use night lights for increased visibility at night  Continue to use your assistive device until cleared by surgeon or physical therapy  Dislocation Prevention - Not all procedures will have dislocation precautions  Follow dislocation precautions provided by your surgeon  It is OK to resume sexual activity about 6 weeks following surgery  Be sure to follow any dislocation precautions assigned    Durable Medical Equipment  Cold Therapy  Breg Cold Therapy Machines  Ice/Gel Packs  Assistive Devices  Folding Walker with Wheels (in the front only)  No Rollators  Crutches if approved by Physical Therapy and Surgeon after surgery  Hip Kits  Raised Toilet Seats  Additional Compression Stockings    Joint Preservation  Healthy Activities when Cleared  Walking  Swimming  Bike Riding  Activities to Avoid  Refrain from repetitive motions which have a high impact on the joint  Gradual Progression  Progress activity slowly, listen to your body  Common Findings - NORMAL after surgery  Clicking/Grinding  Numbness near incision    Physical Therapy  Prehabilitation exercises  START TODAY ON BOTH LEGS  Surgery Specific Precautions  Follow surgery specific precautions found in your discharge paperwork    Follow-Up Visit  All patients will see their surgeon for a follow up visit after surgery  The visit may range from 2-6 weeks after surgery and is surgeon specific      Please don't hesitate to reach out if you have any additional questions or concerns.    Ghassan Oseguera BSN, RN  Justyna Turner BSN, RN-BC  Orthopedic Nurses  [facility]   [phone]

## 2025-07-08 ENCOUNTER — PATIENT MESSAGE (OUTPATIENT)
Dept: HEMATOLOGY/ONCOLOGY | Facility: CLINIC | Age: 75
End: 2025-07-08
Payer: MEDICARE

## 2025-07-16 ENCOUNTER — APPOINTMENT (OUTPATIENT)
Dept: HEMATOLOGY/ONCOLOGY | Facility: CLINIC | Age: 75
End: 2025-07-16
Payer: MEDICARE

## 2025-07-17 ENCOUNTER — APPOINTMENT (OUTPATIENT)
Dept: PRIMARY CARE | Facility: CLINIC | Age: 75
End: 2025-07-17
Payer: MEDICARE

## 2025-07-17 VITALS — SYSTOLIC BLOOD PRESSURE: 136 MMHG | DIASTOLIC BLOOD PRESSURE: 78 MMHG

## 2025-07-17 DIAGNOSIS — E03.9 HYPOTHYROIDISM, UNSPECIFIED TYPE: ICD-10-CM

## 2025-07-17 DIAGNOSIS — E78.2 MIXED HYPERLIPIDEMIA: ICD-10-CM

## 2025-07-17 DIAGNOSIS — R73.09 ABNORMAL GLUCOSE: ICD-10-CM

## 2025-07-17 DIAGNOSIS — G43.809 OTHER MIGRAINE WITHOUT STATUS MIGRAINOSUS, NOT INTRACTABLE: Primary | ICD-10-CM

## 2025-07-17 DIAGNOSIS — I10 PRIMARY HYPERTENSION: ICD-10-CM

## 2025-07-17 PROBLEM — G83.10 PARESIS OF SINGLE LOWER EXTREMITY: Status: RESOLVED | Noted: 2024-02-05 | Resolved: 2025-07-17

## 2025-07-17 PROCEDURE — 3078F DIAST BP <80 MM HG: CPT | Performed by: STUDENT IN AN ORGANIZED HEALTH CARE EDUCATION/TRAINING PROGRAM

## 2025-07-17 PROCEDURE — G2211 COMPLEX E/M VISIT ADD ON: HCPCS | Performed by: STUDENT IN AN ORGANIZED HEALTH CARE EDUCATION/TRAINING PROGRAM

## 2025-07-17 PROCEDURE — 99214 OFFICE O/P EST MOD 30 MIN: CPT | Performed by: STUDENT IN AN ORGANIZED HEALTH CARE EDUCATION/TRAINING PROGRAM

## 2025-07-17 PROCEDURE — 3075F SYST BP GE 130 - 139MM HG: CPT | Performed by: STUDENT IN AN ORGANIZED HEALTH CARE EDUCATION/TRAINING PROGRAM

## 2025-07-17 RX ORDER — SUMATRIPTAN SUCCINATE 50 MG/1
50 TABLET ORAL ONCE AS NEEDED
Qty: 27 TABLET | Refills: 1 | Status: SHIPPED | OUTPATIENT
Start: 2025-07-17 | End: 2026-07-17

## 2025-07-17 RX ORDER — SIMVASTATIN 20 MG/1
20 TABLET, FILM COATED ORAL DAILY
Qty: 90 TABLET | Refills: 1 | Status: SHIPPED | OUTPATIENT
Start: 2025-07-17 | End: 2025-07-17 | Stop reason: SDUPTHER

## 2025-07-17 NOTE — PROGRESS NOTES
Subjective   Patient ID: Ileana Glover is a 75 y.o. female who presents for Follow-up.    HPI   Routine fu.    She had a HA that lasted for several days about a week ago. HA has resolved.    She has NOEMI scheduled for 8/4/25.  Review of Systems  12-point ROS reviewed and was negative unless otherwise noted in HPI.    Objective   /78     Physical Exam  GEN: conversant, NAD  HEENT: PERRL, EOMI, MMM  NECK: supple, no carotid bruits appreciated b/l  CV: S1, S2, RRR  PULM: CTAB  ABD: soft, NT, ND  NEURO: no new gross focal deficits  EXT: no sig LE edema  PSYCH: appropriate affect    Assessment/Plan     #?Migraine: start trial of sumatriptan PRN, discussed SE profile.    #Obesity, severe, class 2: Continue lifestyle modifications, swimming regularly, weight watchers. Referral to sleep medicine for ARIELLE eval.     #Hypertension: continue olmesartan 20mg daily.      #Tinnitus: right TM clear. Offered referral to ENT on previous visit. Now resolved.     #Hypothyroidism: Maintained on levothyroxine 150mcg daily     #Polycythemia vera/thrombocytosis: Seeing hematology. Continue hydroxyurea, ASA 81 mg daily.     #Hyperlipidemia: Continue statin.      #Hip/Knee/shoulder OA: following with Juan Nelson and [facility]. Had shoulder surgery with Dr. Sotelo in 7/2022. Had right NOEMI 9/2023. Is looking to get L-hip replaced but ortho rec continued weight loss     #Chronic pain: established with pain management - hx of neck injections with significant improvement in symptoms     #Skin lesions: seeing dermatology      #Health maintenance:   Follows with gynecology, who orders mammograms regularly. DEXA performed 2022 Tetanus vaccine 2013.  Pneumovax 2015. Prevnar 20 recommended. Colonoscopy 5/2023, 3 year fu advised. Received Shingrix and COVID-19 vaccine. Advised yearly flu shot. Longitudinal care.     RTC in 4 mo

## 2025-07-18 ENCOUNTER — TELEPHONE (OUTPATIENT)
Dept: HEMATOLOGY/ONCOLOGY | Facility: CLINIC | Age: 75
End: 2025-07-18
Payer: MEDICARE

## 2025-07-18 DIAGNOSIS — D45 POLYCYTHEMIA VERA: ICD-10-CM

## 2025-07-18 LAB
CHOLEST SERPL-MCNC: 174 MG/DL
CHOLEST/HDLC SERPL: 2.9 (CALC)
EST. AVERAGE GLUCOSE BLD GHB EST-MCNC: 111 MG/DL
EST. AVERAGE GLUCOSE BLD GHB EST-SCNC: 6.2 MMOL/L
HBA1C MFR BLD: 5.5 %
HDLC SERPL-MCNC: 59 MG/DL
LDLC SERPL CALC-MCNC: 98 MG/DL (CALC)
NONHDLC SERPL-MCNC: 115 MG/DL (CALC)
TRIGL SERPL-MCNC: 81 MG/DL
TSH SERPL-ACNC: 3.11 MIU/L (ref 0.4–4.5)

## 2025-07-18 RX ORDER — SIMVASTATIN 20 MG/1
20 TABLET, FILM COATED ORAL DAILY
Qty: 90 TABLET | Refills: 1 | Status: SHIPPED | OUTPATIENT
Start: 2025-07-18

## 2025-07-18 NOTE — TELEPHONE ENCOUNTER
Reason for Conversation  mediation refill    Background   Patient called needs a refill of her Hydroxyurea 500mg. Goes to [facility] [employer] mail order we have on file.     Disposition   Message sent to Dr. Estrada that pt needs a refill of her medication. Pt aware .

## 2025-07-21 ENCOUNTER — APPOINTMENT (OUTPATIENT)
Dept: ENDOCRINOLOGY | Facility: CLINIC | Age: 75
End: 2025-07-21
Payer: MEDICARE

## 2025-07-21 VITALS — BODY MASS INDEX: 37.58 KG/M2 | HEIGHT: 69 IN | WEIGHT: 253.7 LBS

## 2025-07-21 DIAGNOSIS — D45 POLYCYTHEMIA VERA: ICD-10-CM

## 2025-07-21 DIAGNOSIS — E66.9 OBESITY WITH BODY MASS INDEX 30 OR GREATER: Primary | ICD-10-CM

## 2025-07-21 DIAGNOSIS — Z71.3 DIETARY COUNSELING: ICD-10-CM

## 2025-07-21 RX ORDER — HYDROXYUREA 500 MG/1
CAPSULE ORAL
Qty: 192 CAPSULE | Refills: 3 | Status: SHIPPED | OUTPATIENT
Start: 2025-07-21

## 2025-07-21 NOTE — PATIENT INSTRUCTIONS
- Please refer to your book entitled: Your Mediterranean Meal Plan, and follow Mediterranean Diet eating guidelines as reviewed.  - The Healthy Plate style of eating can be a helpful tool for incorporating healthy balanced meals in appropriate portions. (Healthy Plate: Start with a 9-inch diameter plate. Fill 1/2 the plate with non-starchy vegetables, 1/4 of the plate with whole grains or starchy vegetables, and 1/4 of the plate with a lean source of protein.   - Please aim for a source of healthy protein and fiber rich foods at meals as discussed for nutrition needs as well as to help provide better satiety at meals.   - Aim for 64 ounces of water daily.  - Continue with exercise per PT recommendations.   - Follow-up with nutrition in 6 weeks.

## 2025-07-21 NOTE — Clinical Note
Can you please place this pt on my schedule on 9/2/2025 at 10:30 am for a virtual visit please.  Thank you

## 2025-07-21 NOTE — PROGRESS NOTES
"Follow-up Nutrition Assessment    Interval History: Patient presents for follow-up nutrition appointment for weight management/desire to lose weight. Since last nutrition visit on 6/2/2025, pt with a 8.5 lbs weight loss.     Diet recall reveals Diet recall reveals a consistent meal pattern with rare skipped meals, as well as recent implementation of well balanced meals with attention to consistent lean protein, fruits/vegetables/complex CHO, and healthy fat food sources in appropriate portions to assist in achieving goals. Fluids meeting recommendations in type and amount with water as primary beverage. Pt is working on incorporating consistent physical activity at this time and would likely benefit from increased structured activity to assist in achieving goals.    Nutrition Interventions/Recommendations from last visit scheduled on 6/2/2025:  - Please refer to your book entitled: Your Mediterranean Meal Plan, and follow Mediterranean Diet eating guidelines as reviewed.  - The Healthy Plate style of eating can be a helpful tool for incorporating healthy balanced meals in appropriate portions. (Healthy Plate: Start with a 9-inch diameter plate. Fill 1/2 the plate with non-starchy vegetables, 1/4 of the plate with whole grains or starchy vegetables, and 1/4 of the plate with a lean source of protein.   - Please aim for a source of healthy protein and fiber rich foods at meals as discussed for nutrition needs as well as to help provide better satiety at meals.   - Aim for 64 ounces of water daily.  - Continue with exercise per PT recommendations.   - Follow-up with nutrition in 6 weeks.      Adherence to recommendations and patient stated goals: Partially met goals    Anthropometrics:  Height:   Ht Readings from Last 1 Encounters:   06/26/25 1.753 m (5' 9\")      Weight:   Wt Readings from Last 10 Encounters:   07/02/25 116 kg (256 lb 9.9 oz)   06/26/25 117 kg (258 lb 6.4 oz)   06/02/25 119 kg (262 lb)   04/28/25 122 " "kg (268 lb)   04/17/25 122 kg (270 lb)   04/16/25 123 kg (270 lb 3.2 oz)   03/20/25 120 kg (265 lb)   03/12/25 120 kg (265 lb)   01/31/25 121 kg (266 lb 8 oz)   12/12/24 122 kg (268 lb)      Current BMI:   BMI Readings from Last 1 Encounters:   07/02/25 37.90 kg/m²        Malnutrition Screening:  Significant Unintentional weight loss: No  Eating less than 75% of usual intake for more than 2 weeks: No  Potential Signs of Inflammation: No     Recommended Malnutrition Diagnosis: No malnutrition identified     Diet Recall-  Breakfast- Linda killer bread, 1/2 avocado and tomato and protein shake OR veggies made great frittata  Lunch- Tuna salad with cheese, Linda killer bread OR yogurt bowl   Dinner- chicken salad kits or protein with baked potato or pasta and some type of veg. Chili  Daily Snacks- Orgain protein shakes   Beverages- 2 -8 oz cups of coffee, 8 cups of water with crystal light, diet coke on occasion.     Alcohol- social     Physical Activity- 20 minutes of walking plus swimming exercises daily        Other pertinent patient reported Information:  -Tracking calories via MyFitnessPal- 1440 kcals and 72 gm protein doing well. Reports she overdid it on the fruit for a few weeks which caused her wt loss to stop. Once she stopped eating the extra fruit wt started to trend down again. Using 9\" salad plates.   - Pt reports that she is planning to have hip replacement August 4th, 2025  -Continues to batch cook proteins        Nutrition Diagnosis: Overweight/Obesity related to food-and-nutrition-related knowledge deficit as evidenced by BMI above normative standard for age and gender.     Readiness to Learn:  Cognitive ability: Alert and oriented  Motivation to learn: Eager and Interested  Family Support: Unable to assess- family not present  Instruction provided to: Patient  Patient learns best by: Multiple methods  Factors affecting learning: None   Physical limitations affecting learning: None     Education " Materials Provided:   None     Nutrition Interventions/Recommendations for 6/2/2025:  - Please refer to your book entitled: Your Mediterranean Meal Plan, and follow Mediterranean Diet eating guidelines as reviewed.  - The Healthy Plate style of eating can be a helpful tool for incorporating healthy balanced meals in appropriate portions. (Healthy Plate: Start with a 9-inch diameter plate. Fill 1/2 the plate with non-starchy vegetables, 1/4 of the plate with whole grains or starchy vegetables, and 1/4 of the plate with a lean source of protein.   - Please aim for a source of healthy protein and fiber rich foods at meals as discussed for nutrition needs as well as to help provide better satiety at meals.   - Aim for 64 ounces of water daily.  - Continue with exercise per PT recommendations.   - Follow-up with nutrition in 6 weeks.       Nutrition Monitoring & Evaluation: adherence to recommendations and patient stated goals     Need for follow-up: As scheduled for [facility] Shared Medical Appointment ([facility]). Follow-up with nutrition in 6 weeks.       Referred by: MARIELY Solo      MNT Billing Type: Medical Nutrition Re-Assessment, each 15 min increment, for 2 increments.  SIGNATURE:   Iwona Bland MS, RD, LD                                                      DATE:   7/21/2025

## 2025-07-22 RX ORDER — HYDROXYUREA 500 MG/1
CAPSULE ORAL
Qty: 192 CAPSULE | Refills: 3 | Status: SHIPPED | OUTPATIENT
Start: 2025-07-22

## 2025-07-23 ENCOUNTER — CLINICAL SUPPORT (OUTPATIENT)
Dept: PREADMISSION TESTING | Facility: HOSPITAL | Age: 75
End: 2025-07-23
Payer: MEDICARE

## 2025-07-23 RX ORDER — SENNOSIDES 8.6 MG/1
1 TABLET ORAL AS NEEDED
COMMUNITY

## 2025-07-23 NOTE — CPM/PAT NURSE NOTE
CPM/PAT Nurse Note      Name: Ileana Glover (Ileana Glover)  /Age: 1950/75 y.o.       Medical History[1]    Surgical History[2]    Patient  reports that she is not currently sexually active and has had partner(s) who are male. She reports using the following methods of birth control/protection: None and Other.    Family History[3]    Allergies[4]    Prior to Admission medications   Medication Sig Start Date End Date Taking? Authorizing Provider   acetaminophen (Tylenol 8 HOUR) 650 mg ER tablet Take 2 tablets (1,300 mg) by mouth every 8 hours if needed. 9/8/15   Historical Provider, MD   amoxicillin (Amoxil) 500 mg capsule Take 4 capsules (2,000 mg) by mouth 1 time. Pre dental work    Historical Provider, MD   aspirin 81 mg EC tablet Take 1 tablet (81 mg) by mouth once daily at bedtime. 19   Historical Provider, MD   calcium carbonate-vitamin D3 500 mg-3.125 mcg (125 unit) tablet tablet Take by mouth once daily. 09   Historical Provider, MD   estradiol (Estrace) 0.01 % (0.1 mg/gram) vaginal cream Please use a dime sized or pea-sized amount vaginally 3 times a week. 24   Shawn Salazar MD   fluticasone (Flonase) 50 mcg/actuation nasal spray Administer 1 spray into each nostril once daily. Shake gently. Before first use, prime pump. After use, clean tip and replace cap.  Patient not taking: Reported on 2025  Wyatt Genao DO   folic acid (Folvite) 1 mg tablet Take 1 tablet (1 mg) by mouth once daily. 25   WEI Dong-CNP   hydroxyurea (Hydrea) 500 mg capsule 2 capsules PO every day with food - Except on  take 3 caps PO on    Ajith Estrada MD   hydroxyurea (Hydrea) 500 mg capsule 2 capsules PO every day with food - Except on  take 3 caps PO on    Ajith Estrada MD    mg tablet TAKE ONE TABLET BY MOUTH EVERY 8 HOURS with food or milk AS NEEDED 23   Historical Provider, MD    levothyroxine (Synthroid) 150 mcg tablet TAKE 1 TABLET ONCE DAILY INTHE MORNING BEFORE A MEAL 5/22/25   Ghassan Bush MD   meloxicam (Mobic) 15 mg tablet Take 1 tablet (15 mg) by mouth once daily. 8/30/24 8/30/25  Maurizio Nelson MD   metoprolol succinate XL (Toprol-XL) 50 mg 24 hr tablet TAKE 1 TABLET ONCE DAILY, DO NOT CRUSH OR CHEW (DISCARD PRESCRIPTION FOR METOPROLOL TARTRATE 25MG) 4/16/25   Ghassan Bush MD   olmesartan (BENIcar) 20 mg tablet TAKE 1 TABLET ONCE DAILY 6/2/25   Ghassan Bush MD   oxybutynin XL (Ditropan-XL) 10 mg 24 hr tablet Take 1 tablet (10 mg) by mouth 2 times a day. 10/15/24   Shawn Salazar MD   psyllium (Metamucil) 3.4 gram packet Take 1 packet by mouth once daily.    Historical Provider, MD   sennosides (Senokot) 8.6 mg tablet Take 1 tablet (8.6 mg) by mouth if needed for constipation.    Historical Provider, MD   simvastatin (Zocor) 20 mg tablet Take 1 tablet (20 mg) by mouth once daily. 7/18/25   Ghassan Bush MD   SUMAtriptan (Imitrex) 50 mg tablet Take 1 tablet (50 mg) by mouth 1 time if needed for migraine. May repeat after 2 hours. 7/17/25 7/17/26  Ghassan Bush MD   vibegron 75 mg tablet Take 1 tablet (75 mg) by mouth once daily.  Patient not taking: Reported on 7/23/2025 7/16/24 7/16/25  Shawn Salazar MD   azithromycin (Zithromax) 250 mg tablet Take 1 tablet (250 mg) by mouth once daily.  7/23/25  Historical Provider, MD   diclofenac sodium (Voltaren Arthritis Pain) 1 % gel Apply 4.5 inches (4 g) topically 4 times a day as needed (midfoot pain). 5/22/24 7/23/25  Anup Barrientos MD   hydrocortisone 2.5 % cream once daily. 8/24/21 7/23/25  Historical Provider, MD   hydroxyurea (Hydrea) 500 mg capsule 2 capsules PO every day with food - Except on Wednesdays take 3 caps PO on Wednesdays 5/27/25 7/21/25  Jayden Good MD   meloxicam (Mobic) 15 mg tablet TAKE ONE TABLET BY MOUTH EVERY DAY 5/20/24 7/23/25  Maurizio Nelson MD   polyethylene  glycol-electrolytes 420 gram solution Take by mouth. 5/4/23 7/23/25  Historical Provider, MD   simvastatin (Zocor) 20 mg tablet TAKE 1 TABLET ONCE DAILY 2/6/25 7/17/25  Ghassan Bush MD   simvastatin (Zocor) 20 mg tablet Take 1 tablet (20 mg) by mouth once daily. 7/17/25 7/17/25  Ghassan Bush MD   sodium hyaluronate (Euflexxa) 10 mg/mL(mw 2.4 -3.6 million) injection Physician to inject 2ml into the left knee once a week for 3 weeks.  For office use only.  Patient not taking: Reported on 7/23/2025 5/2/25 7/23/25  Maurizio Nelson MD   triamcinolone (Kenalog) 0.1 % cream 0 7/5/23 7/23/25  Historical Provider, MD AZAM GRIFFITH     DASSHANNON Risk Score    No data to display  Caprini DVT Assessment    No data to display  Modified Frailty Index    No data to display  [id number] Stroke Risk Points  Current as of just now        N/A 0 to 9 Points:      Last Change: N/A          The [id number] risk score (Lip GH, et al. 2009. © 2010 American [employer]) quantifies the risk of stroke for a patient with atrial fibrillation. For patients without atrial fibrillation or under the age of 18 this score appears as N/A. Higher score values generally indicate higher risk of stroke.        This score is not applicable to this patient. Components are not calculated.          Revised Cardiac Risk Index    No data to display  Apfel Simplified Score    No data to display  Risk Analysis Index Results This Encounter    No data found in the last 10 encounters.       Prodigy: High Risk  Total Score: 12              Prodigy Age Score           ARISCAT Score for Postoperative Pulmonary Complications    No data to display  [place] Perioperative Risk for Myocardial Infarction or Cardiac Arrest (SEVERIANO)    No data to display      Nurse Plan of Action:     RN screening call complete.  Reviewed allergies, medications and pharmacy, medical, surgical and social history with patient.  Chart updated.  Instructed patient to stop IBU 1  week prior to surgery.             [1]   Past Medical History:  Diagnosis Date    Adverse effect of anesthesia     has experienced both hypotension and hypertension post op    Arthritis     Back pain with sciatica     left>right    Cancer (Multi) polycythemia vera    Recs in Heme note from Ajith Estrada MD at 7/2/2025 10:20 AM, asymptomatic on Low dose ASA and Hydroxurea    Carpal tunnel syndrome     left wrist-improved with brace    Cervical spinal stenosis     Constipation     Diverticulosis     Dry mouth     Erythrocytosis     Essential (primary) hypertension 10/26/2022    Hypertension    GERD (gastroesophageal reflux disease)     resolved after she stopped night time eating, no longer on meds    History of echocardiogram 08/05/2016    HTN (hypertension)     Hypothyroidism     Internal hemorrhoids     no blood in stool for years since taking daily metamucil    GIRISH-2 gene mutation     Left hip pain     Migraines     Neck pain     OAB (overactive bladder)     Osteopenia     Osteoporosis     Pure hypercholesterolemia, unspecified 08/12/2014    High cholesterol    Thrombocytosis     Tinnitus     buzzing in ears   [2]   Past Surgical History:  Procedure Laterality Date    COLONOSCOPY      KNEE ARTHROSCOPY W/ DEBRIDEMENT  12/10/2013    Arthroscopy Knee Left    OOPHORECTOMY  08/12/2014    Oophorectomy - Unilateral (Removal Of One Ovary)    TONSILLECTOMY  03/08/2022    Tonsillectomy    TOTAL HIP ARTHROPLASTY Right     TOTAL SHOULDER ARTHROPLASTY Left     WISDOM TOOTH EXTRACTION      WRIST SURGERY Left 01/30/2017    Wrist Surgery-fracture repair 2019   [3]   Family History  Problem Relation Name Age of Onset    Arthritis Mother emily thornton     Breast cancer Mother's Sister      Breast cancer Other      Hypertension Other      Lung cancer Other      Other (stroke syndrome) Other      Cancer Mother emily thornton     Arthritis Mother's Sister indu atwood     Cancer Mother's Sister seth orr     Cancer Mother's Sister  bob sofia     Cancer Mother's Sister aunt claudia     Stroke Father asia thornton     Lupus Mother emily thornton     Heart failure Father asia thornton    [4]   Allergies  Allergen Reactions    Sulfa (Sulfonamide Antibiotics) Rash

## 2025-07-24 ENCOUNTER — LAB (OUTPATIENT)
Dept: LAB | Facility: HOSPITAL | Age: 75
End: 2025-07-24
Payer: MEDICARE

## 2025-07-24 ENCOUNTER — PRE-ADMISSION TESTING (OUTPATIENT)
Dept: PREADMISSION TESTING | Facility: HOSPITAL | Age: 75
End: 2025-07-24
Payer: MEDICARE

## 2025-07-24 VITALS
BODY MASS INDEX: 37.88 KG/M2 | TEMPERATURE: 97 F | DIASTOLIC BLOOD PRESSURE: 77 MMHG | WEIGHT: 255.73 LBS | HEART RATE: 56 BPM | HEIGHT: 69 IN | OXYGEN SATURATION: 96 % | RESPIRATION RATE: 16 BRPM | SYSTOLIC BLOOD PRESSURE: 142 MMHG

## 2025-07-24 DIAGNOSIS — Z01.818 PREOP TESTING: Primary | ICD-10-CM

## 2025-07-24 DIAGNOSIS — Z01.818 ENCOUNTER FOR OTHER PREPROCEDURAL EXAMINATION: Primary | ICD-10-CM

## 2025-07-24 LAB
BASOPHILS # BLD AUTO: 0.03 X10*3/UL (ref 0–0.1)
BASOPHILS NFR BLD AUTO: 0.5 %
EOSINOPHIL # BLD AUTO: 0.09 X10*3/UL (ref 0–0.4)
EOSINOPHIL NFR BLD AUTO: 1.5 %
ERYTHROCYTE [DISTWIDTH] IN BLOOD BY AUTOMATED COUNT: 13.5 % (ref 11.5–14.5)
HCT VFR BLD AUTO: 44.1 % (ref 36–46)
HGB BLD-MCNC: 15.3 G/DL (ref 12–16)
IMM GRANULOCYTES # BLD AUTO: 0.02 X10*3/UL (ref 0–0.5)
IMM GRANULOCYTES NFR BLD AUTO: 0.3 % (ref 0–0.9)
LYMPHOCYTES # BLD AUTO: 1.6 X10*3/UL (ref 0.8–3)
LYMPHOCYTES NFR BLD AUTO: 27.3 %
MCH RBC QN AUTO: 35.9 PG (ref 26–34)
MCHC RBC AUTO-ENTMCNC: 34.7 G/DL (ref 32–36)
MCV RBC AUTO: 104 FL (ref 80–100)
MONOCYTES # BLD AUTO: 0.48 X10*3/UL (ref 0.05–0.8)
MONOCYTES NFR BLD AUTO: 8.2 %
NEUTROPHILS # BLD AUTO: 3.65 X10*3/UL (ref 1.6–5.5)
NEUTROPHILS NFR BLD AUTO: 62.2 %
NRBC BLD-RTO: 0 /100 WBCS (ref 0–0)
PLATELET # BLD AUTO: 223 X10*3/UL (ref 150–450)
RBC # BLD AUTO: 4.26 X10*6/UL (ref 4–5.2)
WBC # BLD AUTO: 5.9 X10*3/UL (ref 4.4–11.3)

## 2025-07-24 PROCEDURE — 36415 COLL VENOUS BLD VENIPUNCTURE: CPT

## 2025-07-24 PROCEDURE — 93010 ELECTROCARDIOGRAM REPORT: CPT | Performed by: INTERNAL MEDICINE

## 2025-07-24 PROCEDURE — 93005 ELECTROCARDIOGRAM TRACING: CPT | Performed by: PHYSICIAN ASSISTANT

## 2025-07-24 PROCEDURE — 85025 COMPLETE CBC W/AUTO DIFF WBC: CPT

## 2025-07-24 PROCEDURE — 87081 CULTURE SCREEN ONLY: CPT | Mod: AHULAB | Performed by: PHYSICIAN ASSISTANT

## 2025-07-24 PROCEDURE — 99204 OFFICE O/P NEW MOD 45 MIN: CPT | Performed by: PHYSICIAN ASSISTANT

## 2025-07-24 RX ORDER — CHLORHEXIDINE GLUCONATE ORAL RINSE 1.2 MG/ML
SOLUTION DENTAL
Qty: 475 ML | Refills: 0 | Status: SHIPPED | OUTPATIENT
Start: 2025-07-24

## 2025-07-24 ASSESSMENT — ENCOUNTER SYMPTOMS
LIGHT-HEADEDNESS: 0
CONFUSION: 0
NAUSEA: 0
DOUBLE VISION: 0
EXCESSIVE BLEEDING: 0
ABDOMINAL PAIN: 0
DYSPNEA WITH EXERTION: 0
BRUISES/BLEEDS EASILY: 1
DYSPNEA AT REST: 0
CHILLS: 0
COUGH: 0
NECK STIFFNESS: 0
DYSURIA: 0
ABDOMINAL DISTENTION: 0
TROUBLE SWALLOWING: 0
SKIN CHANGES: 0
SINUS CONGESTION: 0
PALPITATIONS: 0
ARTHRALGIAS: 1
HEMOPTYSIS: 0
FEVER: 0
WEAKNESS: 0
SHORTNESS OF BREATH: 0
WHEEZING: 0
MYALGIAS: 0
WOUND: 0
VOMITING: 0
DIARRHEA: 0
NECK PAIN: 0
CONSTIPATION: 0
EYE PAIN: 0
DIFFICULTY URINATING: 0
NUMBNESS: 0
RHINORRHEA: 0
EYE DISCHARGE: 0
BLOOD IN STOOL: 0
UNEXPECTED WEIGHT CHANGE: 0
LIMITED RANGE OF MOTION: 0
VISUAL CHANGE: 0

## 2025-07-24 NOTE — CPM/PAT NURSE NOTE
CPM/PAT Nurse Note      Name: Ileana Glover (Ileana Glover)  /Age: 1950/75 y.o.       Medical History[1]    Surgical History[2]    Patient  reports that she is not currently sexually active and has had partner(s) who are male. She reports using the following methods of birth control/protection: None and Other.    Family History[3]    Allergies[4]    Prior to Admission medications   Medication Sig Start Date End Date Taking? Authorizing Provider   acetaminophen (Tylenol 8 HOUR) 650 mg ER tablet Take 2 tablets (1,300 mg) by mouth every 8 hours if needed. 9/8/15  Yes Historical Provider, MD   aspirin 81 mg EC tablet Take 1 tablet (81 mg) by mouth once daily at bedtime. 19  Yes Historical Provider, MD   calcium carbonate-vitamin D3 500 mg-3.125 mcg (125 unit) tablet tablet Take by mouth once daily. 09  Yes Historical Provider, MD   estradiol (Estrace) 0.01 % (0.1 mg/gram) vaginal cream Please use a dime sized or pea-sized amount vaginally 3 times a week. 24  Yes Shawn Salazar MD   folic acid (Folvite) 1 mg tablet Take 1 tablet (1 mg) by mouth once daily. 25  Yes WEI Dong-CNP   hydroxyurea (Hydrea) 500 mg capsule 2 capsules PO every day with food - Except on  take 3 caps PO on   Yes Ajith Estrada MD    mg tablet TAKE ONE TABLET BY MOUTH EVERY 8 HOURS with food or milk AS NEEDED 23  Yes Historical Provider, MD   levothyroxine (Synthroid) 150 mcg tablet TAKE 1 TABLET ONCE DAILY INTHE MORNING BEFORE A MEAL 25  Yes Ghassan Bush MD   meloxicam (Mobic) 15 mg tablet Take 1 tablet (15 mg) by mouth once daily. 24 Yes Maurizio Nelson MD   metoprolol succinate XL (Toprol-XL) 50 mg 24 hr tablet TAKE 1 TABLET ONCE DAILY, DO NOT CRUSH OR CHEW (DISCARD PRESCRIPTION FOR METOPROLOL TARTRATE 25MG) 25  Yes Ghassan Bush MD   olmesartan (BENIcar) 20 mg tablet TAKE 1 TABLET ONCE DAILY 6/2/25  Yes Ghassan Bush MD    oxybutynin XL (Ditropan-XL) 10 mg 24 hr tablet Take 1 tablet (10 mg) by mouth 2 times a day. 10/15/24  Yes Shawn Salazar MD   psyllium (Metamucil) 3.4 gram packet Take 1 packet by mouth once daily.   Yes Historical Provider, MD   sennosides (Senokot) 8.6 mg tablet Take 1 tablet (8.6 mg) by mouth if needed for constipation.   Yes Historical Provider, MD   simvastatin (Zocor) 20 mg tablet Take 1 tablet (20 mg) by mouth once daily. 7/18/25  Yes Ghassan Bush MD   SUMAtriptan (Imitrex) 50 mg tablet Take 1 tablet (50 mg) by mouth 1 time if needed for migraine. May repeat after 2 hours. 7/17/25 7/17/26 Yes Ghassan Bush MD   amoxicillin (Amoxil) 500 mg capsule Take 4 capsules (2,000 mg) by mouth 1 time. Pre dental work  Patient not taking: Reported on 7/24/2025    Historical Provider, MD   chlorhexidine (Peridex) 0.12 % solution Swish for 30 seconds and spit 15mL of solution the night before and morning of surgery 7/24/25   Obdulia Wolfe PA-C   fluticasone (Flonase) 50 mcg/actuation nasal spray Administer 1 spray into each nostril once daily. Shake gently. Before first use, prime pump. After use, clean tip and replace cap.  Patient not taking: Reported on 7/23/2025 2/5/24 2/4/25  Wyatt Genao,    hydroxyurea (Hydrea) 500 mg capsule 2 capsules PO every day with food - Except on Wednesdays take 3 caps PO on Wednesdays 7/21/25   Ajith Estrada MD   azithromycin (Zithromax) 250 mg tablet Take 1 tablet (250 mg) by mouth once daily.  7/23/25  Historical Provider, MD   diclofenac sodium (Voltaren Arthritis Pain) 1 % gel Apply 4.5 inches (4 g) topically 4 times a day as needed (midfoot pain). 5/22/24 7/23/25  Anup Barrientos MD   hydrocortisone 2.5 % cream once daily. 8/24/21 7/23/25  Historical Provider, MD   hydroxyurea (Hydrea) 500 mg capsule 2 capsules PO every day with food - Except on Wednesdays take 3 caps PO on Wednesdays 5/27/25 7/21/25  Jayden Good MD   meloxicam (Mobic) 15 mg tablet TAKE ONE  TABLET BY MOUTH EVERY DAY 5/20/24 7/23/25  Maurizio Nelson MD   polyethylene glycol-electrolytes 420 gram solution Take by mouth. 5/4/23 7/23/25  Historical Provider, MD   sodium hyaluronate (Euflexxa) 10 mg/mL(mw 2.4 -3.6 million) injection Physician to inject 2ml into the left knee once a week for 3 weeks.  For office use only.  Patient not taking: Reported on 7/23/2025 5/2/25 7/23/25  Maurizio Nelson MD   triamcinolone (Kenalog) 0.1 % cream 0 7/5/23 7/23/25  Historical Provider, MD AZAM GILLETTE Risk Score    No data to display  Caprini DVT Assessment    No data to display  Modified Frailty Index    No data to display  [id number] Stroke Risk Points  Current as of 14 minutes ago        N/A 0 to 9 Points:      Last Change: N/A          The [id number] risk score (Lip GH, et al. 2009. © 2010 American College [employer] Chest [employer]) quantifies the risk of stroke for a patient with atrial fibrillation. For patients without atrial fibrillation or under the age of 18 this score appears as N/A. Higher score values generally indicate higher risk of stroke.        This score is not applicable to this patient. Components are not calculated.          Revised Cardiac Risk Index    No data to display  Apfel Simplified Score    No data to display  Risk Analysis Index Results This Encounter    No data found in the last 10 encounters.       Prodigy: High Risk  Total Score: 12              Prodigy Age Score           ARISCAT Score for Postoperative Pulmonary Complications      Flowsheet Row Pre-Admission Testing from 7/24/2025 in [facility] with COLT Fonseca Calculated Score 3 filed at 07/24/2025 0955   Preoperative SpO2 0 filed at 07/24/2025 0955   Respiratory infection in the last month Either upper or lower (i.e., URI, bronchitis, pneumonia), with fever and antibiotic treatment 0 filed at 07/24/2025 0955   Preoperative anemia (Hgb less than 10 g/dl) 0 filed at 07/24/2025 0955    Surgical incision  0 filed at 07/24/2025 0955   Duration of surgery  16 filed at 07/24/2025 0955   Emergency Procedure  0 filed at 07/24/2025 0955   ARISCAT Total Score  19 filed at 07/24/2025 0955     [place] Perioperative Risk for Myocardial Infarction or Cardiac Arrest (SEVERIANO)    No data to display      Nurse Plan of Action:                [1]   Past Medical History:  Diagnosis Date    Adverse effect of anesthesia     has experienced both hypotension and hypertension post op    Arthritis     Back pain with sciatica     left>right    Cancer (Multi) polycythemia vera    Recs in Heme note from Ajith Estrada MD at 7/2/2025 10:20 AM, asymptomatic on Low dose ASA and Hydroxurea    Carpal tunnel syndrome     left wrist-improved with brace    Cervical spinal stenosis     Constipation     Diverticulosis     Dry mouth     Erythrocytosis     Essential (primary) hypertension 10/26/2022    Hypertension    GERD (gastroesophageal reflux disease)     resolved after she stopped night time eating, no longer on meds    History of echocardiogram 08/05/2016    HTN (hypertension)     Hypothyroidism     Internal hemorrhoids     no blood in stool for years since taking daily metamucil    GIRISH-2 gene mutation     Left hip pain     Migraines     Neck pain     OAB (overactive bladder)     Osteopenia     Osteoporosis     Pure hypercholesterolemia, unspecified 08/12/2014    High cholesterol    Thrombocytosis     Tinnitus     buzzing in ears   [2]   Past Surgical History:  Procedure Laterality Date    COLONOSCOPY      KNEE ARTHROSCOPY W/ DEBRIDEMENT  12/10/2013    Arthroscopy Knee Left    OOPHORECTOMY  08/12/2014    Oophorectomy - Unilateral (Removal Of One Ovary)    TONSILLECTOMY  03/08/2022    Tonsillectomy    TOTAL HIP ARTHROPLASTY Right     TOTAL SHOULDER ARTHROPLASTY Left     WISDOM TOOTH EXTRACTION      WRIST SURGERY Left 01/30/2017    Wrist Surgery-fracture repair 2019   [3]   Family History  Problem Relation Name Age of Onset     Arthritis Mother emily thornton     Breast cancer Mother's Sister      Breast cancer Other      Hypertension Other      Lung cancer Other      Other (stroke syndrome) Other      Cancer Mother emily thornton     Arthritis Mother's Sister indu atwood     Cancer Mother's Sister seth orr     Cancer Mother's Sister bob sofia     Cancer Mother's Sister aunt claudia     Stroke Father asia thornton     Lupus Mother emily thornton     Heart failure Father asia thornton    [4]   Allergies  Allergen Reactions    Sulfa (Sulfonamide Antibiotics) Rash

## 2025-07-24 NOTE — H&P (VIEW-ONLY)
[facility]/PAT Evaluation       Name: Ileana Glover (Ileana Glover)  /Age: 1950/75 y.o.       Date of Consult: 25    Referring Provider: Dr. Nelson    Surgery, Date, and Length: Left Hip Total Arthroplasty ~ Posterior Approach - Left , 25, 130MIN    Ileana Glover is a 75 y.o. year-old female who presents to the [facility] for perioperative risk assessment prior to surgery.    Patient presents with a primary diagnosis of left hip osteoarthritis. Pt with chronic progressive left hip pain which prevents her from walking more than 1 block at a time, without resting. She had a right hip replacement performed  from which she has recovered well.  She is having pain in left groin and has difficulty donning and doffing shoes / socks.  She has tried steroid joint injections to left hip and ibuprofen and tylenol for pain control. She wishes to proceed with TJR as planned.     This note was created in part upon personal review of patient's medical records.      Patient is scheduled to have Left Hip Total Arthroplasty ~ Posterior Approach - Left     Pt admits to PONV and hypotension with anesthesia in the past.   Pt denies any past history of anesthetic complications such as decreased awareness, prolonged sedation, dental damage, aspiration, cardiac arrest, difficult intubation, difficult I.V. access or unexpected hospital admissions.  NO malignant hyperthermia and or pseudocholinesterase deficiency.  No history of blood transfusions     The patient is not a Church and will accept blood and blood products if medically indicated.   Type and screen NOT sent.     Past Medical History:   Diagnosis Date    Adverse effect of anesthesia     has experienced both hypotension and hypertension post op    Arthritis     Back pain with sciatica     left>right    Cancer (Multi) polycythemia vera    Recs in Heme note from Ajith Estrada MD at 2025 10:20 AM, asymptomatic on Low dose ASA and Hydroxurea    Carpal  tunnel syndrome     left wrist-improved with brace    Cervical spinal stenosis     Constipation     Diverticulosis     Dry mouth     Erythrocytosis     Essential (primary) hypertension 10/26/2022    Hypertension    GERD (gastroesophageal reflux disease)     resolved after she stopped night time eating, no longer on meds    History of echocardiogram 08/05/2016    HTN (hypertension)     Hypothyroidism     Internal hemorrhoids     no blood in stool for years since taking daily metamucil    GIRISH-2 gene mutation     Left hip pain     Migraines     Neck pain     OAB (overactive bladder)     Osteopenia     Osteoporosis     Pure hypercholesterolemia, unspecified 08/12/2014    High cholesterol    Thrombocytosis     Tinnitus     buzzing in ears       Past Surgical History:   Procedure Laterality Date    COLONOSCOPY      KNEE ARTHROSCOPY W/ DEBRIDEMENT  12/10/2013    Arthroscopy Knee Left    OOPHORECTOMY  08/12/2014    Oophorectomy - Unilateral (Removal Of One Ovary)    TONSILLECTOMY  03/08/2022    Tonsillectomy    TOTAL HIP ARTHROPLASTY Right     TOTAL SHOULDER ARTHROPLASTY Left     WISDOM TOOTH EXTRACTION      WRIST SURGERY Left 01/30/2017    Wrist Surgery-fracture repair 2019       Patient  reports that she is not currently sexually active and has had partner(s) who are male. She reports using the following methods of birth control/protection: None and Other.    Family History   Problem Relation Name Age of Onset    Arthritis Mother emily thornton     Breast cancer Mother's Sister      Breast cancer Other      Hypertension Other      Lung cancer Other      Other (stroke syndrome) Other      Cancer Mother emily thornton     Arthritis Mother's Sister indu rai     Cancer Mother's Sister seth kirby     Cancer Mother's Sister bob bismark     Cancer Mother's Sister aunt claudia     Stroke Father asia thornton     Lupus Mother emily thornton     Heart failure Father asia thornton      Social History     Socioeconomic History    Marital  status:      Spouse name: Not on file    Number of children: Not on file    Years of education: Not on file    Highest education level: Not on file   Occupational History    Not on file   Tobacco Use    Smoking status: Former     Current packs/day: 0.00     Types: Cigarettes     Quit date: 1972     Years since quittin.5    Smokeless tobacco: Never   Vaping Use    Vaping status: Never Used   Substance and Sexual Activity    Alcohol use: Yes     Comment: social- one drink once a month    Drug use: Never    Sexual activity: Not Currently     Partners: Male     Birth control/protection: None, Other   Other Topics Concern    Not on file   Social History Narrative    Not on file     Social Drivers of Health     Financial Resource Strain: Not on file   Food Insecurity: Not on file   Transportation Needs: Not on file   Physical Activity: Not on file   Stress: Not on file   Social Connections: Not on file   Intimate Partner Violence: Not on file   Housing Stability: Not on file        Allergies   Allergen Reactions    Sulfa (Sulfonamide Antibiotics) Rash       Current Outpatient Medications   Medication Instructions    acetaminophen (TYLENOL 8 HOUR) 1,300 mg, Every 8 hours PRN    amoxicillin (AMOXIL) 2,000 mg, Once    aspirin 81 mg EC tablet 1 tablet, Nightly    calcium carbonate-vitamin D3 500 mg-3.125 mcg (125 unit) tablet tablet Daily    chlorhexidine (Peridex) 0.12 % solution Swish for 30 seconds and spit 15mL of solution the night before and morning of surgery    estradiol (Estrace) 0.01 % (0.1 mg/gram) vaginal cream Please use a dime sized or pea-sized amount vaginally 3 times a week.    fluticasone (Flonase) 50 mcg/actuation nasal spray 1 spray, Each Nostril, Daily, Shake gently. Before first use, prime pump. After use, clean tip and replace cap.    folic acid (FOLVITE) 1 mg, oral, Daily    hydroxyurea (Hydrea) 500 mg capsule 2 capsules PO every day with food - Except on  take 3 caps PO on  Wednesdays    hydroxyurea (Hydrea) 500 mg capsule 2 capsules PO every day with food - Except on Wednesdays take 3 caps PO on Wednesdays     mg tablet TAKE ONE TABLET BY MOUTH EVERY 8 HOURS with food or milk AS NEEDED    meloxicam (MOBIC) 15 mg, oral, Daily    metoprolol succinate XL (Toprol-XL) 50 mg 24 hr tablet TAKE 1 TABLET ONCE DAILY, DO NOT CRUSH OR CHEW (DISCARD PRESCRIPTION FOR METOPROLOL TARTRATE 25MG)    olmesartan (BENICAR) 20 mg, oral, Daily    oxyBUTYnin XL (DITROPAN-XL) 10 mg, oral, 2 times daily    psyllium (Metamucil) 3.4 gram packet 1 packet, Daily    sennosides (Senokot) 8.6 mg tablet 1 tablet, As needed    simvastatin (ZOCOR) 20 mg, oral, Daily    SUMAtriptan (IMITREX) 50 mg, oral, Once as needed, May repeat after 2 hours.    Synthroid 150 mcg, oral, Every morning, Take before a meal           PAT ROS:   Constitutional:    no fever   no chills   no unexpected weight change  Neuro/Psych:    no numbness   no weakness   no light-headedness   no confusion  Eyes:    no discharge   no pain   no vision loss   no diplopia   no visual disturbance   use of corrective lenses  Ears:    no ear pain   no hearing loss   no tinnitus  Nose:    no nasal discharge   no sinus congestion   no epistaxis  Mouth:    no dental issues   no mouth pain   no oral bleeding   no mouth lesions  Throat:    no throat pain   no dysphagia  Neck:    no neck pain   no neck stiffness  Cardio:    Functional 4 Mets. Patient denies SOB walking up 2 flights of stairs   PT exercises; swimming 5 days a week; housework   no chest pain   no palpitations   no peripheral edema   no dyspnea   no MAGUIRE  Respiratory:    no cough   no wheezing   no hemoptysis   no shortness of breath  Endocrine:    no cold intolerance   no heat intolerance  GI:    no abdominal distention   no abdominal pain   no constipation   no diarrhea   no nausea   no vomiting   no blood in stool  :    no difficulty urinating   no dysuria   no oliguria  Musculoskeletal:     arthralgias (left hip; pain and decreased ROM with external rotation)   no myalgias   no decreased ROM  Hematologic:    bruises/bleeds easily (ASA 81mg)   no excessive bleeding   no history of blood transfusion   no blood clots  Skin:   no skin changes   no sores/wound   no rash      Physical Exam  Constitutional:       General: She is not in acute distress.     Appearance: Normal appearance. She is not ill-appearing, toxic-appearing or diaphoretic.   HENT:      Head: Normocephalic and atraumatic.      Nose: Nose normal. No congestion or rhinorrhea.      Mouth/Throat:      Mouth: Mucous membranes are moist.      Pharynx: No posterior oropharyngeal erythema.     Eyes:      Extraocular Movements: Extraocular movements intact.      Conjunctiva/sclera: Conjunctivae normal.       Cardiovascular:      Rate and Rhythm: Normal rate and regular rhythm.      Pulses: Normal pulses.      Heart sounds: Normal heart sounds. No murmur heard.     No friction rub. No gallop.   Pulmonary:      Effort: Pulmonary effort is normal. No respiratory distress.      Breath sounds: Normal breath sounds. No stridor. No wheezing, rhonchi or rales.   Abdominal:      General: Bowel sounds are normal. There is no distension.      Palpations: Abdomen is soft. There is no mass.      Tenderness: There is no abdominal tenderness. There is no guarding or rebound.      Hernia: No hernia is present.     Musculoskeletal:         General: Tenderness (left hip; pain and decreased ROM with left hip external rotation) present. No swelling, deformity or signs of injury. Normal range of motion.      Cervical back: Normal range of motion and neck supple. No rigidity or tenderness.     Skin:     General: Skin is warm and dry.      Coloration: Skin is not jaundiced or pale.      Findings: No bruising, erythema or rash.     Neurological:      General: No focal deficit present.      Mental Status: She is alert and oriented to person, place, and time.      Cranial  "Nerves: No cranial nerve deficit.      Sensory: No sensory deficit.      Motor: No weakness.      Coordination: Coordination normal.     Psychiatric:         Mood and Affect: Mood normal.         Behavior: Behavior normal.          PAT AIRWAY:   Airway:     Mallampati::  II    Neck ROM::  Full   Top right and bottom left missing tooth; no loose teeth; multiple implants and crowns      Visit Vitals  /77   Pulse 56   Temp 36.1 °C (97 °F)   Resp 16   Ht 1.75 m (5' 8.9\")   Wt 116 kg (255 lb 11.7 oz)   SpO2 96%   BMI 37.88 kg/m²   OB Status Postmenopausal   Smoking Status Former   BSA 2.37 m²          LABS:  Lab Results   Component Value Date    GLUCOSE 100 (H) 07/02/2025    CALCIUM 10.1 07/02/2025     07/02/2025    K 4.5 07/02/2025    CO2 29 07/02/2025     07/02/2025    BUN 29 (H) 07/02/2025    CREATININE 1.08 (H) 07/02/2025       Lab Results   Component Value Date    ALT 20 07/02/2025    AST 19 07/02/2025    ALKPHOS 59 07/02/2025    BILITOT 0.5 07/02/2025     Lab Results   Component Value Date    WBC 5.9 07/24/2025    HGB 15.3 07/24/2025    HCT 44.1 07/24/2025     (H) 07/24/2025     07/24/2025      Lab Results   Component Value Date    HGBA1C 5.5 07/17/2025      EKG 7/24/25  Sinus farhana  Low voltage QRS  Possible anterolateral infract, age undetermined  Abnormal EKG  Vent rate = 58 bpm    Assessment and Plan:     75 y.o.  female  scheduled for Left Hip Total Arthroplasty ~ Posterior Approach - Left  on 8/4/25 with Dr. Nelson for  left hip OA.   Presents to [facility] today for perioperative risk stratification and optimization      Cardiovascular:  Patient has no active cardiac symptoms.   Patient denies any chest pain, tightness, heaviness, pressure, radiating pain, palpitations, irregular heartbeats, lightheadedness, cough, congestion, shortness of breath, MAGUIRE, PND, near syncope, weight loss or gain.    METS: 4+  RCRI: 0 points; 0.5% risk for postoperative MACE     HTN - olmesartan HOLD " "evening prior to surgery and morning of surgery   ; cont metoprolol on dos  Encouraged lifestyle modifications, low-sodium diet, and increase activity as tolerated.  Monitor BP and follow up with managing physician for readings sustaining >140/90.    HLD - cont statin as prescribed    Bradycardia - pt on beta blocker which is a contributing factor     Pulmonary:  No pulmonary diagnosis, however patient is at increased risk of perioperative complications secondary to  age > 60, obesity, major surgery, duration of surgery > 2 hours  Stop Bang score is 3 placing patient at intermediate risk for ARIELLE  ARISCAT: <26 points, 1.6% risk of in-hospital postoperative pulmonary complication  PRODIGY: Moderate risk for opioid induced respiratory depression    **Pt provided with deep breathing exercises, incentive spirometer and instructions for use during PAT visit today**      Endocrine:  Hypothyroidism - cont levothyroxine on dos     Renal:  CKD3A  7/2/25 crt 1.08; GFR 53  Recommendations to avoid nephrotoxic drugs and carefully monitor fluid status to maintain euvolemia. Use dose adjusted medications as needed for the underlying level of renal function.     Hematology:  Polycythemia vera - +JAK2 - s/p bone marrow biopsy 5/2020; cont ASA 81mg and hydroxyurea on dos   7/2/25 H/H 16.7/49%  7/24/25 H/H 15.3/44.1%  Pt follows with Dr. 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 Has been on hydrea, tolerated well generally. Her dose was recently uptitrated and her disease is now better controlled.  She is generally asymptomatic other than fatigue.  Plan:  -Hydrea 1000 mg daily except on Wednesdays when she takes 1500 mg.  -folic acid, ASA 81mg   - if she has joint replacement will need post-op extended prophylaxis with anticoagulation, a DOAC would be reasonable. \"    Thrombocytosis  7/2/25  (wnl)  7/24/25  (wnl)  Patient instructed to " ambulate as soon as possible postoperatively to decrease thromboembolic risk.   Initiate mechanical DVT prophylaxis as soon as possible and initiate chemical prophylaxis when deemed safe from a bleeding standpoint post surgery.     LABS: CBC and MRSA and EKG ordered. CMP and A1c reviewed from 7/2/25 and 7/17/25; no need to repeat. CBC results reviewed from today and are unremarkable; H/H is wnl.    Caprini: 9    Risk assessment complete.  Patient is scheduled for a intermediate surgical risk procedure.        Preoperative medication instructions were provided and reviewed with the patient.  Any additional testing or evaluation was explained to the patient.  Nothing by mouth instructions were discussed and patient's questions were answered prior to conclusion to this encounter.  Patient verbalized understanding of preoperative instructions given in preadmission testing; discharge instructions available in EMR.    This note was dictated by a speech recognition.  Minor errors may have been detected in a speech recognition.

## 2025-07-24 NOTE — CPM/PAT NURSE NOTE
CPM/PAT Nurse Note      Name: Ileana Glover (Ileana Glover)  /Age: 1950/75 y.o.       Medical History[1]    Surgical History[2]    Patient  reports that she is not currently sexually active and has had partner(s) who are male. She reports using the following methods of birth control/protection: None and Other.    Family History[3]    Allergies[4]    Prior to Admission medications   Medication Sig Start Date End Date Taking? Authorizing Provider   acetaminophen (Tylenol 8 HOUR) 650 mg ER tablet Take 2 tablets (1,300 mg) by mouth every 8 hours if needed. 9/8/15  Yes Historical Provider, MD   aspirin 81 mg EC tablet Take 1 tablet (81 mg) by mouth once daily at bedtime. 19  Yes Historical Provider, MD   calcium carbonate-vitamin D3 500 mg-3.125 mcg (125 unit) tablet tablet Take by mouth once daily. 09  Yes Historical Provider, MD   estradiol (Estrace) 0.01 % (0.1 mg/gram) vaginal cream Please use a dime sized or pea-sized amount vaginally 3 times a week. 24  Yes Shawn Salazar MD   folic acid (Folvite) 1 mg tablet Take 1 tablet (1 mg) by mouth once daily. 25  Yes WEI Dong-CNP   hydroxyurea (Hydrea) 500 mg capsule 2 capsules PO every day with food - Except on  take 3 caps PO on   Yes Ajith Estrada MD    mg tablet TAKE ONE TABLET BY MOUTH EVERY 8 HOURS with food or milk AS NEEDED 23  Yes Historical Provider, MD   levothyroxine (Synthroid) 150 mcg tablet TAKE 1 TABLET ONCE DAILY INTHE MORNING BEFORE A MEAL 25  Yes Ghassan Bush MD   meloxicam (Mobic) 15 mg tablet Take 1 tablet (15 mg) by mouth once daily. 24 Yes Maurizio Nelson MD   metoprolol succinate XL (Toprol-XL) 50 mg 24 hr tablet TAKE 1 TABLET ONCE DAILY, DO NOT CRUSH OR CHEW (DISCARD PRESCRIPTION FOR METOPROLOL TARTRATE 25MG) 25  Yes Ghassan Bush MD   olmesartan (BENIcar) 20 mg tablet TAKE 1 TABLET ONCE DAILY 6/2/25  Yes Ghassan Bush MD    oxybutynin XL (Ditropan-XL) 10 mg 24 hr tablet Take 1 tablet (10 mg) by mouth 2 times a day. 10/15/24  Yes Shawn Salazar MD   psyllium (Metamucil) 3.4 gram packet Take 1 packet by mouth once daily.   Yes Historical Provider, MD   sennosides (Senokot) 8.6 mg tablet Take 1 tablet (8.6 mg) by mouth if needed for constipation.   Yes Historical Provider, MD   simvastatin (Zocor) 20 mg tablet Take 1 tablet (20 mg) by mouth once daily. 7/18/25  Yes Ghassan Bush MD   SUMAtriptan (Imitrex) 50 mg tablet Take 1 tablet (50 mg) by mouth 1 time if needed for migraine. May repeat after 2 hours. 7/17/25 7/17/26 Yes Ghassan Bush MD   amoxicillin (Amoxil) 500 mg capsule Take 4 capsules (2,000 mg) by mouth 1 time. Pre dental work  Patient not taking: Reported on 7/24/2025    Historical Provider, MD   chlorhexidine (Peridex) 0.12 % solution Swish for 30 seconds and spit 15mL of solution the night before and morning of surgery 7/24/25   Obdulia Wolfe PA-C   fluticasone (Flonase) 50 mcg/actuation nasal spray Administer 1 spray into each nostril once daily. Shake gently. Before first use, prime pump. After use, clean tip and replace cap.  Patient not taking: Reported on 7/23/2025 2/5/24 2/4/25  Wyatt Genao,    hydroxyurea (Hydrea) 500 mg capsule 2 capsules PO every day with food - Except on Wednesdays take 3 caps PO on Wednesdays 7/21/25   Ajith Estrada MD   azithromycin (Zithromax) 250 mg tablet Take 1 tablet (250 mg) by mouth once daily.  7/23/25  Historical Provider, MD   diclofenac sodium (Voltaren Arthritis Pain) 1 % gel Apply 4.5 inches (4 g) topically 4 times a day as needed (midfoot pain). 5/22/24 7/23/25  Anup Barrientos MD   hydrocortisone 2.5 % cream once daily. 8/24/21 7/23/25  Historical Provider, MD   hydroxyurea (Hydrea) 500 mg capsule 2 capsules PO every day with food - Except on Wednesdays take 3 caps PO on Wednesdays 5/27/25 7/21/25  Jayden Good MD   meloxicam (Mobic) 15 mg tablet TAKE ONE  TABLET BY MOUTH EVERY DAY 5/20/24 7/23/25  Maurizio Nelson MD   polyethylene glycol-electrolytes 420 gram solution Take by mouth. 5/4/23 7/23/25  Historical Provider, MD   sodium hyaluronate (Euflexxa) 10 mg/mL(mw 2.4 -3.6 million) injection Physician to inject 2ml into the left knee once a week for 3 weeks.  For office use only.  Patient not taking: Reported on 7/23/2025 5/2/25 7/23/25  Maurizio Nelson MD   triamcinolone (Kenalog) 0.1 % cream 0 7/5/23 7/23/25  Historical Provider, MD AZAM GILLETTE Risk Score    No data to display  Caprini DVT Assessment    No data to display  Modified Frailty Index    No data to display  [id number] Stroke Risk Points  Current as of 15 minutes ago        N/A 0 to 9 Points:      Last Change: N/A          The [id number]-MYHg risk score (Lip GH, et al. 2009. © 2010 [employer]) quantifies the risk of stroke for a patient with atrial fibrillation. For patients without atrial fibrillation or under the age of 18 this score appears as N/A. Higher score values generally indicate higher risk of stroke.        This score is not applicable to this patient. Components are not calculated.          Revised Cardiac Risk Index    No data to display  Apfel Simplified Score    No data to display  Risk Analysis Index Results This Encounter    No data found in the last 10 encounters.       Prodigy: High Risk  Total Score: 12              Prodigy Age Score           ARISCAT Score for Postoperative Pulmonary Complications      Flowsheet Row Pre-Admission Testing from 7/24/2025 in [facility] with COLT Fonseca Calculated Score 3 filed at 07/24/2025 0955   Preoperative SpO2 0 filed at 07/24/2025 0955   Respiratory infection in the last month Either upper or lower (i.e., URI, bronchitis, pneumonia), with fever and antibiotic treatment 0 filed at 07/24/2025 0955   Preoperative anemia (Hgb less than 10 g/dl) 0 filed at 07/24/2025 0955    Surgical incision  0 filed at 07/24/2025 0955   Duration of surgery  16 filed at 07/24/2025 0955   Emergency Procedure  0 filed at 07/24/2025 0955   ARISCAT Total Score  19 filed at 07/24/2025 0955     Miladis Perioperative Risk for Myocardial Infarction or Cardiac Arrest (SEVERIANO)    No data to display      Nurse Plan of Action:     After Visit Summary (AVS) reviewed and patient verbalized good understanding of medications and NPO instructions.  Pre-op infection prevention measures:  CHG showers and mouthwash reviewed, understanding voiced.  CHG soap given and patient verbalized need to pick CHG mouthwash at their preferred local pharmacy.              [1]   Past Medical History:  Diagnosis Date    Adverse effect of anesthesia     has experienced both hypotension and hypertension post op    Arthritis     Back pain with sciatica     left>right    Cancer (Multi) polycythemia vera    Recs in Heme note from Ajith Estrada MD at 7/2/2025 10:20 AM, asymptomatic on Low dose ASA and Hydroxurea    Carpal tunnel syndrome     left wrist-improved with brace    Cervical spinal stenosis     Constipation     Diverticulosis     Dry mouth     Erythrocytosis     Essential (primary) hypertension 10/26/2022    Hypertension    GERD (gastroesophageal reflux disease)     resolved after she stopped night time eating, no longer on meds    History of echocardiogram 08/05/2016    HTN (hypertension)     Hypothyroidism     Internal hemorrhoids     no blood in stool for years since taking daily metamucil    GIRISH-2 gene mutation     Left hip pain     Migraines     Neck pain     OAB (overactive bladder)     Osteopenia     Osteoporosis     Pure hypercholesterolemia, unspecified 08/12/2014    High cholesterol    Thrombocytosis     Tinnitus     buzzing in ears   [2]   Past Surgical History:  Procedure Laterality Date    COLONOSCOPY      KNEE ARTHROSCOPY W/ DEBRIDEMENT  12/10/2013    Arthroscopy Knee Left    OOPHORECTOMY  08/12/2014    Oophorectomy -  Unilateral (Removal Of One Ovary)    TONSILLECTOMY  03/08/2022    Tonsillectomy    TOTAL HIP ARTHROPLASTY Right     TOTAL SHOULDER ARTHROPLASTY Left     WISDOM TOOTH EXTRACTION      WRIST SURGERY Left 01/30/2017    Wrist Surgery-fracture repair 2019   [3]   Family History  Problem Relation Name Age of Onset    Arthritis Mother emily thornton     Breast cancer Mother's Sister      Breast cancer Other      Hypertension Other      Lung cancer Other      Other (stroke syndrome) Other      Cancer Mother emily thornton     Arthritis Mother's Sister indu rai     Cancer Mother's Sister seth kirby     Cancer Mother's Sister bob bismark     Cancer Mother's Sister aunt claudia     Stroke Father asia thornton     Lupus Mother emily thornton     Heart failure Father asia tohrnton    [4]   Allergies  Allergen Reactions    Sulfa (Sulfonamide Antibiotics) Rash

## 2025-07-24 NOTE — CPM/PAT H&P
[facility]/PAT Evaluation       Name: Ileana Glover (Ileana Glover)  /Age: 1950/75 y.o.       Date of Consult: 25    Referring Provider: Dr. Nelson    Surgery, Date, and Length: Left Hip Total Arthroplasty ~ Posterior Approach - Left , 25, 130MIN    Ileana Glover is a 75 y.o. year-old female who presents to the [facility] for perioperative risk assessment prior to surgery.    Patient presents with a primary diagnosis of left hip osteoarthritis. Pt with chronic progressive left hip pain which prevents her from walking more than 1 block at a time, without resting. She had a right hip replacement performed  from which she has recovered well.  She is having pain in left groin and has difficulty donning and doffing shoes / socks.  She has tried steroid joint injections to left hip and ibuprofen and tylenol for pain control. She wishes to proceed with TJR as planned.     This note was created in part upon personal review of patient's medical records.      Patient is scheduled to have Left Hip Total Arthroplasty ~ Posterior Approach - Left     Pt admits to PONV and hypotension with anesthesia in the past.   Pt denies any past history of anesthetic complications such as decreased awareness, prolonged sedation, dental damage, aspiration, cardiac arrest, difficult intubation, difficult I.V. access or unexpected hospital admissions.  NO malignant hyperthermia and or pseudocholinesterase deficiency.  No history of blood transfusions     The patient is not a [employer] and will accept blood and blood products if medically indicated.   Type and screen NOT sent.     Past Medical History:   Diagnosis Date    Adverse effect of anesthesia     has experienced both hypotension and hypertension post op    Arthritis     Back pain with sciatica     left>right    Cancer (Multi) polycythemia vera    Recs in Heme note from Ajith Estrada MD at 2025 10:20 AM, asymptomatic on Low dose ASA and Hydroxurea    Carpal  tunnel syndrome     left wrist-improved with brace    Cervical spinal stenosis     Constipation     Diverticulosis     Dry mouth     Erythrocytosis     Essential (primary) hypertension 10/26/2022    Hypertension    GERD (gastroesophageal reflux disease)     resolved after she stopped night time eating, no longer on meds    History of echocardiogram 08/05/2016    HTN (hypertension)     Hypothyroidism     Internal hemorrhoids     no blood in stool for years since taking daily metamucil    GIRISH-2 gene mutation     Left hip pain     Migraines     Neck pain     OAB (overactive bladder)     Osteopenia     Osteoporosis     Pure hypercholesterolemia, unspecified 08/12/2014    High cholesterol    Thrombocytosis     Tinnitus     buzzing in ears       Past Surgical History:   Procedure Laterality Date    COLONOSCOPY      KNEE ARTHROSCOPY W/ DEBRIDEMENT  12/10/2013    Arthroscopy Knee Left    OOPHORECTOMY  08/12/2014    Oophorectomy - Unilateral (Removal Of One Ovary)    TONSILLECTOMY  03/08/2022    Tonsillectomy    TOTAL HIP ARTHROPLASTY Right     TOTAL SHOULDER ARTHROPLASTY Left     WISDOM TOOTH EXTRACTION      WRIST SURGERY Left 01/30/2017    Wrist Surgery-fracture repair 2019       Patient  reports that she is not currently sexually active and has had partner(s) who are male. She reports using the following methods of birth control/protection: None and Other.    Family History   Problem Relation Name Age of Onset    Arthritis Mother emily thornton     Breast cancer Mother's Sister      Breast cancer Other      Hypertension Other      Lung cancer Other      Other (stroke syndrome) Other      Cancer Mother emily thornton     Arthritis Mother's Sister indu rai     Cancer Mother's Sister seth kirby     Cancer Mother's Sister bob bismark     Cancer Mother's Sister aunt claudia     Stroke Father asia thornton     Lupus Mother emily thornton     Heart failure Father asia thornton      Social History     Socioeconomic History    Marital  status:      Spouse name: Not on file    Number of children: Not on file    Years of education: Not on file    Highest education level: Not on file   Occupational History    Not on file   Tobacco Use    Smoking status: Former     Current packs/day: 0.00     Types: Cigarettes     Quit date: 1972     Years since quittin.5    Smokeless tobacco: Never   Vaping Use    Vaping status: Never Used   Substance and Sexual Activity    Alcohol use: Yes     Comment: social- one drink once a month    Drug use: Never    Sexual activity: Not Currently     Partners: Male     Birth control/protection: None, Other   Other Topics Concern    Not on file   Social History Narrative    Not on file     Social Drivers of Health     Financial Resource Strain: Not on file   Food Insecurity: Not on file   Transportation Needs: Not on file   Physical Activity: Not on file   Stress: Not on file   Social Connections: Not on file   Intimate Partner Violence: Not on file   Housing Stability: Not on file        Allergies   Allergen Reactions    Sulfa (Sulfonamide Antibiotics) Rash       Current Outpatient Medications   Medication Instructions    acetaminophen (TYLENOL 8 HOUR) 1,300 mg, Every 8 hours PRN    amoxicillin (AMOXIL) 2,000 mg, Once    aspirin 81 mg EC tablet 1 tablet, Nightly    calcium carbonate-vitamin D3 500 mg-3.125 mcg (125 unit) tablet tablet Daily    chlorhexidine (Peridex) 0.12 % solution Swish for 30 seconds and spit 15mL of solution the night before and morning of surgery    estradiol (Estrace) 0.01 % (0.1 mg/gram) vaginal cream Please use a dime sized or pea-sized amount vaginally 3 times a week.    fluticasone (Flonase) 50 mcg/actuation nasal spray 1 spray, Each Nostril, Daily, Shake gently. Before first use, prime pump. After use, clean tip and replace cap.    folic acid (FOLVITE) 1 mg, oral, Daily    hydroxyurea (Hydrea) 500 mg capsule 2 capsules PO every day with food - Except on  take 3 caps PO on  Wednesdays    hydroxyurea (Hydrea) 500 mg capsule 2 capsules PO every day with food - Except on Wednesdays take 3 caps PO on Wednesdays     mg tablet TAKE ONE TABLET BY MOUTH EVERY 8 HOURS with food or milk AS NEEDED    meloxicam (MOBIC) 15 mg, oral, Daily    metoprolol succinate XL (Toprol-XL) 50 mg 24 hr tablet TAKE 1 TABLET ONCE DAILY, DO NOT CRUSH OR CHEW (DISCARD PRESCRIPTION FOR METOPROLOL TARTRATE 25MG)    olmesartan (BENICAR) 20 mg, oral, Daily    oxyBUTYnin XL (DITROPAN-XL) 10 mg, oral, 2 times daily    psyllium (Metamucil) 3.4 gram packet 1 packet, Daily    sennosides (Senokot) 8.6 mg tablet 1 tablet, As needed    simvastatin (ZOCOR) 20 mg, oral, Daily    SUMAtriptan (IMITREX) 50 mg, oral, Once as needed, May repeat after 2 hours.    Synthroid 150 mcg, oral, Every morning, Take before a meal           PAT ROS:   Constitutional:    no fever   no chills   no unexpected weight change  Neuro/Psych:    no numbness   no weakness   no light-headedness   no confusion  Eyes:    no discharge   no pain   no vision loss   no diplopia   no visual disturbance   use of corrective lenses  Ears:    no ear pain   no hearing loss   no tinnitus  Nose:    no nasal discharge   no sinus congestion   no epistaxis  Mouth:    no dental issues   no mouth pain   no oral bleeding   no mouth lesions  Throat:    no throat pain   no dysphagia  Neck:    no neck pain   no neck stiffness  Cardio:    Functional 4 Mets. Patient denies SOB walking up 2 flights of stairs   PT exercises; swimming 5 days a week; housework   no chest pain   no palpitations   no peripheral edema   no dyspnea   no MAGUIRE  Respiratory:    no cough   no wheezing   no hemoptysis   no shortness of breath  Endocrine:    no cold intolerance   no heat intolerance  GI:    no abdominal distention   no abdominal pain   no constipation   no diarrhea   no nausea   no vomiting   no blood in stool  :    no difficulty urinating   no dysuria   no oliguria  Musculoskeletal:     arthralgias (left hip; pain and decreased ROM with external rotation)   no myalgias   no decreased ROM  Hematologic:    bruises/bleeds easily (ASA 81mg)   no excessive bleeding   no history of blood transfusion   no blood clots  Skin:   no skin changes   no sores/wound   no rash      Physical Exam  Constitutional:       General: She is not in acute distress.     Appearance: Normal appearance. She is not ill-appearing, toxic-appearing or diaphoretic.   HENT:      Head: Normocephalic and atraumatic.      Nose: Nose normal. No congestion or rhinorrhea.      Mouth/Throat:      Mouth: Mucous membranes are moist.      Pharynx: No posterior oropharyngeal erythema.     Eyes:      Extraocular Movements: Extraocular movements intact.      Conjunctiva/sclera: Conjunctivae normal.       Cardiovascular:      Rate and Rhythm: Normal rate and regular rhythm.      Pulses: Normal pulses.      Heart sounds: Normal heart sounds. No murmur heard.     No friction rub. No gallop.   Pulmonary:      Effort: Pulmonary effort is normal. No respiratory distress.      Breath sounds: Normal breath sounds. No stridor. No wheezing, rhonchi or rales.   Abdominal:      General: Bowel sounds are normal. There is no distension.      Palpations: Abdomen is soft. There is no mass.      Tenderness: There is no abdominal tenderness. There is no guarding or rebound.      Hernia: No hernia is present.     Musculoskeletal:         General: Tenderness (left hip; pain and decreased ROM with left hip external rotation) present. No swelling, deformity or signs of injury. Normal range of motion.      Cervical back: Normal range of motion and neck supple. No rigidity or tenderness.     Skin:     General: Skin is warm and dry.      Coloration: Skin is not jaundiced or pale.      Findings: No bruising, erythema or rash.     Neurological:      General: No focal deficit present.      Mental Status: She is alert and oriented to person, place, and time.      Cranial  "Nerves: No cranial nerve deficit.      Sensory: No sensory deficit.      Motor: No weakness.      Coordination: Coordination normal.     Psychiatric:         Mood and Affect: Mood normal.         Behavior: Behavior normal.          PAT AIRWAY:   Airway:     Mallampati::  II    Neck ROM::  Full   Top right and bottom left missing tooth; no loose teeth; multiple implants and crowns      Visit Vitals  /77   Pulse 56   Temp 36.1 °C (97 °F)   Resp 16   Ht 1.75 m (5' 8.9\")   Wt 116 kg (255 lb 11.7 oz)   SpO2 96%   BMI 37.88 kg/m²   OB Status Postmenopausal   Smoking Status Former   BSA 2.37 m²          LABS:  Lab Results   Component Value Date    GLUCOSE 100 (H) 07/02/2025    CALCIUM 10.1 07/02/2025     07/02/2025    K 4.5 07/02/2025    CO2 29 07/02/2025     07/02/2025    BUN 29 (H) 07/02/2025    CREATININE 1.08 (H) 07/02/2025       Lab Results   Component Value Date    ALT 20 07/02/2025    AST 19 07/02/2025    ALKPHOS 59 07/02/2025    BILITOT 0.5 07/02/2025     Lab Results   Component Value Date    WBC 5.9 07/24/2025    HGB 15.3 07/24/2025    HCT 44.1 07/24/2025     (H) 07/24/2025     07/24/2025      Lab Results   Component Value Date    HGBA1C 5.5 07/17/2025      EKG 7/24/25  Sinus farhana  Low voltage QRS  Possible anterolateral infract, age undetermined  Abnormal EKG  Vent rate = 58 bpm    Assessment and Plan:     75 y.o.  female  scheduled for Left Hip Total Arthroplasty ~ Posterior Approach - Left  on 8/4/25 with Dr. Nelson for  left hip OA.   Presents to [facility] today for perioperative risk stratification and optimization      Cardiovascular:  Patient has no active cardiac symptoms.   Patient denies any chest pain, tightness, heaviness, pressure, radiating pain, palpitations, irregular heartbeats, lightheadedness, cough, congestion, shortness of breath, MAGUIRE, PND, near syncope, weight loss or gain.    METS: 4+  RCRI: 0 points; 0.5% risk for postoperative MACE     HTN - olmesartan HOLD " "evening prior to surgery and morning of surgery   ; cont metoprolol on dos  Encouraged lifestyle modifications, low-sodium diet, and increase activity as tolerated.  Monitor BP and follow up with managing physician for readings sustaining >140/90.    HLD - cont statin as prescribed    Bradycardia - pt on beta blocker which is a contributing factor     Pulmonary:  No pulmonary diagnosis, however patient is at increased risk of perioperative complications secondary to  age > 60, obesity, major surgery, duration of surgery > 2 hours  Stop Bang score is 3 placing patient at intermediate risk for ARIELLE  ARISCAT: <26 points, 1.6% risk of in-hospital postoperative pulmonary complication  PRODIGY: Moderate risk for opioid induced respiratory depression    **Pt provided with deep breathing exercises, incentive spirometer and instructions for use during PAT visit today**      Endocrine:  Hypothyroidism - cont levothyroxine on dos     Renal:  CKD3A  7/2/25 crt 1.08; GFR 53  Recommendations to avoid nephrotoxic drugs and carefully monitor fluid status to maintain euvolemia. Use dose adjusted medications as needed for the underlying level of renal function.     Hematology:  Polycythemia vera - +JAK2 - s/p bone marrow biopsy 5/2020; cont ASA 81mg and hydroxyurea on dos   7/2/25 H/H 16.7/49%  7/24/25 H/H 15.3/44.1%  Pt follows with Dr. 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 Has been on hydrea, tolerated well generally. Her dose was recently uptitrated and her disease is now better controlled.  She is generally asymptomatic other than fatigue.  Plan:  -Hydrea 1000 mg daily except on Wednesdays when she takes 1500 mg.  -folic acid, ASA 81mg   - if she has joint replacement will need post-op extended prophylaxis with anticoagulation, a DOAC would be reasonable. \"    Thrombocytosis  7/2/25  (wnl)  7/24/25  (wnl)  Patient instructed to " ambulate as soon as possible postoperatively to decrease thromboembolic risk.   Initiate mechanical DVT prophylaxis as soon as possible and initiate chemical prophylaxis when deemed safe from a bleeding standpoint post surgery.     LABS: CBC and MRSA and EKG ordered. CMP and A1c reviewed from 7/2/25 and 7/17/25; no need to repeat. CBC results reviewed from today and are unremarkable; H/H is wnl.    Caprini: 9    Risk assessment complete.  Patient is scheduled for a intermediate surgical risk procedure.        Preoperative medication instructions were provided and reviewed with the patient.  Any additional testing or evaluation was explained to the patient.  Nothing by mouth instructions were discussed and patient's questions were answered prior to conclusion to this encounter.  Patient verbalized understanding of preoperative instructions given in preadmission testing; discharge instructions available in EMR.    This note was dictated by a speech recognition.  Minor errors may have been detected in a speech recognition.

## 2025-07-24 NOTE — PREPROCEDURE INSTRUCTIONS
Medication List            Accurate as of July 24, 2025  9:56 AM. Always use your most recent med list.                acetaminophen 650 mg ER tablet  Commonly known as: Tylenol 8 HOUR  Medication Adjustments for Surgery: Take/Use as prescribed     amoxicillin 500 mg capsule  Commonly known as: Amoxil  Medication Adjustments for Surgery: Take/Use as prescribed     aspirin 81 mg EC tablet  Medication Adjustments for Surgery: Take on the morning of surgery     calcium carbonate-vitamin D3 500 mg-3.125 mcg (125 unit) tablet tablet  Medication Adjustments for Surgery: Do Not take on the morning of surgery     chlorhexidine 0.12 % solution  Commonly known as: Peridex  Swish for 30 seconds and spit 15mL of solution the night before and morning of surgery  Medication Adjustments for Surgery: Take/Use as prescribed     estradiol 0.01 % (0.1 mg/gram) vaginal cream  Commonly known as: Estrace  Please use a dime sized or pea-sized amount vaginally 3 times a week.  Medication Adjustments for Surgery: Do Not take on the morning of surgery     fluticasone 50 mcg/actuation nasal spray  Commonly known as: Flonase  Administer 1 spray into each nostril once daily. Shake gently. Before first use, prime pump. After use, clean tip and replace cap.  Medication Adjustments for Surgery: Take/Use as prescribed     folic acid 1 mg tablet  Commonly known as: Folvite  Take 1 tablet (1 mg) by mouth once daily.  Medication Adjustments for Surgery: Do Not take on the morning of surgery     * hydroxyurea 500 mg capsule  Commonly known as: Hydrea  2 capsules PO every day with food - Except on Wednesdays take 3 caps PO on Wednesdays  Medication Adjustments for Surgery: Take on the morning of surgery     * hydroxyurea 500 mg capsule  Commonly known as: Hydrea  2 capsules PO every day with food - Except on Wednesdays take 3 caps PO on Wednesdays  Medication Adjustments for Surgery: Take on the morning of surgery      mg tablet  Generic drug:  ibuprofen  Additional Medication Adjustments for Surgery: Take last dose 7 days before surgery  Notes to patient: Last dose 7/27/25     meloxicam 15 mg tablet  Commonly known as: Mobic  Take 1 tablet (15 mg) by mouth once daily.  Additional Medication Adjustments for Surgery: Take last dose 7 days before surgery  Notes to patient: Last dose 7/27/25     metoprolol succinate XL 50 mg 24 hr tablet  Commonly known as: Toprol-XL  TAKE 1 TABLET ONCE DAILY, DO NOT CRUSH OR CHEW (DISCARD PRESCRIPTION FOR METOPROLOL TARTRATE 25MG)  Medication Adjustments for Surgery: Take on the morning of surgery     olmesartan 20 mg tablet  Commonly known as: BENIcar  TAKE 1 TABLET ONCE DAILY  Medication Adjustments for Surgery: Take last dose 1 day (24 hours) before surgery  Notes to patient: HOLD evening prior to surgery and morning of surgery        oxyBUTYnin XL 10 mg 24 hr tablet  Commonly known as: Ditropan-XL  Take 1 tablet (10 mg) by mouth 2 times a day.  Medication Adjustments for Surgery: Do Not take on the morning of surgery     psyllium 3.4 gram packet  Commonly known as: Metamucil  Medication Adjustments for Surgery: Do Not take on the morning of surgery     sennosides 8.6 mg tablet  Commonly known as: Senokot  Medication Adjustments for Surgery: Do Not take on the morning of surgery     simvastatin 20 mg tablet  Commonly known as: Zocor  Take 1 tablet (20 mg) by mouth once daily.  Medication Adjustments for Surgery: Take/Use as prescribed     SUMAtriptan 50 mg tablet  Commonly known as: Imitrex  Take 1 tablet (50 mg) by mouth 1 time if needed for migraine. May repeat after 2 hours.  Medication Adjustments for Surgery: Do Not take on the morning of surgery     Synthroid 150 mcg tablet  Generic drug: levothyroxine  TAKE 1 TABLET ONCE DAILY INTHE MORNING BEFORE A MEAL  Medication Adjustments for Surgery: Take on the morning of surgery           * This list has 2 medication(s) that are the same as other medications prescribed for  you. Read the directions carefully, and ask your doctor or other care provider to review them with you.                              **Concerning above medication instructions, if medication is normally taken at night, continue normal schedule.**  **DO NOT TAKE NIGHT PRIOR AND MORNING OF SURGERY**    CONTACT SURGEON'S OFFICE IF YOU DEVELOP:  * Fever = 100.4 F   * New respiratory symptoms (e.g. cough, shortness of breath, respiratory distress, sore throat)  * Recent loss of taste or smell  *Flu like symptoms such as headache, fatigue or gastrointestinal symptoms  * You develop any open sores, shingles, burning or painful urination   AND/OR:  * You no longer wish to have the surgery.  * Any other personal circumstances change that may lead to the need to cancel or defer this surgery.  *You were admitted to any hospital within one week of your planned procedure.    SMOKING:  *Quitting smoking can make a huge difference to your health and recovery from surgery.    *If you need help with quitting, call 1-468-QUIT-NOW.    THE DAY OF SURGERY:  *Do not eat any food after midnight the night before surgery.   *You must drink 13.5 ounces of clear liquids (i.e. water, black coffee (no milk or cream), tea, apple juice or electrolyte drinks (gatorade)) 2 hours before your arrival time.  *You may chew gum until 2 hours before your surgery    SURGICAL TIME  *You will be contacted between 2 p.m. and 6 p.m. the business day before your surgery with your arrival time.  *If you haven't received a call by 6pm, call [phone].  *Scheduled surgery times may change and you will be notified if this occurs-check your personal voicemail for any updates.    ON THE MORNING OF SURGERY:  *Wear comfortable, loose fitting clothing.   *Do not use moisturizers, creams, lotions or perfume.  *All jewelry and valuables should be left at home.  *Prosthetic devices such as contact lenses, hearing aids, dentures, eyelash extensions, hairpins and body  piercing must be removed before surgery.    BRING WITH YOU:  *Photo ID and insurance card  *Current list of medicines and allergies  *Pacemaker/Defibrillator/Heart stent cards  *CPAP machine and mask  *Slings/splints/crutches  *Copy of your complete Advanced Directive/DHPOA-if applicable  *Neurostimulator implant remote    PARKING AND ARRIVAL:  *Check in at the Main Entrance desk and let them know you are here for surgery.  *You will be directed to the 2nd floor surgical waiting area.    AFTER OUTPATIENT SURGERY:  *A responsible adult MUST accompany you at the time of discharge and stay with you for 24 hours after your surgery.  *You may NOT drive yourself home after surgery.  *You may use a taxi or ride sharing service ([employer], [employer]) to return home ONLY if you are accompanied by a friend or family member.  *Instructions for resuming your medications will be provided by your surgeon.       Home Preoperative Antibacterial Shower     What is a home preoperative antibacterial shower?  This shower is a way of cleaning the skin with a germ killing soap before surgery.  The soap contains chlorhexidine, commonly known as CHG.  CHG is a soap for your skin with germ killing ability.  Let your doctor know if you are allergic to chlorhexidine.    Why do I need to take a preoperative antibacterial shower?  Skin is not sterile.  It is best to try to make your skin as free of germs as possible before surgery.  Proper cleansing with a germ killing soap before surgery can lower the number of germs on your skin.  This helps to reduce the risk of infection at the surgical site.  Following the instructions listed below will help you prepare your skin for surgery.      How do I use the CHG skin cleanser?  Steps:  Begin using your CHG soap five days before your scheduled surgery on ________________________.    Days 1-4 Shower before bed:  Wash your face and genitals with your normal soap and rinse.           2.    Apply the CHG soap to a  clean wet washcloth.  Turn the water off or move away                From the water spray to avoid premature rinsing of the CHG soap as you are applying.     3.   Lather your entire body from the neck down.  Do not use on your face or genitals.  4. Pay special attention to the area(s) where your incision(s) will be located unless they are on your face.  Avoid scrubbing your skin too hard.  The important point is to have the CHG soap sit on your skin for 3 minutes.    When the 3 minutes are up, turn on the water and rinse the CHG soap off your body completely.   Pat yourself dry with a clean, freshly-laundered towel.  Dress in clean, freshly laundered night clothes.    Be sure to change bed sheets and blankets at least on the first night of CHG body wash use. May change linens every night of the above protocol for maximum benefit.   Day 5:  Last shower is the morning of surgery: Follow above Instructions.    NOTE:        *Keep CHG soap out of eyes and ear canals   *DO NOT wash with regular soap on your body after you have used the CHG soap solution  *DO NOT apply powders, lotions, or perfume.  *Deodorant may be used days 1-4, BUT NOT the day of surgery    Who should I contact if I have any questions regarding the use of CHG soap?  Call the [facility], Preadmission Testing at [phone] if you have any questions.              Patient Information: Pre-Operative Infection Prevention Measures     Why did I have my nose, under my arms and groin swabbed?  The purpose of the swab is to identify Staphylococcus aureus inside your nose or on your skin.  The swab was sent to the laboratory for culture.  A positive swab/culture for Staphylococcus aureus is called colonization or carriage.      What is Staphylococcus aureus?  Staphylococcus aureus, also known as “staph”, is a germ found on the skin or in the nose of healthy people.  Sometimes Staphylococcus aureus can get into the body and cause  an infection.  This can be minor (such as pimples, boils or other skin problems).  It might also be serious (such as blood infection, pneumonia or a surgical site infection).    What is Staphylococcus aureus colonization or carriage?  Colonization or carriage means that a person has the germ but is not sick from it.  These bacteria can be spread on the hands or when breathing or sneezing.    How is Staphylococcus aureus spread?  It is most often spread by close contact with a person or item that carries it.    What happens if my culture is positive for Staphylococcus aureus?  Your doctor/medical team will use this information to guide any antibiotic treatment which may be necessary.  Regardless of the culture results, we will clean the inside of your nose with a betadine swab just before you have your surgery.      Will I get an infection if I have Staphylococcus aureus in my nose or on my skin?  Anyone can get an infection with Staphylococcus aureus.  However, the best way to reduce your risk of infection is to follow the instructions provided to you for the use of your CHG soap and dental rinse.        Who should I contact if I have any questions?  Call the [facility], Preadmission Testing at [phone] if you have any questions.          Patient Information: Oral/Dental Rinse  **This is a prescription; pick it up at your preferred local pharmacy **  What is oral/dental rinse?   It is a mouthwash. It is a way of cleaning the mouth with a germ killing solution before your surgery.  The solution contains chlorhexidine, commonly known as CHG.   It is used inside the mouth to kill a bacteria known as Staphylococcus aureus.  Let your doctor know if you are allergic to Chlorhexidine.    Why do I need to use CHG oral/dental rinse?  The CHG oral/dental rinse helps to kill a bacteria in your mouth known a Staphylococcus aureus.     This reduces the risk of infection at the surgical site.       Using your CHG oral/dental rinse  STEPS:  Use your CHG oral/dental rinse after you brush your teeth the night before (at bedtime) and the morning of your surgery.  Follow all directions on your prescription label.    Use the cap on the container to measure 15ml (fill cap to fill line)  Swish (gargle if you can) the mouthwash in your mouth for at least 30 seconds, (do not to swallow) spit out  After you use your CHG rinse, do not rinse your mouth with water, drink or eat.  Please refer to prescription label for the appropriate time to resume oral intake  Dental rinse comes in one size bottle: 473ml ~16oz.  You will have leftover    rinse, discard after this use.    What side effects might I have using the CHG oral/dental rinse?  CHG rinse will stick to plaque on the teeth.  Brush and floss just before use.  Teeth brushing will help avoid staining of plaque during use.    Who should I contact if I have questions about the CHG oral/dental rinse?  Please call [facility], Preadmission Testing at [phone] if you have any questions          Incentive Spirometer   You were provided with an incentive spirometer in CPM/PAT, please follow the below instructions.    What is an incentive spirometer?  An incentive spirometer is a device used before and after surgery to “exercise” your lungs.  It helps you to take deeper breaths to expand your lungs.  Below is an example of a basic incentive spirometer.  The device you receive may differ slightly but they all function the same.    Why do I need to use an incentive spirometer?  Using your incentive spirometer prepares your lungs for surgery and helps prevent lung problems after surgery.  How do I use my incentive spirometer?  When you're using your incentive spirometer, make sure to breathe through your mouth. If you breathe through your nose, the incentive spirometer won't work properly. You can hold your nose if you have trouble.  If you  feel dizzy at any time, stop and rest. Try again at a later time.  Follow the steps below:  Set up your incentive spirometer, expand the flexible tubing and connect to the outlet.  Sit upright in a chair or bed. Hold the incentive spirometer at eye level.   Put the mouthpiece in your mouth and close your lips tightly around it. Slowly breathe out (exhale) completely.  Breathe in (inhale) slowly through your mouth as deeply as you can. As you take a breath, you will see the piston rise inside the large column. While the piston rises, the indicator should move upwards. It should stay in between the 2 arrows (see Figure).  Try to get the piston as high as you can, while keeping the indicator between the arrows.   If the indicator doesn't stay between the arrows, you're breathing either too fast or too slow.  When you get it as high as you can, hold your breath for 10 seconds, or as long as possible. While you're holding your breath, the piston will slowly fall to the base of the spirometer.  Once the piston reaches the bottom of the spirometer, breathe out slowly through your mouth. Rest for a few seconds.  Repeat 10 times. Try to get the piston to the same level with each breath.  Repeat every hour while awake  You can carefully clean the outside of the mouthpiece with an alcohol wipe or soap and water.        Preoperative Deep Breathing Exercises  Why it is important to do deep breathing exercises before my surgery?  Deep breathing exercises strengthen your breathing muscles.  This helps you to recover after your surgery and decreases the chance of breathing complications.  How are the deep breathing exercises done?  Sit straight with your back supported.  Breathe in deeply and slowly through your nose. Your lower rib cage should expand and your abdomen may move forward.  Hold that breath for 3 to 5 seconds.  Breathe out through pursed lips, slowly and completely.  Rest and repeat 10 times every hour while awake.   Rest longer if you become dizzy or lightheaded.        Preoperative Brain Exercises    What are brain exercises?  A brain exercise is any activity that engages your thinking (cognitive) skills.    What types of activities are considered brain exercises?  Jigsaw puzzles, crossword puzzles, word jumble, memory games, word search, and many more.  Many can be found free online or on your phone via a mobile ortiz.    Why should I do brain exercises before my surgery?  More recent research has shown brain exercise before surgery can lower the risk of postoperative delirium (confusion) which can be especially important for older adults.  Patients who did brain exercises for 5 to 10 hours (total) for the 7-10 days before surgery, cut their risk of postoperative delirium in half up to 1 week after surgery.

## 2025-07-25 LAB
ATRIAL RATE: 58 BPM
P AXIS: 50 DEGREES
P OFFSET: 184 MS
P ONSET: 134 MS
PR INTERVAL: 170 MS
Q ONSET: 219 MS
QRS COUNT: 10 BEATS
QRS DURATION: 86 MS
QT INTERVAL: 436 MS
QTC CALCULATION(BAZETT): 428 MS
QTC FREDERICIA: 430 MS
R AXIS: -8 DEGREES
T AXIS: 29 DEGREES
T OFFSET: 437 MS
VENTRICULAR RATE: 58 BPM

## 2025-07-26 LAB — STAPHYLOCOCCUS SPEC CULT: NORMAL

## 2025-07-28 ENCOUNTER — TELEPHONE (OUTPATIENT)
Dept: ORTHOPEDIC SURGERY | Facility: HOSPITAL | Age: 75
End: 2025-07-28
Payer: MEDICARE

## 2025-07-28 NOTE — TELEPHONE ENCOUNTER
Called patient to discuss upcoming surgery. Confirmed that the plan is for a same-day discharge. The patient has obtained their medical equipment. The patient does have a care partner to assist them at home postoperatively. They live in a house.  The patient does not have stairs required for navigating the home. The patient currently does not use an assistive device. Reminded patient to follow PAT instructions leading up to surgery and to watch for a phone call from preop the day prior to their surgery to receive details about arrival time. All questions answered at this time.

## 2025-08-04 ENCOUNTER — HOSPITAL ENCOUNTER (OUTPATIENT)
Facility: HOSPITAL | Age: 75
Setting detail: OUTPATIENT SURGERY
Discharge: HOME | End: 2025-08-04
Attending: ORTHOPAEDIC SURGERY | Admitting: ORTHOPAEDIC SURGERY
Payer: MEDICARE

## 2025-08-04 ENCOUNTER — DOCUMENTATION (OUTPATIENT)
Dept: HOME HEALTH SERVICES | Facility: HOME HEALTH | Age: 75
End: 2025-08-04

## 2025-08-04 ENCOUNTER — APPOINTMENT (OUTPATIENT)
Dept: RADIOLOGY | Facility: HOSPITAL | Age: 75
End: 2025-08-04
Payer: MEDICARE

## 2025-08-04 ENCOUNTER — ANESTHESIA (OUTPATIENT)
Dept: OPERATING ROOM | Facility: HOSPITAL | Age: 75
End: 2025-08-04
Payer: MEDICARE

## 2025-08-04 ENCOUNTER — PHARMACY VISIT (OUTPATIENT)
Dept: PHARMACY | Facility: CLINIC | Age: 75
End: 2025-08-04
Payer: COMMERCIAL

## 2025-08-04 ENCOUNTER — HOME HEALTH ADMISSION (OUTPATIENT)
Dept: HOME HEALTH SERVICES | Facility: HOME HEALTH | Age: 75
End: 2025-08-04
Payer: MEDICARE

## 2025-08-04 ENCOUNTER — ANESTHESIA EVENT (OUTPATIENT)
Dept: OPERATING ROOM | Facility: HOSPITAL | Age: 75
End: 2025-08-04
Payer: MEDICARE

## 2025-08-04 VITALS
TEMPERATURE: 97.3 F | RESPIRATION RATE: 18 BRPM | WEIGHT: 255.73 LBS | HEART RATE: 64 BPM | SYSTOLIC BLOOD PRESSURE: 145 MMHG | OXYGEN SATURATION: 96 % | DIASTOLIC BLOOD PRESSURE: 86 MMHG | HEIGHT: 69 IN | BODY MASS INDEX: 37.88 KG/M2

## 2025-08-04 DIAGNOSIS — Z96.642 S/P TOTAL LEFT HIP ARTHROPLASTY: ICD-10-CM

## 2025-08-04 DIAGNOSIS — M16.12 ARTHRITIS OF LEFT HIP: ICD-10-CM

## 2025-08-04 DIAGNOSIS — M17.12 ARTHRITIS OF LEFT KNEE: Primary | ICD-10-CM

## 2025-08-04 PROCEDURE — 97110 THERAPEUTIC EXERCISES: CPT | Mod: GP

## 2025-08-04 PROCEDURE — 2500000002 HC RX 250 W HCPCS SELF ADMINISTERED DRUGS (ALT 637 FOR MEDICARE OP, ALT 636 FOR OP/ED)

## 2025-08-04 PROCEDURE — 2500000004 HC RX 250 GENERAL PHARMACY W/ HCPCS (ALT 636 FOR OP/ED): Performed by: ORTHOPAEDIC SURGERY

## 2025-08-04 PROCEDURE — 2500000004 HC RX 250 GENERAL PHARMACY W/ HCPCS (ALT 636 FOR OP/ED): Mod: JW | Performed by: NURSE ANESTHETIST, CERTIFIED REGISTERED

## 2025-08-04 PROCEDURE — 2500000005 HC RX 250 GENERAL PHARMACY W/O HCPCS: Performed by: ORTHOPAEDIC SURGERY

## 2025-08-04 PROCEDURE — 2500000004 HC RX 250 GENERAL PHARMACY W/ HCPCS (ALT 636 FOR OP/ED): Mod: JZ | Performed by: STUDENT IN AN ORGANIZED HEALTH CARE EDUCATION/TRAINING PROGRAM

## 2025-08-04 PROCEDURE — C1713 ANCHOR/SCREW BN/BN,TIS/BN: HCPCS | Performed by: ORTHOPAEDIC SURGERY

## 2025-08-04 PROCEDURE — A27130 PR TOTAL HIP ARTHROPLASTY: Performed by: STUDENT IN AN ORGANIZED HEALTH CARE EDUCATION/TRAINING PROGRAM

## 2025-08-04 PROCEDURE — 97116 GAIT TRAINING THERAPY: CPT | Mod: GP

## 2025-08-04 PROCEDURE — 27130 TOTAL HIP ARTHROPLASTY: CPT | Performed by: ORTHOPAEDIC SURGERY

## 2025-08-04 PROCEDURE — 3600000018 HC OR TIME - INITIAL BASE CHARGE - PROCEDURE LEVEL SIX: Performed by: ORTHOPAEDIC SURGERY

## 2025-08-04 PROCEDURE — 97530 THERAPEUTIC ACTIVITIES: CPT | Mod: GP

## 2025-08-04 PROCEDURE — 2500000005 HC RX 250 GENERAL PHARMACY W/O HCPCS: Performed by: STUDENT IN AN ORGANIZED HEALTH CARE EDUCATION/TRAINING PROGRAM

## 2025-08-04 PROCEDURE — 72170 X-RAY EXAM OF PELVIS: CPT | Performed by: RADIOLOGY

## 2025-08-04 PROCEDURE — 2720000007 HC OR 272 NO HCPCS: Performed by: ORTHOPAEDIC SURGERY

## 2025-08-04 PROCEDURE — 72170 X-RAY EXAM OF PELVIS: CPT

## 2025-08-04 PROCEDURE — 2500000001 HC RX 250 WO HCPCS SELF ADMINISTERED DRUGS (ALT 637 FOR MEDICARE OP): Performed by: STUDENT IN AN ORGANIZED HEALTH CARE EDUCATION/TRAINING PROGRAM

## 2025-08-04 PROCEDURE — 7100000002 HC RECOVERY ROOM TIME - EACH INCREMENTAL 1 MINUTE: Performed by: ORTHOPAEDIC SURGERY

## 2025-08-04 PROCEDURE — 99100 ANES PT EXTEME AGE<1 YR&>70: CPT | Performed by: STUDENT IN AN ORGANIZED HEALTH CARE EDUCATION/TRAINING PROGRAM

## 2025-08-04 PROCEDURE — A27130 PR TOTAL HIP ARTHROPLASTY: Performed by: NURSE ANESTHETIST, CERTIFIED REGISTERED

## 2025-08-04 PROCEDURE — 7100000001 HC RECOVERY ROOM TIME - INITIAL BASE CHARGE: Performed by: ORTHOPAEDIC SURGERY

## 2025-08-04 PROCEDURE — 97161 PT EVAL LOW COMPLEX 20 MIN: CPT | Mod: GP

## 2025-08-04 PROCEDURE — 3700000001 HC GENERAL ANESTHESIA TIME - INITIAL BASE CHARGE: Performed by: ORTHOPAEDIC SURGERY

## 2025-08-04 PROCEDURE — RXMED WILLOW AMBULATORY MEDICATION CHARGE

## 2025-08-04 PROCEDURE — 2500000005 HC RX 250 GENERAL PHARMACY W/O HCPCS: Performed by: NURSE ANESTHETIST, CERTIFIED REGISTERED

## 2025-08-04 PROCEDURE — 3600000017 HC OR TIME - EACH INCREMENTAL 1 MINUTE - PROCEDURE LEVEL SIX: Performed by: ORTHOPAEDIC SURGERY

## 2025-08-04 PROCEDURE — C1776 JOINT DEVICE (IMPLANTABLE): HCPCS | Performed by: ORTHOPAEDIC SURGERY

## 2025-08-04 PROCEDURE — 3700000002 HC GENERAL ANESTHESIA TIME - EACH INCREMENTAL 1 MINUTE: Performed by: ORTHOPAEDIC SURGERY

## 2025-08-04 PROCEDURE — 2500000001 HC RX 250 WO HCPCS SELF ADMINISTERED DRUGS (ALT 637 FOR MEDICARE OP): Performed by: ORTHOPAEDIC SURGERY

## 2025-08-04 PROCEDURE — 2780000003 HC OR 278 NO HCPCS: Performed by: ORTHOPAEDIC SURGERY

## 2025-08-04 DEVICE — ACTIS DUOFIX HIP PROSTHESIS (FEMORAL STEM 12/14 TAPER CEMENTLESS SIZE 9 STD COLLAR)  CE
Type: IMPLANTABLE DEVICE | Site: HIP | Status: FUNCTIONAL
Brand: ACTIS

## 2025-08-04 DEVICE — PINNACLE CANCELLOUS BONE SCREW 6.5MM X 25MM
Type: IMPLANTABLE DEVICE | Site: HIP | Status: FUNCTIONAL
Brand: PINNACLE

## 2025-08-04 DEVICE — EMPHASYS ACETABULAR SHELL THREE-HOLE 54MM CEMENTLESS
Type: IMPLANTABLE DEVICE | Site: HIP | Status: FUNCTIONAL
Brand: EMPHASYS

## 2025-08-04 RX ORDER — PANTOPRAZOLE SODIUM 40 MG/1
40 TABLET, DELAYED RELEASE ORAL DAILY
Qty: 30 TABLET | Refills: 0 | Status: SHIPPED | OUTPATIENT
Start: 2025-08-04 | End: 2025-09-03

## 2025-08-04 RX ORDER — PREGABALIN 75 MG/1
75 CAPSULE ORAL ONCE
Status: COMPLETED | OUTPATIENT
Start: 2025-08-04 | End: 2025-08-04

## 2025-08-04 RX ORDER — ONDANSETRON HYDROCHLORIDE 2 MG/ML
4 INJECTION, SOLUTION INTRAVENOUS ONCE AS NEEDED
Status: DISCONTINUED | OUTPATIENT
Start: 2025-08-04 | End: 2025-08-07 | Stop reason: HOSPADM

## 2025-08-04 RX ORDER — DIPHENHYDRAMINE HYDROCHLORIDE 50 MG/ML
12.5 INJECTION, SOLUTION INTRAMUSCULAR; INTRAVENOUS ONCE AS NEEDED
Status: DISCONTINUED | OUTPATIENT
Start: 2025-08-04 | End: 2025-08-07 | Stop reason: HOSPADM

## 2025-08-04 RX ORDER — ACETAMINOPHEN 325 MG/1
975 TABLET ORAL ONCE
Status: COMPLETED | OUTPATIENT
Start: 2025-08-04 | End: 2025-08-04

## 2025-08-04 RX ORDER — LIDOCAINE HYDROCHLORIDE 20 MG/ML
INJECTION, SOLUTION EPIDURAL; INFILTRATION; INTRACAUDAL; PERINEURAL AS NEEDED
Status: DISCONTINUED | OUTPATIENT
Start: 2025-08-04 | End: 2025-08-04

## 2025-08-04 RX ORDER — LIDOCAINE HYDROCHLORIDE 10 MG/ML
0.1 INJECTION, SOLUTION EPIDURAL; INFILTRATION; INTRACAUDAL; PERINEURAL ONCE
Status: DISCONTINUED | OUTPATIENT
Start: 2025-08-04 | End: 2025-08-07 | Stop reason: HOSPADM

## 2025-08-04 RX ORDER — LABETALOL HYDROCHLORIDE 5 MG/ML
5 INJECTION, SOLUTION INTRAVENOUS ONCE AS NEEDED
Status: DISCONTINUED | OUTPATIENT
Start: 2025-08-04 | End: 2025-08-07 | Stop reason: HOSPADM

## 2025-08-04 RX ORDER — SODIUM CHLORIDE, SODIUM LACTATE, POTASSIUM CHLORIDE, CALCIUM CHLORIDE 600; 310; 30; 20 MG/100ML; MG/100ML; MG/100ML; MG/100ML
INJECTION, SOLUTION INTRAVENOUS CONTINUOUS PRN
Status: DISCONTINUED | OUTPATIENT
Start: 2025-08-04 | End: 2025-08-04

## 2025-08-04 RX ORDER — DOCUSATE SODIUM 100 MG/1
100 CAPSULE, LIQUID FILLED ORAL 2 TIMES DAILY
Qty: 60 CAPSULE | Refills: 0 | Status: SHIPPED | OUTPATIENT
Start: 2025-08-04 | End: 2025-09-03

## 2025-08-04 RX ORDER — SCOPOLAMINE 1 MG/3D
1 PATCH, EXTENDED RELEASE TRANSDERMAL
Status: COMPLETED | OUTPATIENT
Start: 2025-08-04 | End: 2025-08-07

## 2025-08-04 RX ORDER — SODIUM CHLORIDE 0.9 G/100ML
INJECTION, SOLUTION IRRIGATION AS NEEDED
Status: DISCONTINUED | OUTPATIENT
Start: 2025-08-04 | End: 2025-08-04 | Stop reason: HOSPADM

## 2025-08-04 RX ORDER — ROPIVACAINE/EPI/CLONIDINE/KET 2.46-0.005
SYRINGE (ML) INJECTION AS NEEDED
Status: DISCONTINUED | OUTPATIENT
Start: 2025-08-04 | End: 2025-08-04 | Stop reason: HOSPADM

## 2025-08-04 RX ORDER — FENTANYL CITRATE 50 UG/ML
INJECTION, SOLUTION INTRAMUSCULAR; INTRAVENOUS AS NEEDED
Status: DISCONTINUED | OUTPATIENT
Start: 2025-08-04 | End: 2025-08-04

## 2025-08-04 RX ORDER — PROPOFOL 10 MG/ML
INJECTION, EMULSION INTRAVENOUS CONTINUOUS PRN
Status: DISCONTINUED | OUTPATIENT
Start: 2025-08-04 | End: 2025-08-04

## 2025-08-04 RX ORDER — CEFAZOLIN 1 G/1
INJECTION, POWDER, FOR SOLUTION INTRAVENOUS AS NEEDED
Status: DISCONTINUED | OUTPATIENT
Start: 2025-08-04 | End: 2025-08-04

## 2025-08-04 RX ORDER — OXYCODONE HYDROCHLORIDE 5 MG/1
5 TABLET ORAL EVERY 4 HOURS PRN
Status: DISCONTINUED | OUTPATIENT
Start: 2025-08-04 | End: 2025-08-07 | Stop reason: HOSPADM

## 2025-08-04 RX ORDER — WATER 1 ML/ML
INJECTION IRRIGATION AS NEEDED
Status: DISCONTINUED | OUTPATIENT
Start: 2025-08-04 | End: 2025-08-04 | Stop reason: HOSPADM

## 2025-08-04 RX ORDER — MIDAZOLAM HYDROCHLORIDE 2 MG/2ML
INJECTION, SOLUTION INTRAMUSCULAR; INTRAVENOUS AS NEEDED
Status: DISCONTINUED | OUTPATIENT
Start: 2025-08-04 | End: 2025-08-04

## 2025-08-04 RX ORDER — KETOROLAC TROMETHAMINE 10 MG/1
10 TABLET, FILM COATED ORAL EVERY 6 HOURS PRN
Qty: 12 TABLET | Refills: 0 | Status: SHIPPED | OUTPATIENT
Start: 2025-08-04 | End: 2025-08-22

## 2025-08-04 RX ORDER — NORETHINDRONE AND ETHINYL ESTRADIOL 0.5-0.035
KIT ORAL AS NEEDED
Status: DISCONTINUED | OUTPATIENT
Start: 2025-08-04 | End: 2025-08-04

## 2025-08-04 RX ORDER — KETOROLAC TROMETHAMINE 30 MG/ML
INJECTION, SOLUTION INTRAMUSCULAR; INTRAVENOUS AS NEEDED
Status: DISCONTINUED | OUTPATIENT
Start: 2025-08-04 | End: 2025-08-04

## 2025-08-04 RX ORDER — ASPIRIN 81 MG/1
81 TABLET ORAL 2 TIMES DAILY
Qty: 60 TABLET | Refills: 0 | Status: SHIPPED | OUTPATIENT
Start: 2025-08-04 | End: 2025-09-03

## 2025-08-04 RX ORDER — POLYETHYLENE GLYCOL 3350 17 G/17G
17 POWDER, FOR SOLUTION ORAL DAILY
Qty: 510 G | Refills: 0 | Status: SHIPPED | OUTPATIENT
Start: 2025-08-04

## 2025-08-04 RX ORDER — SODIUM CHLORIDE, SODIUM LACTATE, POTASSIUM CHLORIDE, CALCIUM CHLORIDE 600; 310; 30; 20 MG/100ML; MG/100ML; MG/100ML; MG/100ML
100 INJECTION, SOLUTION INTRAVENOUS CONTINUOUS
Status: DISCONTINUED | OUTPATIENT
Start: 2025-08-04 | End: 2025-08-05 | Stop reason: HOSPADM

## 2025-08-04 RX ORDER — DEXMEDETOMIDINE IN 0.9 % NACL 20 MCG/5ML
SYRINGE (ML) INTRAVENOUS AS NEEDED
Status: DISCONTINUED | OUTPATIENT
Start: 2025-08-04 | End: 2025-08-04

## 2025-08-04 RX ORDER — ONDANSETRON HYDROCHLORIDE 2 MG/ML
INJECTION, SOLUTION INTRAVENOUS AS NEEDED
Status: DISCONTINUED | OUTPATIENT
Start: 2025-08-04 | End: 2025-08-04

## 2025-08-04 RX ORDER — TRANEXAMIC ACID 1 G/10ML
INJECTION, SOLUTION INTRAVENOUS AS NEEDED
Status: DISCONTINUED | OUTPATIENT
Start: 2025-08-04 | End: 2025-08-04

## 2025-08-04 RX ORDER — PHENYLEPHRINE HCL IN 0.9% NACL 1 MG/10 ML
SYRINGE (ML) INTRAVENOUS AS NEEDED
Status: DISCONTINUED | OUTPATIENT
Start: 2025-08-04 | End: 2025-08-04

## 2025-08-04 RX ORDER — OXYCODONE HYDROCHLORIDE 5 MG/1
5 TABLET ORAL EVERY 6 HOURS PRN
Qty: 28 TABLET | Refills: 0 | Status: SHIPPED | OUTPATIENT
Start: 2025-08-04 | End: 2025-08-11

## 2025-08-04 RX ORDER — ACETAMINOPHEN 500 MG
1000 TABLET ORAL EVERY 6 HOURS PRN
Qty: 240 TABLET | Refills: 0 | Status: SHIPPED | OUTPATIENT
Start: 2025-08-04 | End: 2025-09-03

## 2025-08-04 RX ORDER — TRANEXAMIC ACID 650 MG/1
1975 TABLET ORAL ONCE
Status: COMPLETED | OUTPATIENT
Start: 2025-08-04 | End: 2025-08-04

## 2025-08-04 RX ORDER — MELOXICAM 7.5 MG/1
7.5 TABLET ORAL ONCE
Status: COMPLETED | OUTPATIENT
Start: 2025-08-04 | End: 2025-08-04

## 2025-08-04 RX ADMIN — PROPOFOL 30 MG: 10 INJECTION, EMULSION INTRAVENOUS at 08:20

## 2025-08-04 RX ADMIN — DEXAMETHASONE SODIUM PHOSPHATE 4 MG: 4 INJECTION, SOLUTION INTRAMUSCULAR; INTRAVENOUS at 08:22

## 2025-08-04 RX ADMIN — HYDROMORPHONE HYDROCHLORIDE 0.5 MG: 1 INJECTION, SOLUTION INTRAMUSCULAR; INTRAVENOUS; SUBCUTANEOUS at 11:13

## 2025-08-04 RX ADMIN — Medication: at 10:45

## 2025-08-04 RX ADMIN — SODIUM CHLORIDE, POTASSIUM CHLORIDE, SODIUM LACTATE AND CALCIUM CHLORIDE: 600; 310; 30; 20 INJECTION, SOLUTION INTRAVENOUS at 08:05

## 2025-08-04 RX ADMIN — MEPIVACAINE HYDROCHLORIDE 4 ML: 15 INJECTION, SOLUTION EPIDURAL; INFILTRATION at 08:17

## 2025-08-04 RX ADMIN — PREGABALIN 75 MG: 75 CAPSULE ORAL at 07:09

## 2025-08-04 RX ADMIN — FENTANYL CITRATE 25 MCG: 50 INJECTION, SOLUTION INTRAMUSCULAR; INTRAVENOUS at 09:46

## 2025-08-04 RX ADMIN — PROPOFOL 20 MG: 10 INJECTION, EMULSION INTRAVENOUS at 08:06

## 2025-08-04 RX ADMIN — HYDROMORPHONE HYDROCHLORIDE 0.5 MG: 1 INJECTION, SOLUTION INTRAMUSCULAR; INTRAVENOUS; SUBCUTANEOUS at 11:01

## 2025-08-04 RX ADMIN — ONDANSETRON 4 MG: 2 INJECTION INTRAMUSCULAR; INTRAVENOUS at 09:37

## 2025-08-04 RX ADMIN — PROPOFOL 150 MCG/KG/MIN: 10 INJECTION, EMULSION INTRAVENOUS at 08:21

## 2025-08-04 RX ADMIN — CEFAZOLIN 2 G: 1 INJECTION, POWDER, FOR SOLUTION INTRAMUSCULAR; INTRAVENOUS at 08:22

## 2025-08-04 RX ADMIN — MIDAZOLAM HYDROCHLORIDE 2 MG: 1 INJECTION, SOLUTION INTRAMUSCULAR; INTRAVENOUS at 10:23

## 2025-08-04 RX ADMIN — ACETAMINOPHEN 975 MG: 325 TABLET ORAL at 07:09

## 2025-08-04 RX ADMIN — EPHEDRINE SULFATE 10 MG: 50 INJECTION, SOLUTION INTRAVENOUS at 09:11

## 2025-08-04 RX ADMIN — MIDAZOLAM HYDROCHLORIDE 2 MG: 1 INJECTION, SOLUTION INTRAMUSCULAR; INTRAVENOUS at 08:04

## 2025-08-04 RX ADMIN — POVIDONE-IODINE 1 APPLICATION: 5 SOLUTION TOPICAL at 07:09

## 2025-08-04 RX ADMIN — LIDOCAINE HYDROCHLORIDE 60 MG: 20 INJECTION, SOLUTION EPIDURAL; INFILTRATION; INTRACAUDAL; PERINEURAL at 08:22

## 2025-08-04 RX ADMIN — Medication 100 MCG: at 08:40

## 2025-08-04 RX ADMIN — KETOROLAC TROMETHAMINE 30 MG: 30 INJECTION, SOLUTION INTRAMUSCULAR at 09:58

## 2025-08-04 RX ADMIN — TRANEXAMIC ACID 1000 MG: 100 INJECTION, SOLUTION INTRAVENOUS at 08:22

## 2025-08-04 RX ADMIN — EPHEDRINE SULFATE 10 MG: 50 INJECTION, SOLUTION INTRAVENOUS at 09:31

## 2025-08-04 RX ADMIN — Medication 8 MCG: at 10:27

## 2025-08-04 RX ADMIN — EPHEDRINE SULFATE 20 MG: 50 INJECTION, SOLUTION INTRAVENOUS at 08:53

## 2025-08-04 RX ADMIN — FENTANYL CITRATE 25 MCG: 50 INJECTION, SOLUTION INTRAMUSCULAR; INTRAVENOUS at 08:06

## 2025-08-04 RX ADMIN — SCOPOLAMINE 1 PATCH: 1.5 PATCH, EXTENDED RELEASE TRANSDERMAL at 07:09

## 2025-08-04 RX ADMIN — Medication 12 MCG: at 10:23

## 2025-08-04 RX ADMIN — FENTANYL CITRATE 25 MCG: 50 INJECTION, SOLUTION INTRAMUSCULAR; INTRAVENOUS at 09:58

## 2025-08-04 RX ADMIN — FENTANYL CITRATE 25 MCG: 50 INJECTION, SOLUTION INTRAMUSCULAR; INTRAVENOUS at 09:26

## 2025-08-04 RX ADMIN — TRANEXAMIC ACID 1950 MG: 650 TABLET ORAL at 11:55

## 2025-08-04 RX ADMIN — MELOXICAM 7.5 MG: 7.5 TABLET ORAL at 07:08

## 2025-08-04 RX ADMIN — PROPOFOL 20 MG: 10 INJECTION, EMULSION INTRAVENOUS at 08:10

## 2025-08-04 RX ADMIN — HYDROMORPHONE HYDROCHLORIDE 0.5 MG: 1 INJECTION, SOLUTION INTRAMUSCULAR; INTRAVENOUS; SUBCUTANEOUS at 10:52

## 2025-08-04 RX ADMIN — EPHEDRINE SULFATE 10 MG: 50 INJECTION, SOLUTION INTRAVENOUS at 09:05

## 2025-08-04 RX ADMIN — OXYCODONE HYDROCHLORIDE 5 MG: 5 TABLET ORAL at 11:23

## 2025-08-04 ASSESSMENT — COGNITIVE AND FUNCTIONAL STATUS - GENERAL
CLIMB 3 TO 5 STEPS WITH RAILING: A LITTLE
MOVING TO AND FROM BED TO CHAIR: A LITTLE
MOVING FROM LYING ON BACK TO SITTING ON SIDE OF FLAT BED WITH BEDRAILS: A LITTLE
WALKING IN HOSPITAL ROOM: TOTAL
STANDING UP FROM CHAIR USING ARMS: A LITTLE
CLIMB 3 TO 5 STEPS WITH RAILING: TOTAL
TURNING FROM BACK TO SIDE WHILE IN FLAT BAD: A LITTLE
STANDING UP FROM CHAIR USING ARMS: A LITTLE
WALKING IN HOSPITAL ROOM: A LITTLE
MOBILITY SCORE: 14
MOVING FROM LYING ON BACK TO SITTING ON SIDE OF FLAT BED WITH BEDRAILS: A LITTLE
MOBILITY SCORE: 18
MOVING TO AND FROM BED TO CHAIR: A LITTLE
TURNING FROM BACK TO SIDE WHILE IN FLAT BAD: A LITTLE

## 2025-08-04 ASSESSMENT — PAIN - FUNCTIONAL ASSESSMENT

## 2025-08-04 ASSESSMENT — PAIN DESCRIPTION - DESCRIPTORS: DESCRIPTORS: ACHING

## 2025-08-04 ASSESSMENT — COLUMBIA-SUICIDE SEVERITY RATING SCALE - C-SSRS
6. HAVE YOU EVER DONE ANYTHING, STARTED TO DO ANYTHING, OR PREPARED TO DO ANYTHING TO END YOUR LIFE?: NO
2. HAVE YOU ACTUALLY HAD ANY THOUGHTS OF KILLING YOURSELF?: NO
1. IN THE PAST MONTH, HAVE YOU WISHED YOU WERE DEAD OR WISHED YOU COULD GO TO SLEEP AND NOT WAKE UP?: NO

## 2025-08-04 ASSESSMENT — PAIN SCALES - GENERAL
PAINLEVEL_OUTOF10: 0 - NO PAIN
PAINLEVEL_OUTOF10: 3
PAINLEVEL_OUTOF10: 3
PAINLEVEL_OUTOF10: 0 - NO PAIN
PAINLEVEL_OUTOF10: 7
PAINLEVEL_OUTOF10: 0 - NO PAIN
PAINLEVEL_OUTOF10: 2
PAINLEVEL_OUTOF10: 0 - NO PAIN
PAINLEVEL_OUTOF10: 3
PAINLEVEL_OUTOF10: 2
PAINLEVEL_OUTOF10: 2
PAINLEVEL_OUTOF10: 0 - NO PAIN
PAINLEVEL_OUTOF10: 8

## 2025-08-04 ASSESSMENT — PAIN DESCRIPTION - ORIENTATION
ORIENTATION: LEFT
ORIENTATION: LEFT

## 2025-08-04 ASSESSMENT — ACTIVITIES OF DAILY LIVING (ADL): ADL_ASSISTANCE: INDEPENDENT

## 2025-08-04 ASSESSMENT — PAIN DESCRIPTION - LOCATION: LOCATION: HIP

## 2025-08-04 NOTE — ANESTHESIA PREPROCEDURE EVALUATION
Patient: Ileana Glover    Procedure Information       Date/Time: 08/04/25 0735    Procedure: Left Hip Total Arthroplasty ~ Posterior Approach (Left: Hip)    Location: [facility] A OR 17 / [facility] A OR    Surgeons: Maurizio Nelson MD          History Comments   Essential (primary) hypertension Hypertension   Pure hypercholesterolemia, unspecified High cholesterol   Arthritis    Cancer (Multi) Recs in Heme note from Ajith Estrada MD at 7/2/2025 10:20 AM, asymptomatic on Low dose ASA and Hydroxurea   Hypothyroidism    Adverse effect of anesthesia has experienced both hypotension and hypertension post op   GERD (gastroesophageal reflux disease) resolved after she stopped night time eating, no longer on meds   HTN (hypertension)    Left hip pain    Osteopenia    Osteoporosis    Internal hemorrhoids no blood in stool for years since taking daily metamucil   History of echocardiogram    Carpal tunnel syndrome left wrist-improved with brace   Cervical spinal stenosis    Neck pain    GIRISH-2 gene mutation    Thrombocytosis    Erythrocytosis    Back pain with sciatica left>right   OAB (overactive bladder)    Diverticulosis    Constipation    Migraines    Tinnitus buzzing in ears   Dry mouth        Relevant Problems   Cardiac   (+) Hyperlipidemia   (+) Hypertension      Neuro   (+) Carpal tunnel syndrome   (+) Cervical radiculopathy at C6   (+) Low back pain with sciatica      GI   (+) BRBPR (bright red blood per rectum)   (+) Bleeding hemorrhoids   (+) GERD (gastroesophageal reflux disease)   (+) Gastroesophageal reflux disease   (+) Rectal hemorrhage      Endocrine   (+) Hypothyroidism   (+) Obesity   (+) Obesity, unspecified   (+) Severe obesity (Multi)      Hematology   (+) Erythrocytosis   (+) JAK2 positive polycythemia vera (Multi)      Musculoskeletal   (+) Carpal tunnel syndrome   (+) Osteoarthritis      ID   (+) Acute bacterial bronchitis      Skin   (+) Rash   (+) Rash and nonspecific skin eruption       Clinical  information reviewed:                   NPO Detail:  No data recorded     Physical Exam    Airway  Mallampati: II  TM distance: >3 FB  Neck ROM: full     Cardiovascular   Rhythm: regular  Rate: normal     Dental    Pulmonary Breath sounds clear to auscultation     Abdominal            Anesthesia Plan    History of general anesthesia?: yes  History of complications of general anesthesia?: no    ASA 3     spinal     intravenous induction   Anesthetic plan and risks discussed with patient.    Plan discussed with CRNA.

## 2025-08-04 NOTE — PROGRESS NOTES
Occupational Therapy                 Therapy Communication Note    Patient Name: Ileana Glover  MRN: [id number]  Department: [facility] PACU  Room: [facility] OR  Today's Date: 8/4/2025     Discipline: Occupational Therapy    Missed Visit: OT Missed Visit: Yes     Missed Visit Reason: Missed Visit Reason: Other (Comment) (Spoke with PT, pt. had previous  NOEMI sx, pt. has all necessary equiptment. Pt. declined having skilled OT services. OT to d/c order)    Missed Time: Attempt

## 2025-08-04 NOTE — ANESTHESIA POSTPROCEDURE EVALUATION
Patient: Ileana Glover    Procedure Summary       Date: 08/04/25 Room / Location: [facility] OR  / [facility]    Anesthesia Start: 0805 Anesthesia Stop: 1050    Procedure: Left Hip Total Arthroplasty ~ Posterior Approach (Left: Hip) Diagnosis:       Arthritis of left hip      (Arthritis of left hip [M16.12])    Surgeons: Maurizio Nelson MD Responsible Provider: Otis Otriz MD    Anesthesia Type: spinal ASA Status: 3            Anesthesia Type: spinal    Vitals Value Taken Time   /83 08/04/25 12:15   Temp 36.2 °C (97.2 °F) 08/04/25 12:15   Pulse 65 08/04/25 12:15   Resp 19 08/04/25 12:15   SpO2 95 % 08/04/25 12:15       Anesthesia Post Evaluation    Patient participation: complete - patient participated  Level of consciousness: awake  Pain management: satisfactory to patient  Airway patency: patent  Cardiovascular status: acceptable and hemodynamically stable  Respiratory status: acceptable and nonlabored ventilation  Hydration status: balanced  Postoperative Nausea and Vomiting: none        No notable events documented.

## 2025-08-04 NOTE — PROGRESS NOTES
Physical Therapy    Physical Therapy Evaluation & Treatment    Patient Name: Ileana Glover  MRN: [id number]  Department: [facility] PACU  [place]: [facility] [place]  Today's Date: 8/4/2025   Time Calculation  Start Time: 1315  Stop Time: 1400  Time Calculation (min): 45 min    Completion of this session, clinical decision making, and documentation performed under the supervision/direction of Maame Bailon DPT.    Assessment/Plan   PT Assessment  PT Assessment Results: Decreased strength, Decreased range of motion, Decreased endurance, Impaired balance, Decreased mobility, Decreased skin integrity, Orthopedic restrictions, Pain  Rehab Prognosis: Excellent  Barriers to Discharge Home: No anticipated barriers  Evaluation/Treatment Tolerance: Patient limited by fatigue, Other (comment) (orthostasis)  Medical Staff Made Aware: Yes  Strengths: Ability to acquire knowledge, Access to adaptive/assistive products, Attitude of self, Insight into problems, Premorbid level of function, Rehab experience, Support of extended family/friends  Barriers to Participation: Comorbidities  End of Session Communication: Bedside nurse  Assessment Comment: Pt is POD # 0 s/p L NOEMI with posterior post-op hip precautions and is WBAT. The pt presents with decreased independence with bed mobility, and transfers this session d/t lightheadedness/dizziness. Session was terminated early d/t decreased upright tolerance. The pt required cueing for maintenance of NOEMI precautions with functional mobility. Contributing to these impairments are post-op pain, decreased L hip strength, decreased balance, and impaired ROM. The patient would benefit from continued PT to assess progress and further therapy needs, address the above functional limitations and impairments to improve independence and safety to allow for anticipated d/c to home.  End of Session Patient Position: Alarm off, not on at start of session, On cart   IP OR SWING BED PT PLAN  Inpatient or Swing  Bed: Inpatient  PT Plan  Treatment/Interventions: Bed mobility, Transfer training, Gait training, Stair training, Balance training, Neuromuscular re-education, Strengthening, Endurance training, Range of motion, Therapeutic exercise, Therapeutic activity, Home exercise program  PT Plan: Ongoing PT  PT Frequency: BID  PT Discharge Recommendations: Low intensity level of continued care  Equipment Recommended upon Discharge: Other (comment) (Pt has 2WW at home)  PT Recommended Transfer Status: Assist x1, Assistive device (2WW)  PT - OK to Discharge: Yes      Subjective     PT Visit Info:  PT Received On: 08/04/25  General Visit Information:  General  Reason for Referral: Pt is a 75 y.o. female POD #0 s/p L NOEMI with posterior approach.  Referred By: Maurizio Nelson MD  Past Medical History Relevant to Rehab: Medical History[1] Surgical History[2]   Family/Caregiver Present: No  Prior to Session Communication: Bedside nurse  Patient Position Received: On cart, Alarm off, not on at start of session  Preferred Learning Style: auditory, kinesthetic, verbal, visual  General Comment: Pt pleasant and agreeable to PT session. Reports history of orthostasis after surgery. Pt presenting with dizziness/lightheadedness with mobility this session, resulting in early termination of session.     Home Living:  Home Living  Type of Home: House  Lives With: Alone  Home Adaptive Equipment: Walker rolling or standard, Cane, Reacher, Sock aid, Long-handled sponge, Long-handled shoehorn (2WW and rollator)  Home Layout: One level  Home Access: Level entry  Prior Level of Function:  Prior Function Per Pt/Caregiver Report  Level of [place]: Independent with ADLs and functional transfers, Independent with homemaking with ambulation  ADL Assistance: Independent (Mod I with equipment)  Homemaking Assistance: Independent (has a  that assists with cleaning)  Ambulatory Assistance: Independent (Mod I with rollator prior to surgery.  Will occasionally use cane.)  Prior Function Comments: Pt's cousin will be staying with her tonight, and her brother is also aviable to help if needed. Pt usually sleeps in recliner. Pt reports x1 fall in the past two months caused by slipping at the pool - reports no serious injuries from fall.  Precautions:  Precautions  LE Weight Bearing Status: Weight Bearing as Tolerated (LLE)  Medical Precautions: Fall precautions  Post-Surgical Precautions: Left hip precautions (posterior)    Vital Signs Comment: BP monitored throughout session d/t hx of orthostasis after surgery. Pt BP in supine 126/67 (MAP 85), seated /115 (), and standing 137/87 (). Pt returned to bed after standing trial d/t symptoms of dizziness and appearing pale. RN aware.     Objective   Pain:  Pain Assessment  Pain Assessment: 0-10  0-10 (Numeric) Pain Score: 0 - No pain  Cognition:  Cognition  Overall Cognitive Status: Within Functional Limits  Orientation Level: Oriented X4    General Assessments:  Activity Tolerance  Endurance: Tolerates 30 min exercise with multiple rests    Sensation  Light Touch: No apparent deficits  Sensation Comment: Pt denies N/T    Strength  Strength Comments: Pt demonstrates decreased LLE global strength  Perception  Inattention/Neglect: Appears intact    Coordination  Movements are Fluid and Coordinated: Yes    Postural Control  Postural Control: Within Functional Limits    Static Sitting Balance  Static Sitting-Balance Support: Feet supported, No upper extremity supported  Static Sitting-Level of Assistance: Close supervision  Static Sitting-Comment/Number of Minutes: Pt with dizziness seated EOB.  Dynamic Sitting Balance  Dynamic Sitting-Balance Support: Bilateral upper extremity supported, Feet supported  Dynamic Sitting-Level of Assistance: Close supervision  Dynamic Sitting-Balance: Trunk control activities, Lateral lean, Forward lean  Dynamic Sitting-Comments: Pt with dizziness seated  EOB.    Static Standing Balance  Static Standing-Balance Support: Bilateral upper extremity supported  Static Standing-Level of Assistance: Contact guard  Static Standing-Comment/Number of Minutes: Pt with dizziness in standing. Posteriorly shifted COM and reliance on back of LEs against bed.  Dynamic Standing Balance  Dynamic Standing-Balance Support: Bilateral upper extremity supported  Dynamic Standing-Level of Assistance: Contact guard, Close supervision  Dynamic Standing-Balance: Turning (ambulation) Posteriorly shifted COM and reliance on back of LEs against bed.     Functional Assessments:  Bed Mobility  Bed Mobility: Yes  Bed Mobility 1  Bed Mobility 1: Supine to sitting  Level of Assistance 1: Contact guard, Minimal verbal cues  Bed Mobility Comments 1: CGA to guide LLE movement. Elevated HOB, as pt sleeps in recliner at home.  Bed Mobility 2  Bed Mobility  2: Scooting  Level of Assistance 2: Close supervision  Bed Mobility Comments 2: Pt required cueing to scoot to EOB to get feet on floor and increase stability. Pt able to perform without hands on assistance.  Bed Mobility 3  Bed Mobility 3: Sitting to supine  Level of Assistance 3: Minimum assistance, Minimal verbal cues  Bed Mobility Comments 3: Min A to assist with lifting of LLE. Elevated HOB, as pt sleeps in recliner at home.  Extremity/Trunk Assessments:  RLE   RLE : Within Functional Limits  Strength RLE  RLE Overall Strength: Greater than or equal to 3/5 as evidenced by functional mobility  LLE   LLE : Exceptions to WFL  PROM LLE (degrees)  LLE PROM Comment: Decreased L hip PROM grossly, unable to fully assess d/t posterior hip precautions.  Strength LLE  LLE Overall Strength: Greater than or equal to 3/5 as evidenced by functional mobility  Treatments:  Therapeutic Exercise  Therapeutic Exercise Performed: Yes  Therapeutic Exercise Activity 1: Pt performed post-posterior NOEMI exercise program consisting of supine ankle pumps, quad sets, glute  sets, heel slides, SAQ, and hip abduction,  and seated LAQ. Pt performed x10 reps of each exercise on the LLE, with cueing for speed and form. Pt educated in performance of 10-20 reps of each exercise 3x/day.    Therapeutic Activity  Therapeutic Activity Performed: Yes  Therapeutic Activity 1: Pt educated on posterior hip precuations, WBAT, performance of HEP, pain management strategies, getting up once per hour once home to prevent stiffness and pain, and safe performance of functional mobility. Pt also educated on orthostasis and current safety concerns d/t lightheadedness/dizziness. Posterior hip handout provided to pt.     Transfers  Transfer: Yes  Transfer 1  Technique 1: Sit to stand, Stand to sit  Transfer Device 1: Walker, Gait belt  Transfer Level of Assistance 1: Contact guard, Close supervision  Trials/Comments 1: Pt performed x1 STS this session with CGA, 2WW, and increased time and effort. Pt required cueing for UE placement on walker and increasing weight shift to RLE due to LLE pain and weakness. Pt with increased dizziness in standing, leading to termination of session. RN aware.     Ambulation/Gait Training  Ambulation/Gait Training Performed: No    Stairs  Stairs: No  Outcome Measures:  [facility] Basic Mobility  Turning from your back to your side while in a flat bed without using bedrails: A little  Moving from lying on your back to sitting on the side of a flat bed without using bedrails: A little  Moving to and from bed to chair (including a wheelchair): A little  Standing up from a chair using your arms (e.g. wheelchair or bedside chair): A little  To walk in hospital room: Total  Climbing 3-5 steps with railing: Total  Basic Mobility - Total Score: 14    Encounter Problems       Encounter Problems (Active)       Balance       STG - Maintains dynamic standing balance Mod I with 2WW, unilateral upper extremity support, and equal WBing between LEs x8 minutes in order to perform ADLs and other  functional standing tasks.       Start:  08/04/25    Expected End:  08/08/25       INTERVENTIONS:  1. Practice standing with minimal support.  2. Educate patient about standing tolerance.  3. Educate patient about independence with gait, transfers, and ADL's.  4. Educate patient about use of assistive device.  5. Educate patient about self-directed care.            Mobility       STG - Patient will ambulate x200' Mod I with 2WW.       Start:  08/04/25    Expected End:  08/08/25            Pt will perform supine and seated TE program x 10-20 reps per exercise, 3x/day in order to attain stated functional goals         Start:  08/04/25    Expected End:  08/08/25               PT Transfers       STG - Transfer from bed to chair Mod I with 2WW.       Start:  08/04/25    Expected End:  08/08/25            STG - Patient to transfer to and from sit to supine Mod I.       Start:  08/04/25    Expected End:  08/08/25            STG - Patient will transfer sit to and from stand Mod I with 2WW.       Start:  08/04/25    Expected End:  08/08/25               Safety       Pt will follow posterior hip precautions during all functional mobility, 100% of the time, without cueing.         Start:  08/04/25    Expected End:  08/08/25                   Education Documentation  Handouts, taught by LENCHO James at 8/4/2025  3:50 PM.  Learner: Patient  Readiness: Acceptance  Method: Explanation  Response: Verbalizes Understanding  Comment: See above    Precautions, taught by LENCHO James at 8/4/2025  3:50 PM.  Learner: Patient  Readiness: Acceptance  Method: Explanation  Response: Verbalizes Understanding  Comment: See above    Body Mechanics, taught by LENCHO James at 8/4/2025  3:50 PM.  Learner: Patient  Readiness: Acceptance  Method: Explanation  Response: Verbalizes Understanding  Comment: See above    Home Exercise Program, taught by LENCHO James at 8/4/2025  3:50 PM.  Learner: Patient  Readiness:  Acceptance  Method: Explanation  Response: Verbalizes Understanding  Comment: See above    Mobility Training, taught by LENCHO James at 8/4/2025  3:50 PM.  Learner: Patient  Readiness: Acceptance  Method: Explanation  Response: Verbalizes Understanding  Comment: See above    Education Comments  No comments found.           [1]   Past Medical History:  Diagnosis Date    Adverse effect of anesthesia     has experienced both hypotension and hypertension post op    Arthritis     Back pain with sciatica     left>right    Cancer (Multi) polycythemia vera    Recs in Heme note from Ajith Estrada MD at 7/2/2025 10:20 AM, asymptomatic on Low dose ASA and Hydroxurea    Carpal tunnel syndrome     left wrist-improved with brace    Cervical spinal stenosis     Constipation     Diverticulosis     Dry mouth     Erythrocytosis     Essential (primary) hypertension 10/26/2022    Hypertension    GERD (gastroesophageal reflux disease)     resolved after she stopped night time eating, no longer on meds    History of echocardiogram 08/05/2016    HTN (hypertension)     Hypothyroidism     Internal hemorrhoids     no blood in stool for years since taking daily metamucil    GIRISH-2 gene mutation     Left hip pain     Migraines     Neck pain     OAB (overactive bladder)     Osteopenia     Osteoporosis     PONV (postoperative nausea and vomiting)     Pure hypercholesterolemia, unspecified 08/12/2014    High cholesterol    Thrombocytosis     Tinnitus     buzzing in ears   [2]   Past Surgical History:  Procedure Laterality Date    COLONOSCOPY      KNEE ARTHROSCOPY W/ DEBRIDEMENT  12/10/2013    Arthroscopy Knee Left    OOPHORECTOMY  08/12/2014    Oophorectomy - Unilateral (Removal Of One Ovary)    TONSILLECTOMY  03/08/2022    Tonsillectomy    TOTAL HIP ARTHROPLASTY Right     TOTAL SHOULDER ARTHROPLASTY Left     WISDOM TOOTH EXTRACTION      WRIST SURGERY Left 01/30/2017    Wrist Surgery-fracture repair 2019

## 2025-08-04 NOTE — HH CARE COORDINATION
Home Care received a Referral for Physical Therapy. We have processed the referral for a Start of Care within 48 hours of 8/5/25.     If you have any questions or concerns, please feel free to contact us at [phone]. Follow the prompts, enter your five digit zip code, and you will be directed to your care team on [facility].

## 2025-08-04 NOTE — PROGRESS NOTES
Physical Therapy    Physical Therapy Treatment    Patient Name: Ileana Glover  MRN: [id number]  Department: [facility] PACU  [place] [facility] [place]  Today's Date: 8/4/2025  Time Calculation  Start Time: 1509  Stop Time: 1535  Time Calculation (min): 26 min     Completion of this session, clinical decision making, and documentation performed under the supervision/direction of Maame Bailon DPT.     Assessment/Plan   PT Assessment  PT Assessment Results: Decreased strength, Decreased range of motion, Decreased endurance, Impaired balance, Decreased mobility, Decreased skin integrity, Orthopedic restrictions, Pain  Rehab Prognosis: Excellent  Barriers to Discharge Home: No anticipated barriers  Evaluation/Treatment Tolerance: Patient tolerated treatment well  Medical Staff Made Aware: Yes  Strengths: Ability to acquire knowledge, Access to adaptive/assistive products, Attitude of self, Insight into problems, Premorbid level of function, Rehab experience, Support of extended family/friends  Barriers to Participation: Comorbidities  End of Session Communication: Bedside nurse  Assessment Comment: Pt with improvement of symptoms this session, reporting no dizziness/lightheadedness this session with functional mobility. Pt continues to be limited by decreased balance, L hip pain, and LLE weakness. Pt amble to ambulate x50' x2 this session with CGA-SBA and 2WW. The patient would benefit from continued PT to assess progress and further therapy needs, address the above functional limitations and impairments to improve independence and safety to allow for anticipated d/c to home.  End of Session Patient Position: Up in chair, Alarm off, not on at start of session  PT Plan  Inpatient/Swing Bed or Outpatient: Inpatient  PT Plan  Treatment/Interventions: Bed mobility, Transfer training, Gait training, Stair training, Balance training, Neuromuscular re-education, Strengthening, Endurance training, Range of motion, Therapeutic  exercise, Therapeutic activity, Home exercise program  PT Plan: Ongoing PT  PT Frequency: BID  PT Discharge Recommendations: Low intensity level of continued care  Equipment Recommended upon Discharge: Other (comment) (Pt has 2WW at home)  PT Recommended Transfer Status: Assist x1, Assistive device (2WW)  PT - OK to Discharge: Yes    PT Visit Info:  PT Received On: 08/04/25     General Visit Information:   General  Reason for Referral: Pt is a 75 y.o. female POD #0 s/p L NOEMI with posterior approach.  Referred By: Maurizio Nelson MD  Past Medical History Relevant to Rehab: Medical History[1] Surgical History[2]   Family/Caregiver Present: No  Prior to Session Communication: Bedside nurse  Patient Position Received: On cart, Alarm off, not on at start of session  Preferred Learning Style: auditory, kinesthetic, verbal, visual  General Comment: Pt pleasant and agreeable to PT session. Pt reports that she is feeling much better after her nbap and fluids as compared to previous session today.    Subjective   Precautions:  Precautions  LE Weight Bearing Status: Weight Bearing as Tolerated (LLE)  Medical Precautions: Fall precautions  Post-Surgical Precautions: Left hip precautions (posterior)    Vital Signs Comment: BP monitored throughout session d/t hx of orthostasis after surgery. Pt BP in supine 136/76 (MAP 94), seated /86 (), and standing 144/96 (). Pt reporting no onset of dizziness this session.     Objective   Pain:  Pain Assessment  Pain Assessment: 0-10  0-10 (Numeric) Pain Score: 0 - No pain  Cognition:  Cognition  Overall Cognitive Status: Within Functional Limits  Orientation Level: Oriented X4  Coordination:  Movements are Fluid and Coordinated: Yes  Postural Control:  Postural Control  Postural Control: Within Functional Limits    Dynamic Standing Balance  Dynamic Standing-Balance Support: Bilateral upper extremity supported  Dynamic Standing-Level of Assistance: Contact guard, Close  supervision  Dynamic Standing-Balance: Turning (ambulation)    Activity Tolerance:  Activity Tolerance  Endurance: Tolerates 30 min exercise with multiple rests  Treatments:  Therapeutic Activity  Therapeutic Activity Performed: Yes  Therapeutic Activity 1: Pt educated on posterior hip precuations, WBAT, performance of HEP, pain management strategies, getting up once per hour once home to prevent stiffness and pain, and safe performance of functional mobility. Pt educated on safe performance of functional mobility including step-to pattern, 2WW management, and breathing.    Bed Mobility  Bed Mobility: Yes  Bed Mobility 1  Bed Mobility 1: Supine to sitting  Level of Assistance 1: Contact guard, Minimal verbal cues  Bed Mobility Comments 1: CGA to guide LLE movement. Elevated HOB, as pt sleeps in recliner at home.  Bed Mobility 2  Bed Mobility  2: Scooting  Level of Assistance 2: Close supervision  Bed Mobility Comments 2: Pt required cueing to scoot to EOB to get feet on floor and increase stability. Pt able to perform without hands on assistance.  Bed Mobility 3  Bed Mobility 3: Sitting to supine  Level of Assistance 3: Minimum assistance, Minimal verbal cues  Bed Mobility Comments 3: Min A to assist with lifting of LLE. Elevated HOB, as pt sleeps in recliner at home.    Ambulation/Gait Training  Ambulation/Gait Training Performed: Yes  Ambulation/Gait Training 1  Surface 1: Level tile  Device 1: Rolling walker  Gait Support Devices: Gait belt  Assistance 1: Contact guard, Close supervision, Minimal verbal cues  Quality of Gait 1: Wide base of support, Diminished heel strike, Inconsistent stride length, Decreased step length, Antalgic (decreased foot clearance, decreased speed)  Comments/Distance (ft) 1: Pt ambulated x50' x2 this session with SBA-CGA, 2WW, and increased time and effort. Pt required cued for 2WW management, step-to ambulation pattern, and safety. Originally provided CGA d/t hx of orthostasis, but  able to be faded to SBA d/t pt stability.  Transfers  Transfer: Yes  Transfer 1  Technique 1: Sit to stand, Stand to sit  Transfer Device 1: Walker, Gait belt  Transfer Level of Assistance 1: Contact guard, Close supervision  Trials/Comments 1: Pt performed x1 STS this session with CGA, 2WW, and increased time and effort. Pt required cueing for UE placement on walker and increasing weight shift to RLE due to LLE pain and weakness.  Transfers 2  Transfer From 2: Toilet to  Transfer to 2: Stand  Technique 2: Sit to stand  Transfer Device 2: Walker, Gait belt  Transfer Level of Assistance 2: Maximum assistance, Moderate verbal cues  Trials/Comments 2: Pt required Max A for STS from toilet this session d/t lower seat height. Pt with increased difficulty d/t decreased strangth and control of LLE. Cueing for use of grab bars, safety awareness, and sequencing. Pt reports having raised toilet seats and BSC at home. Pt educated on the current need for increased assistance from lower seats.    Stairs  Stairs: No (Pt does not have stairs at home)    Outcome Measures:  [facility] Basic Mobility  Turning from your back to your side while in a flat bed without using bedrails: A little  Moving from lying on your back to sitting on the side of a flat bed without using bedrails: A little  Moving to and from bed to chair (including a wheelchair): A little  Standing up from a chair using your arms (e.g. wheelchair or bedside chair): A little  To walk in hospital room: A little  Climbing 3-5 steps with railing: A little  Basic Mobility - Total Score: 18    Education Documentation  Handouts, taught by LENCHO James at 8/4/2025  4:21 PM.  Learner: Patient  Readiness: Acceptance  Method: Explanation  Response: Verbalizes Understanding  Comment: See above    Precautions, taught by LENCHO James at 8/4/2025  4:21 PM.  Learner: Patient  Readiness: Acceptance  Method: Explanation  Response: Verbalizes Understanding  Comment: See  above    Body Mechanics, taught by LENCHO James at 8/4/2025  4:21 PM.  Learner: Patient  Readiness: Acceptance  Method: Explanation  Response: Verbalizes Understanding  Comment: See above    Home Exercise Program, taught by LENCHO James at 8/4/2025  4:21 PM.  Learner: Patient  Readiness: Acceptance  Method: Explanation  Response: Verbalizes Understanding  Comment: See above    Mobility Training, taught by LENCHO James at 8/4/2025  4:21 PM.  Learner: Patient  Readiness: Acceptance  Method: Explanation  Response: Verbalizes Understanding  Comment: See above    Handouts, taught by LENCHO James at 8/4/2025  3:50 PM.  Learner: Patient  Readiness: Acceptance  Method: Explanation  Response: Verbalizes Understanding  Comment: See above    Precautions, taught by LENCHO James at 8/4/2025  3:50 PM.  Learner: Patient  Readiness: Acceptance  Method: Explanation  Response: Verbalizes Understanding  Comment: See above    Body Mechanics, taught by LENCHO James at 8/4/2025  3:50 PM.  Learner: Patient  Readiness: Acceptance  Method: Explanation  Response: Verbalizes Understanding  Comment: See above    Home Exercise Program, taught by LENCHO James at 8/4/2025  3:50 PM.  Learner: Patient  Readiness: Acceptance  Method: Explanation  Response: Verbalizes Understanding  Comment: See above    Mobility Training, taught by LENCHO James at 8/4/2025  3:50 PM.  Learner: Patient  Readiness: Acceptance  Method: Explanation  Response: Verbalizes Understanding  Comment: See above    Education Comments  No comments found.      Encounter Problems       Encounter Problems (Active)       Balance       STG - Maintains dynamic standing balance Mod I with 2WW, unilateral upper extremity support, and equal WBing between LEs x8 minutes in order to perform ADLs and other functional standing tasks. (Progressing)       Start:  08/04/25    Expected End:  08/08/25       INTERVENTIONS:  1. Practice standing with  minimal support.  2. Educate patient about standing tolerance.  3. Educate patient about independence with gait, transfers, and ADL's.  4. Educate patient about use of assistive device.  5. Educate patient about self-directed care.            Mobility       STG - Patient will ambulate x200' Mod I with 2WW. (Progressing)       Start:  08/04/25    Expected End:  08/08/25            Pt will perform supine and seated TE program x 10-20 reps per exercise, 3x/day in order to attain stated functional goals   (Progressing)       Start:  08/04/25    Expected End:  08/08/25               PT Transfers       STG - Transfer from bed to chair Mod I with 2WW. (Progressing)       Start:  08/04/25    Expected End:  08/08/25            STG - Patient to transfer to and from sit to supine Mod I. (Progressing)       Start:  08/04/25    Expected End:  08/08/25            STG - Patient will transfer sit to and from stand Mod I with 2WW. (Progressing)       Start:  08/04/25    Expected End:  08/08/25               Safety       Pt will follow posterior hip precautions during all functional mobility, 100% of the time, without cueing.   (Progressing)       Start:  08/04/25    Expected End:  08/08/25                        [1]   Past Medical History:  Diagnosis Date    Adverse effect of anesthesia     has experienced both hypotension and hypertension post op    Arthritis     Back pain with sciatica     left>right    Cancer (Multi) polycythemia vera    Recs in Heme note from Ajith Estrada MD at 7/2/2025 10:20 AM, asymptomatic on Low dose ASA and Hydroxurea    Carpal tunnel syndrome     left wrist-improved with brace    Cervical spinal stenosis     Constipation     Diverticulosis     Dry mouth     Erythrocytosis     Essential (primary) hypertension 10/26/2022    Hypertension    GERD (gastroesophageal reflux disease)     resolved after she stopped night time eating, no longer on meds    History of echocardiogram 08/05/2016    HTN (hypertension)      Hypothyroidism     Internal hemorrhoids     no blood in stool for years since taking daily metamucil    GIRISH-2 gene mutation     Left hip pain     Migraines     Neck pain     OAB (overactive bladder)     Osteopenia     Osteoporosis     PONV (postoperative nausea and vomiting)     Pure hypercholesterolemia, unspecified 08/12/2014    High cholesterol    Thrombocytosis     Tinnitus     buzzing in ears   [2]   Past Surgical History:  Procedure Laterality Date    COLONOSCOPY      KNEE ARTHROSCOPY W/ DEBRIDEMENT  12/10/2013    Arthroscopy Knee Left    OOPHORECTOMY  08/12/2014    Oophorectomy - Unilateral (Removal Of One Ovary)    TONSILLECTOMY  03/08/2022    Tonsillectomy    TOTAL HIP ARTHROPLASTY Right     TOTAL SHOULDER ARTHROPLASTY Left     WISDOM TOOTH EXTRACTION      WRIST SURGERY Left 01/30/2017    Wrist Surgery-fracture repair 2019

## 2025-08-04 NOTE — ANESTHESIA PROCEDURE NOTES
Spinal Block    Patient location during procedure: OR  Start time: 8/4/2025 8:13 AM  End time: 8/4/2025 8:23 AM  Reason for block: primary anesthetic, at surgeon's request and post-op pain management  Staffing  Performed: fellow   Authorized by: Cameron Montanez MD    Performed by: Clinton Orozco MD    Preanesthetic Checklist  Completed: patient identified, IV checked, risks and benefits discussed, surgical consent, monitors and equipment checked, pre-op evaluation, timeout performed and sterile techniques followed  Block Timeout  RN/Licensed healthcare professional reads aloud to the Anesthesia provider and entire team: Patient identity, procedure with side and site, patient position, and as applicable the availability of implants/special equipment/special requirements.    Timeout performed at:   Spinal Block  Patient position: sitting  Prep: ChloraPrep  Sterility prep: cap, drape, gloves and mask  Sedation level: no sedation  Patient monitoring: blood pressure, continuous pulse oximetry and heart rate  Approach: midline  Vertebral space: L3-4  Injection technique: single-shot  Needle  Needle type: pencil-point   Needle gauge: 24 G  Free flowing CSF: yes    Assessment  Sensory level: T6 bilateral  Block outcome: pain improved  Additional Notes  4mL of 1.5% mepivicaine injected intrathecally.

## 2025-08-04 NOTE — OP NOTE
Left Hip Total Arthroplasty ~ Posterior Approach (L) Operative Note     Date: 2025  OR Location: [facility] A OR    Name: Ileana Glover, : 1950, Age: 75 y.o., MRN: [id number], Sex: female    Diagnosis  Pre-op Diagnosis      * Arthritis of left hip [M16.12] Post-op Diagnosis     * Arthritis of left hip [M16.12]     Procedures  Left Hip Total Arthroplasty ~ Posterior Approach  89048 - NV ARTHRP ACETBLR/PROX FEM PROSTC AGRFT/ALGRFT      Surgeons      * Maurizio Nelson - Primary    Resident/Fellow/Other Assistant:  Surgeons and Role:     * Farhan Verdin MD - Resident - Assisting     * Jose D Farmer MD - Resident - Assisting    Staff:   Circulator: Yarelis Goyal Person: Joshua Goyal Person: Lance Lemus Circulator: Sky Lemus Scrub: Kye    Anesthesia Staff: Anesthesiologist: Cameron Montanez MD  CRNA: WEI Olmos-NIYAH  C-AA: SALENA Chappell    Procedure Summary  Anesthesia: Anesthesia type not filed in the log.  ASA: III  Estimated Blood Loss: 100mL  Intra-op Medications:   Administrations occurring from 0735 to 1030 on 25:   Medication Name Total Dose   sodium chloride 0.9 % irrigation solution 3,000 mL   sterile water irrigation solution 1,000 mL   ropivacaine-epinephrine-clonidine-ketorolac 2.46-0.005- 0.0008-0.3mg/mL periarticular syringe 50 mL   sodium chloride 0.9 % irrigation solution 1,000 mL   ceFAZolin (Ancef) vial 1 g 2 g   dexAMETHasone (Decadron) 4 mg/mL IV Syringe 2 mL 4 mg   ePHEDrine injection 50 mg   fentaNYL (Sublimaze) injection 50 mcg/mL 100 mcg   ketorolac (Toradol) injection 30 mg 30 mg   lactated Ringer's infusion Cannot be calculated   lidocaine PF (Xylocaine-MPF) local injection 2 % 60 mg   mepivacaine PF (Carbocaine) 1.5 % 4 mL   midazolam PF (Versed) injection 1 mg/mL 2 mg   ondansetron (Zofran) 2 mg/mL injection 4 mg   phenylephrine 100 mcg/mL syringe 10 mL (prefilled) 100 mcg   propofol (Diprivan) injection 10 mg/mL 1,100.08 mg   tranexamic acid  (Cyklokapron) injection 1,000 mg              Anesthesia Record               Intraprocedure I/O Totals          Intake    Tranexamic Acid 0.00 mL    The total shown is the total volume documented since Anesthesia Start was filed.    Total Intake 0 mL          Specimen: No specimens collected              Drains and/or Catheters: * None in log *    Tourniquet Times:         Implants:  Implants       Type Name Action Serial No.      Joint Hip EMPHASYS ACETABULAR SHELL 54MM THREE HOLE CEMENTLESS Implanted N/A     Screw SCREW CANCELLOUS 6.5 X 25 - SN/A - [id number] Implanted N/A     Joint Hip EMPHASYS POLY LINER ELEVATED RIM 54MM 36MM Implanted N/A     Screw SCREW CANCELLOUS 6.5 X 25 - SN/A - [id number] Implanted N/A     Joint STEM, FEMORAL, ACTIS COLLAR, STD, SIZE 9 - [id number] Implanted       36MM, -2 FEMORAL HEAD Implanted               Findings: djd    Indications: Ileana Glover is an 75 y.o. female who is having surgery for Arthritis of left hip [M16.12].     The patient was seen in the preoperative area. The risks, benefits, complications, treatment options, non-operative alternatives, expected recovery and outcomes were discussed with the patient. The possibilities of reaction to medication, pulmonary aspiration, injury to surrounding structures, bleeding, recurrent infection, the need for additional procedures, failure to diagnose a condition, and creating a complication requiring transfusion or operation were discussed with the patient. The patient concurred with the proposed plan, giving informed consent.  The site of surgery was properly noted/marked if necessary per policy. The patient has been actively warmed in preoperative area. Preoperative antibiotics have been ordered and given within 1 hours of incision. Venous thrombosis prophylaxis have been ordered including unilateral sequential compression device    Procedure Details: Preoperative huddle was performed with patient identification procedure and  site verification.  Patient given prophylactic antibiotics and TXA.  After placement of spinal anesthesia she was placed in the left lateral decubitus position.  Left hip and leg were then sterilely prepped draped usual fashion.  A standard posterior approach was made to the hip.  The short rotators and capsule were taken down 1 layer and teed proximally.  Hip was dislocated.  The femoral neck cut was made using neck cutting guide.  Deep acetabular tractors were then placed.  12 and R and labrum were excised.  After medialization of the #was concentrically reamed to 54 mm.  A size 54 DePuy emphasis cup was then placed had good interference fit.  2 screws were used for additional fixation.  The extended liner was placed in the 2 o'clock position.  Next attention was paid to the femur.  The femur was prepared in the standard fashion.  Ultimately a size 9 DePuy collared Actis stem was placed had good torsional stability after trials and interoperative x-ray a -2 mm neck length was selected with a 36 mm metal head this is a stable construct.  Leg lengths appeared equal.  Prior to closure the wound was vigorously irrigated.  Short rotators and capsule reattached to the trochanter through drill holes.  Skin was closing running subsequent suture.  Dermabond dressing was then placed.  Evidence of Infection: No   Complications:  None; patient tolerated the procedure well.    Disposition: PACU - hemodynamically stable.  Condition: stable                 Additional Details:     Attending Attestation: I was present for the entire procedure.    Maurizio Nelson  Phone Number: [phone]

## 2025-08-04 NOTE — DISCHARGE INSTRUCTIONS
Additional instructions  Ice/Elevate operative extremity.  Keep dressing clean and dry.    Keep dressing in place.  Weightbearing: WBAT on operative extremity with crutches/walker. Post op Shoe.  Oxycodone 1 by mouth every 6 hours as needed for pain.  Start Tylenol 650 mg by mouth every 6 hours as needed for pain.  Aspirin 81 mg by mouth twice daily.  F/U with Dr. Nelson in 2 weeks.  Call [phone] for appointment time.         Postoperative Instructions: Total Joint Replacement    POSTOPERATIVE MEDICATIONS  PAIN MEDICATION  Pain medications have been prescribed for post-operative pain control. Take in conjunction with ice/cold therapy to assist with swelling and pain. If prescribed multiple pain medications, be sure to alternate administration times throughout the day so that you can take something every few hours. Consider taking Tylenol in between narcotic administration times (keep in mind Percocet/Vicodin contain Tylenol and not to exceed Tylenol limit of 4grams in 24 hours). Prescription will generally be for 7 days at a time and refills will be sent upon request. For refills, request via [facility] or call surgeon's office during business hours and follow up with your pharmacy regarding status and pickup.    Side effects may be constipation and nausea, vomiting, sleepiness, dizziness, lightheadedness, headache, blurred vision, dry mouth, sweating, itching (if you have itching, over-the -counter Benadryl can be used as needed).  You may NOT operate a motor vehicle while taking narcotic pain medication.    BLOOD THINNER  Aspirin or another medication has been prescribed as a blood thinner to prevent blood clots in your leg or lungs. Take as prescribed on the bottle. You will not receive a refill on this medication.  Do not take this medication if you are on another blood thinner.  Do not take any anti-inflammatory medications such as Meloxicam, Celebrex, Ibuprofen, Motrin, Aleve, or Advil while using the  Aspirin or another blood thinner unless instructed otherwise by your surgeon.       STOOL SOFTENERS/LAXATIVES  Post-operative constipation can result due to a combination of inactivity, anesthesia and pain medication. To help prevent this, you should increase your water and fiber intake. Physical activity, such as walking, will also help stimulate the bowels. If you are not having regular bowel movements, increase your bowel regimen!  Consider constipation prevention and treatment medications if not prescribed by your surgeon. These are available over the counter at the drug store (The pharmacist on staff may also make recommendations):  Stool Softener: Colace  Laxative: Senna, Miralax, Milk of Magnesia, Magnesium Citrate    WOUND CARE  Pain and swelling are normal following surgery and can last for weeks to months depending on the patient.  To help relieve these symptoms, please follow the post-operative pain regimen as it has been prescribed, use ice often, wear compression stockings every day as prescribed, and elevate your leg every hour.   You have a waterproof bandage on your wound and may shower with this on. The waterproof bandage is to remain in place for a minimum of 6 -7 days. Home care will remove it. You may leave your incision open to air after the bandage has been removed. Once the dressing is removed, you may see steri strips, surgical mesh, or glue. Do not peel or cut any of these items, they will fall off on their own or your surgeon will address at your follow up appointment.   DO NOT soak your incision in a bath, hot tub, pool or pond/lake for a minimum of 8 weeks following your surgery.  DO NOT use lotions, creams, ointments on your wound for a minimum of 6 weeks following your surgery. At that time you may use vitamin E to assist with softening of your incision.    _X____ TOTAL HIP ARTHROPLASTY  After surgery, you will have a compression stocking on your operative leg. Continue to wear the  "compression stocking for 4 weeks to prevent blood clots in your leg or lungs. Remove compression stockings at night.  If there is continued drainage or bleeding, cover with an abdominal pad and tape.   posterior hip precautions:   Don't lean forward while you sit down or stand up, and don't bend past 90 degrees (like the angle in a letter \"L\"). This means you can't try to  something off the floor or bend down to tie your shoes. Don't lift your knee higher than your hip.  Don't sit on low chairs, beds, or toilets. You may want to use a raised toilet seat for a while. Sit in chairs with arms. Imagine there's a line running down the middle of your body. Keep your legs from crossing over it.  When you get into a car, back up to the seat of the car, and then sit and slide across the seat toward the middle of the car with your knees about 12 inches apart. A plastic bag on the seat can help you slide in and out of the car.  Don't cross your legs when you sit.  Don't cross your ankles while lying down.  It may help to keep a pillow between your knees when you're in bed.    _____ TOTAL KNEE ARTHROPLASTY  After surgery, you will have a compression stocking and ACE wrap on your operative leg. Continue to wear the compression stocking for 4 weeks to prevent blood clots in your leg or lungs. Remove compression stockings at night. ACE wrap can be removed on postop day 1 or removed by homecare therapist at their first visit. Can use ACE wrap intermittently for swelling.  Elevate your leg periodically to help with swelling. BE SURE to place the support under your ankle, NOT under your knee. You want your knee as straight as possible.    JOINT CARE TEAM  For nursing or wound care questions within the first 6 weeks after surgery, please contact the joint replacement nurse at the facility where you had surgery.  If you are leaving a message, please include your full name, date of birth and date of surgery so that we can " identify correctly identify you.  For messages left outside of normal business hours, your call will be returned on the next business day.  Please do not leave emergent messages outside of normal business hours that cannot wait until the next business day.  For orthopedic concerns longer than 6 weeks after your surgery, you will need to call the office to schedule an appointment to be seen.    [facility]:  Ghassan Oseguera RN, [phone] or Justyna Turner RN, [phone]        RESTARTING HOME MEDICATIONS/DIET  You may restart your home medications the following day after your surgery UNLESS you have been given alternate instructions.  Follow the instructions given to you on your hospital discharge instructions for more information regarding your home medications.  Resume your normal diet after surgery. If you are on a specific type of diet for your condition, resume that instead.  Choose foods that help promote good bowel habits and prevent constipation, such as foods high in fiber. Be sure to drink water to stay hydrated and to prevent constipation.       DENTAL PROCEDURES & CLEANINGS  All patients must wait a minimum of 3 months for elective dental appointments, including routine cleanings, as to prevent total joint replacement infections. Please refer to your surgeon as to whether you will need antibiotics for future dental appointments.     EMERGENCIES  When to contact our office immediately:  Fever >101.5 for at least 48 hours after surgery or chills.  Excessive bleeding from incision(s). A small amount of drainage is normal and expected.  Signs of infection of incision(s)-excessive drainage that is soaking through your dressing (especially if it is pus-like), redness that is spreading out from the edges of your incision, or increased warmth around the area.  Excruciating pain for which the pain medication, taken as instructed, is not helping.  Severe calf pain.  Go directly to the emergency room  or call 911, if you are experiencing chest pain, shortness of breath, or difficulty breathing.    IN-HOME PHYSICAL THERAPY & OUTPATIENT PHYSICAL THERAPY  In-home physical therapy will start 1-2 days after you get home from the hospital.  The home care agency will call you prior to their first visit.  Please refer to the contact information on your hospital discharge instructions if you need to contact them.  Make sure to provide a phone number with the ability for the home care staff to leave a message if you do not answer your phone.  Please continue to complete the assigned home exercises two times per day on the days that physical therapy is not scheduled to come to your home.    Following a total knee replacement, you should plan to transition from in-home therapy to outpatient therapy 2-3 weeks after surgery.  Patients should be making their first appointment several weeks in advance to avoid delays. You may use the physical therapy location of your choice; it is the patient's responsibility to make sure the location chosen is covered by insurance.  If you are having a hip replacement and need additional therapy, please contact the office for an order.    It is common to have a temporary increase in pain and swelling upon starting outpatient physical therapy and/or changing your exercise routine.  Continue to use ice to help with symptoms.    DRIVING & TRAVEL AFTER SURGERY   Patients should anticipate waiting at least 4-6 weeks before traveling long distances after surgery.  You will need to stop to walk around ever 1 hour during your travel to help with blood clot prevention.  Please call the office or your joint nurse to discuss prior to post-surgical travel.  Patients may not drive until cleared by the joint nurse or the office.    FOLLOW-UP APPOINTMENT  Please call your surgeon's office within 2 weeks following surgery to schedule a follow up visit.    ICE  You have been prescribed to ice your total joint  at a minimum of twice per hour for 20 minutes while awake during the first 6 weeks after surgery if you are using ice packs. This will help with pain control. Be sure to have a layer of protection between the ice pack and your skin. Ice packs should be rotated on for 20 minutes and off for 20 minutes to prevent frost bite.   If you are using an ice machine, please follow ice machine instructions and tips below.    [employer] Cold Therapy Machine Recommendations    Cold therapy devices can be used before and after surgery to assist in comfort and help to reduce pain and swelling.  These devices differ from ice or ice packs whereas the mechanism circulates water through tubing and a pad to provide longer periods of cold therapy to the desired site.  While in the hospital, you can use your cold devices around the clock for optimal comfort.  We recommend using cold therapy after working with therapy or completing exercises on your own.  Once you are discharged home, there is no set schedule in which you must follow while using cold therapy.  Below are a few points to remember when using a cold therapy device:    Read the 's instructions prior to first the use.  Follow instructions for filling the cooler (water first, then ice).  Always make sure there is a layer of protection between the cold pad and your skin (Clothing, Towel, Ace Bandage, etc.)  Allow the device to circulate cold water throughout the pad prior wrapping the pad around your leg (approximately 10 minutes).  Place the pad on your leg in the desired position to meet your pain management needs and use the wraps provided to secure the pad to your body.  The purpose of this device is to use consistently throughout the day.  You do not need to need to use the 20 on, 20 off method.  During waking hours, remove the cold pad every 1-2 hours to perform a skin check  Detach the pad from the cooler and ambulate at least once every hour  After removing  the pad, allow at least 30 minutes before resuming cold therapy  You may wear the cold therapy device during periods of sleep including overnight    If you wake up during the night, you can check the skin at this time.  You do not need to wake up specifically to perform skin checks.  Empty the cooler and pad when device is not in use.  Follow 's instructions for cleaning your cold therapy device.

## 2025-08-05 ENCOUNTER — HOME CARE VISIT (OUTPATIENT)
Dept: HOME HEALTH SERVICES | Facility: HOME HEALTH | Age: 75
End: 2025-08-05
Payer: MEDICARE

## 2025-08-05 PROCEDURE — 169592 NO-PAY CLAIM PROCEDURE

## 2025-08-05 PROCEDURE — G0151 HHCP-SERV OF PT,EA 15 MIN: HCPCS | Mod: HHH

## 2025-08-05 ASSESSMENT — GAIT ASSESSMENTS
INITIATION OF GAIT IMMEDIATELY AFTER GO: 1 - NO HESITANCY
STEP CONTINUITY: 0 - STOPPING OR DISCONTINUITY BETWEEN STEPS
TRUNK: 0 - MARKED SWAY OR USES WALKING AID
PATH: 1 - MILD/MODERATE DEVIATION OR USES WALKING AID
TRUNK SCORE: 0
WALKING STANCE: 0 - HEELS APART
STEP SYMMETRY: 0 - RIGHT AND LEFT STEP LENGTH NOT EQUAL
PATH SCORE: 1
BALANCE AND GAIT SCORE: 14
GAIT SCORE: 5

## 2025-08-05 ASSESSMENT — ACTIVITIES OF DAILY LIVING (ADL)
AMBULATION ASSISTANCE ON FLAT SURFACES: 1
OASIS_M1830: 03
AMBULATION_DISTANCE/DURATION_TOLERATED: 15 FT
ENTERING_EXITING_HOME: NEEDS ASSISTANCE

## 2025-08-05 ASSESSMENT — ENCOUNTER SYMPTOMS
LOWEST PAIN SEVERITY IN PAST 24 HOURS: 1/10
PAIN LOCATION - EXACERBATING FACTORS: ACTIVITY, AMBULATION
PERSON REPORTING PAIN: PATIENT
PAIN LOCATION - PAIN SEVERITY: 2/10
PAIN LOCATION - PAIN FREQUENCY: CONSTANT
AGGRESSION WITHIN DEFINED LIMITS: 1
HIGHEST PAIN SEVERITY IN PAST 24 HOURS: 5/10
PAIN: 1
SLEEP QUALITY: ADEQUATE
PAIN SEVERITY GOAL: 1/10
PAIN LOCATION - RELIEVING FACTORS: REST, ICE, MEDICATION
ANGER WITHIN DEFINED LIMITS: 1

## 2025-08-05 ASSESSMENT — BALANCE ASSESSMENTS
NUDGED: 2 - STEADY
EYES CLOSED AT MAXIMUM POSITION NUDGED: 0 - UNSTEADY
SITTING DOWN: 1 - USES ARMS OR NOT SMOOTH MOTION
ATTEMPTS TO ARISE: 2 - ABLE TO RISE, ONE ATTEMPT
ARISES: 1 - ABLE, USES ARMS TO HELP
ARISING SCORE: 1
BALANCE SCORE: 9
NUDGED SCORE: 2
SITTING BALANCE: 1 - STEADY, SAFE
IMMEDIATE STANDING BALANCE FIRST 5 SECONDS: 1 - STEADY BUT USES WALKER OR OTHER SUPPORT
STANDING BALANCE: 1 - STEADY BUT WIDE STANCE AND USES CANE OR OTHER SUPPORT
TURNING 360 DEGREES STEPS: 0 - DISCONTINUOUS STEPS

## 2025-08-07 ENCOUNTER — HOME CARE VISIT (OUTPATIENT)
Dept: HOME HEALTH SERVICES | Facility: HOME HEALTH | Age: 75
End: 2025-08-07
Payer: MEDICARE

## 2025-08-07 VITALS
DIASTOLIC BLOOD PRESSURE: 80 MMHG | SYSTOLIC BLOOD PRESSURE: 132 MMHG | RESPIRATION RATE: 16 BRPM | HEART RATE: 65 BPM | TEMPERATURE: 99.1 F

## 2025-08-07 PROCEDURE — G0151 HHCP-SERV OF PT,EA 15 MIN: HCPCS | Mod: HHH

## 2025-08-07 ASSESSMENT — ENCOUNTER SYMPTOMS
PAIN LOCATION - EXACERBATING FACTORS: ACTIVITY
PERSON REPORTING PAIN: PATIENT
PAIN SEVERITY GOAL: 2/10
HIGHEST PAIN SEVERITY IN PAST 24 HOURS: 5/10
PAIN LOCATION - PAIN SEVERITY: 2/10
PAIN: 1
PAIN LOCATION - RELIEVING FACTORS: REST, ICE, MEDICATION
LOWEST PAIN SEVERITY IN PAST 24 HOURS: 2/10

## 2025-08-11 ENCOUNTER — TELEPHONE (OUTPATIENT)
Dept: ORTHOPEDIC SURGERY | Facility: HOSPITAL | Age: 75
End: 2025-08-11
Payer: MEDICARE

## 2025-08-11 ENCOUNTER — HOME CARE VISIT (OUTPATIENT)
Dept: HOME HEALTH SERVICES | Facility: HOME HEALTH | Age: 75
End: 2025-08-11
Payer: MEDICARE

## 2025-08-12 ENCOUNTER — HOME CARE VISIT (OUTPATIENT)
Dept: HOME HEALTH SERVICES | Facility: HOME HEALTH | Age: 75
End: 2025-08-12
Payer: MEDICARE

## 2025-08-12 VITALS
HEART RATE: 61 BPM | DIASTOLIC BLOOD PRESSURE: 68 MMHG | TEMPERATURE: 97.6 F | RESPIRATION RATE: 16 BRPM | SYSTOLIC BLOOD PRESSURE: 118 MMHG

## 2025-08-12 PROCEDURE — G0151 HHCP-SERV OF PT,EA 15 MIN: HCPCS | Mod: HHH

## 2025-08-12 ASSESSMENT — ENCOUNTER SYMPTOMS
PAIN LOCATION - PAIN SEVERITY: 2/10
HIGHEST PAIN SEVERITY IN PAST 24 HOURS: 2/10
LOWEST PAIN SEVERITY IN PAST 24 HOURS: 1/10
PERSON REPORTING PAIN: PATIENT
PAIN LOCATION - RELIEVING FACTORS: REST, MEDICATION, ICE
PAIN: 1
PAIN LOCATION - EXACERBATING FACTORS: ACTIVITY
PAIN SEVERITY GOAL: 1/10

## 2025-08-14 ENCOUNTER — HOME CARE VISIT (OUTPATIENT)
Dept: HOME HEALTH SERVICES | Facility: HOME HEALTH | Age: 75
End: 2025-08-14
Payer: MEDICARE

## 2025-08-14 PROCEDURE — G0151 HHCP-SERV OF PT,EA 15 MIN: HCPCS | Mod: HHH

## 2025-08-15 VITALS
TEMPERATURE: 97.3 F | RESPIRATION RATE: 16 BRPM | DIASTOLIC BLOOD PRESSURE: 74 MMHG | HEART RATE: 55 BPM | SYSTOLIC BLOOD PRESSURE: 118 MMHG

## 2025-08-15 ASSESSMENT — ENCOUNTER SYMPTOMS
LOWEST PAIN SEVERITY IN PAST 24 HOURS: 2/10
PERSON REPORTING PAIN: PATIENT
PAIN LOCATION - PAIN SEVERITY: 3/10
PAIN SEVERITY GOAL: 1/10
HIGHEST PAIN SEVERITY IN PAST 24 HOURS: 3/10
PAIN: 1
PAIN LOCATION - RELIEVING FACTORS: REST, ICE, MEDICATION
PAIN LOCATION - EXACERBATING FACTORS: ACTIVITY

## 2025-08-19 ENCOUNTER — HOME CARE VISIT (OUTPATIENT)
Dept: HOME HEALTH SERVICES | Facility: HOME HEALTH | Age: 75
End: 2025-08-19
Payer: MEDICARE

## 2025-08-19 VITALS
SYSTOLIC BLOOD PRESSURE: 114 MMHG | DIASTOLIC BLOOD PRESSURE: 70 MMHG | RESPIRATION RATE: 16 BRPM | TEMPERATURE: 98.9 F | HEART RATE: 62 BPM

## 2025-08-19 PROCEDURE — G0151 HHCP-SERV OF PT,EA 15 MIN: HCPCS | Mod: HHH

## 2025-08-19 ASSESSMENT — ENCOUNTER SYMPTOMS
PAIN SEVERITY GOAL: 2/10
PAIN LOCATION - RELIEVING FACTORS: REST, ICE, MEDICATION
LOWEST PAIN SEVERITY IN PAST 24 HOURS: 1/10
PAIN LOCATION - PAIN SEVERITY: 1/10
PAIN: 1
PERSON REPORTING PAIN: PATIENT
HIGHEST PAIN SEVERITY IN PAST 24 HOURS: 2/10
PAIN LOCATION - PAIN QUALITY: SORENESS

## 2025-08-21 ENCOUNTER — APPOINTMENT (OUTPATIENT)
Dept: ORTHOPEDIC SURGERY | Facility: CLINIC | Age: 75
End: 2025-08-21
Payer: MEDICARE

## 2025-08-21 ENCOUNTER — HOME CARE VISIT (OUTPATIENT)
Dept: HOME HEALTH SERVICES | Facility: HOME HEALTH | Age: 75
End: 2025-08-21
Payer: MEDICARE

## 2025-08-21 ENCOUNTER — HOSPITAL ENCOUNTER (OUTPATIENT)
Dept: RADIOLOGY | Facility: CLINIC | Age: 75
Discharge: HOME | End: 2025-08-21
Payer: MEDICARE

## 2025-08-21 VITALS
HEART RATE: 59 BPM | SYSTOLIC BLOOD PRESSURE: 122 MMHG | TEMPERATURE: 98.4 F | DIASTOLIC BLOOD PRESSURE: 76 MMHG | RESPIRATION RATE: 16 BRPM

## 2025-08-21 DIAGNOSIS — Z96.642 S/P HIP REPLACEMENT, LEFT: ICD-10-CM

## 2025-08-21 PROCEDURE — 1157F ADVNC CARE PLAN IN RCRD: CPT | Performed by: ORTHOPAEDIC SURGERY

## 2025-08-21 PROCEDURE — G0151 HHCP-SERV OF PT,EA 15 MIN: HCPCS | Mod: HHH

## 2025-08-21 PROCEDURE — 73502 X-RAY EXAM HIP UNI 2-3 VIEWS: CPT | Mod: LT

## 2025-08-21 PROCEDURE — 99024 POSTOP FOLLOW-UP VISIT: CPT | Performed by: ORTHOPAEDIC SURGERY

## 2025-08-21 PROCEDURE — 1159F MED LIST DOCD IN RCRD: CPT | Performed by: ORTHOPAEDIC SURGERY

## 2025-08-21 ASSESSMENT — ENCOUNTER SYMPTOMS
PAIN LOCATION - EXACERBATING FACTORS: ACTIVITY
PAIN LOCATION - RELIEVING FACTORS: REST, ICE, ELEVATION
HIGHEST PAIN SEVERITY IN PAST 24 HOURS: 4/10
PAIN SEVERITY GOAL: 2/10
PERSON REPORTING PAIN: PATIENT
LOWEST PAIN SEVERITY IN PAST 24 HOURS: 2/10
PAIN: 1
PAIN LOCATION - PAIN SEVERITY: 2/10

## 2025-08-23 DIAGNOSIS — I10 PRIMARY HYPERTENSION: ICD-10-CM

## 2025-08-25 ENCOUNTER — APPOINTMENT (OUTPATIENT)
Dept: ENDOCRINOLOGY | Facility: CLINIC | Age: 75
End: 2025-08-25
Payer: MEDICARE

## 2025-08-25 VITALS — BODY MASS INDEX: 37.87 KG/M2 | WEIGHT: 255.7 LBS | HEIGHT: 69 IN

## 2025-08-25 DIAGNOSIS — E66.9 OBESITY WITH BODY MASS INDEX 30 OR GREATER: Primary | ICD-10-CM

## 2025-08-25 DIAGNOSIS — Z71.3 DIETARY COUNSELING: ICD-10-CM

## 2025-08-25 RX ORDER — OLMESARTAN MEDOXOMIL 20 MG/1
20 TABLET ORAL DAILY
Qty: 90 TABLET | Refills: 0 | Status: SHIPPED | OUTPATIENT
Start: 2025-08-25

## 2025-08-26 ENCOUNTER — HOME CARE VISIT (OUTPATIENT)
Dept: HOME HEALTH SERVICES | Facility: HOME HEALTH | Age: 75
End: 2025-08-26
Payer: MEDICARE

## 2025-08-26 VITALS
SYSTOLIC BLOOD PRESSURE: 120 MMHG | DIASTOLIC BLOOD PRESSURE: 70 MMHG | TEMPERATURE: 98.2 F | RESPIRATION RATE: 16 BRPM | HEART RATE: 60 BPM

## 2025-08-26 PROCEDURE — G0151 HHCP-SERV OF PT,EA 15 MIN: HCPCS | Mod: HHH

## 2025-08-26 ASSESSMENT — ENCOUNTER SYMPTOMS
PERSON REPORTING PAIN: PATIENT
PAIN LOCATION - PAIN SEVERITY: 2/10
PAIN SEVERITY GOAL: 1/10
PAIN: 1
LOWEST PAIN SEVERITY IN PAST 24 HOURS: 2/10
HIGHEST PAIN SEVERITY IN PAST 24 HOURS: 3/10
PAIN LOCATION - EXACERBATING FACTORS: ACTIVITY
PAIN LOCATION - RELIEVING FACTORS: REST, ICE, ELEVATION

## 2025-08-28 ENCOUNTER — HOME CARE VISIT (OUTPATIENT)
Dept: HOME HEALTH SERVICES | Facility: HOME HEALTH | Age: 75
End: 2025-08-28
Payer: MEDICARE

## 2025-08-28 VITALS
TEMPERATURE: 98.4 F | SYSTOLIC BLOOD PRESSURE: 120 MMHG | RESPIRATION RATE: 16 BRPM | DIASTOLIC BLOOD PRESSURE: 70 MMHG | HEART RATE: 59 BPM

## 2025-08-28 PROCEDURE — G0151 HHCP-SERV OF PT,EA 15 MIN: HCPCS | Mod: HHH

## 2025-08-28 ASSESSMENT — ENCOUNTER SYMPTOMS
PAIN LOCATION - EXACERBATING FACTORS: ACTIVITY
PAIN: 1
PAIN SEVERITY GOAL: 1/10
PERSON REPORTING PAIN: PATIENT
PAIN LOCATION - PAIN SEVERITY: 1/10
HIGHEST PAIN SEVERITY IN PAST 24 HOURS: 1/10
PAIN LOCATION - RELIEVING FACTORS: REST, ICE, MEDICATION
LOWEST PAIN SEVERITY IN PAST 24 HOURS: 1/10

## 2025-08-29 ENCOUNTER — OFFICE VISIT (OUTPATIENT)
Dept: ORTHOPEDIC SURGERY | Facility: CLINIC | Age: 75
End: 2025-08-29
Payer: MEDICARE

## 2025-08-29 DIAGNOSIS — Z96.642 S/P HIP REPLACEMENT, LEFT: ICD-10-CM

## 2025-08-29 PROCEDURE — 1036F TOBACCO NON-USER: CPT | Performed by: ORTHOPAEDIC SURGERY

## 2025-08-29 PROCEDURE — 99024 POSTOP FOLLOW-UP VISIT: CPT | Performed by: ORTHOPAEDIC SURGERY

## 2025-08-29 PROCEDURE — 1159F MED LIST DOCD IN RCRD: CPT | Performed by: ORTHOPAEDIC SURGERY

## 2025-08-29 RX ORDER — CEFADROXIL 500 MG/1
500 CAPSULE ORAL 2 TIMES DAILY
Qty: 60 CAPSULE | Refills: 0 | Status: SHIPPED | OUTPATIENT
Start: 2025-08-29

## 2025-09-02 ENCOUNTER — APPOINTMENT (OUTPATIENT)
Dept: ENDOCRINOLOGY | Facility: CLINIC | Age: 75
End: 2025-09-02
Payer: MEDICARE

## 2025-09-02 ENCOUNTER — HOME CARE VISIT (OUTPATIENT)
Dept: HOME HEALTH SERVICES | Facility: HOME HEALTH | Age: 75
End: 2025-09-02
Payer: MEDICARE

## 2025-09-02 PROCEDURE — G0151 HHCP-SERV OF PT,EA 15 MIN: HCPCS | Mod: HHH

## 2025-09-03 VITALS
SYSTOLIC BLOOD PRESSURE: 122 MMHG | HEART RATE: 61 BPM | TEMPERATURE: 98.2 F | RESPIRATION RATE: 16 BRPM | DIASTOLIC BLOOD PRESSURE: 82 MMHG

## 2025-09-03 ASSESSMENT — ENCOUNTER SYMPTOMS
PAIN LOCATION - PAIN SEVERITY: 1/10
PAIN: 1
HIGHEST PAIN SEVERITY IN PAST 24 HOURS: 1/10
PERSON REPORTING PAIN: PATIENT
PAIN LOCATION - EXACERBATING FACTORS: ACTIVITY
PAIN LOCATION - RELIEVING FACTORS: REST, ICE, MEDICATION
PAIN SEVERITY GOAL: 1/10
LOWEST PAIN SEVERITY IN PAST 24 HOURS: 1/10

## 2025-09-04 ENCOUNTER — HOME CARE VISIT (OUTPATIENT)
Dept: HOME HEALTH SERVICES | Facility: HOME HEALTH | Age: 75
End: 2025-09-04
Payer: MEDICARE

## 2025-09-04 VITALS
DIASTOLIC BLOOD PRESSURE: 76 MMHG | SYSTOLIC BLOOD PRESSURE: 128 MMHG | TEMPERATURE: 98.8 F | HEART RATE: 57 BPM | RESPIRATION RATE: 16 BRPM

## 2025-09-04 PROCEDURE — G0151 HHCP-SERV OF PT,EA 15 MIN: HCPCS | Mod: HHH

## 2025-09-04 ASSESSMENT — ENCOUNTER SYMPTOMS
HIGHEST PAIN SEVERITY IN PAST 24 HOURS: 1/10
PAIN SEVERITY GOAL: 1/10
PAIN: 1
PAIN LOCATION - PAIN SEVERITY: 1/10
PERSON REPORTING PAIN: PATIENT
LOWEST PAIN SEVERITY IN PAST 24 HOURS: 1/10
PAIN LOCATION - EXACERBATING FACTORS: ACTIVITY
PAIN LOCATION - RELIEVING FACTORS: REST, ICE, MEDICATION

## 2025-09-04 ASSESSMENT — GAIT ASSESSMENTS
BALANCE AND GAIT SCORE: 22
TRUNK SCORE: 2
WALKING STANCE: 0 - HEELS APART
STEP CONTINUITY: 0 - STOPPING OR DISCONTINUITY BETWEEN STEPS
TRUNK: 2 - NO SWAY, NO FLEXION, NO USE OF ARMS, NO WALKING AID
STEP SYMMETRY: 1 - RIGHT AND LEFT STEP LENGTH APPEAR EQUAL
GAIT SCORE: 9
PATH: 2 - STRAIGHT WITHOUT WALKING AID
PATH SCORE: 2
INITIATION OF GAIT IMMEDIATELY AFTER GO: 1 - NO HESITANCY

## 2025-09-04 ASSESSMENT — ACTIVITIES OF DAILY LIVING (ADL)
HOME_HEALTH_OASIS: 00
OASIS_M1830: 01

## 2025-09-04 ASSESSMENT — BALANCE ASSESSMENTS
TURNING 360 DEGREES STEPS: 0 - DISCONTINUOUS STEPS
NUDGED: 2 - STEADY
ARISES: 1 - ABLE, USES ARMS TO HELP
ATTEMPTS TO ARISE: 2 - ABLE TO RISE, ONE ATTEMPT
STANDING BALANCE: 2 - NARROW STANCE WITHOUT SUPPORT
NUDGED SCORE: 2
IMMEDIATE STANDING BALANCE FIRST 5 SECONDS: 2 - STEADY WITHOUT WALKER OR OTHER SUPPORT
BALANCE SCORE: 13
SITTING DOWN: 1 - USES ARMS OR NOT SMOOTH MOTION
ARISING SCORE: 1
SITTING BALANCE: 1 - STEADY, SAFE
EYES CLOSED AT MAXIMUM POSITION NUDGED: 1 - STEADY

## 2025-09-25 ENCOUNTER — APPOINTMENT (OUTPATIENT)
Dept: ENDOCRINOLOGY | Facility: CLINIC | Age: 75
End: 2025-09-25
Payer: MEDICARE

## 2025-10-02 ENCOUNTER — APPOINTMENT (OUTPATIENT)
Dept: ORTHOPEDIC SURGERY | Facility: CLINIC | Age: 75
End: 2025-10-02
Payer: MEDICARE

## 2025-10-06 ENCOUNTER — APPOINTMENT (OUTPATIENT)
Dept: ENDOCRINOLOGY | Facility: CLINIC | Age: 75
End: 2025-10-06
Payer: MEDICARE

## 2025-11-20 ENCOUNTER — APPOINTMENT (OUTPATIENT)
Dept: PRIMARY CARE | Facility: CLINIC | Age: 75
End: 2025-11-20
Payer: MEDICARE

## (undated) DEVICE — SUTURE, MONOCRYL, 3-0, 27 IN, PS-2, UNDYED

## (undated) DEVICE — SUTURE, CTD, VICRYL, 2-0, UND, BR, CT-2

## (undated) DEVICE — TOWEL, SURGICAL, NEURO, O/R, 16 X 26, BLUE, STERILE

## (undated) DEVICE — DRESSING, MEPILEX BORDER, POST-OP AG, 4 X 8 IN

## (undated) DEVICE — INTERPULSE HANDPIECE SET W/ 10FT SUCTION TUBING

## (undated) DEVICE — BLADE, SAW PFC DUAL CUT 25 X 90

## (undated) DEVICE — GLOVE, SURGICAL, PROTEXIS PI MICRO, 8.0, PF, LF

## (undated) DEVICE — GLOVE, SURGICAL, PROTEXIS PI ORTHO, 8.0, PF, LF

## (undated) DEVICE — ADHESIVE, DERMABOND, PRINEO, 12 X 9, STERILE

## (undated) DEVICE — RETRIEVER, SUTURE, HEWSON

## (undated) DEVICE — DRESSING, MEPILEX BORDER, POST-OP AG, 4 X 12 IN

## (undated) DEVICE — SUTURE, VICRYL, 0, 36 IN, CT-1, UNDYED

## (undated) DEVICE — Device

## (undated) DEVICE — SYRINGE, 60 CC, IRRIGATION, BULB, CONTRO-BULB, PAPER POUCH

## (undated) DEVICE — DRAPE, ISOLATION, ANTIMICROBIAL, W/POUCH, IOBAN 2, STERI DRAPE, 125 X 83 IN, DISPOSABLE, STERILE

## (undated) DEVICE — SUTURE, VICRYL, 1, 27 IN, CT-1, VIOLET

## (undated) DEVICE — DRAPE, INCISE, ANTIMICROBIAL, IOBAN 2, STERI DRAPE, 23 X 33 IN, DISPOSABLE, STERILE